# Patient Record
Sex: MALE | Race: BLACK OR AFRICAN AMERICAN | Employment: OTHER | URBAN - METROPOLITAN AREA
[De-identification: names, ages, dates, MRNs, and addresses within clinical notes are randomized per-mention and may not be internally consistent; named-entity substitution may affect disease eponyms.]

---

## 2017-01-05 ENCOUNTER — TELEPHONE (OUTPATIENT)
Dept: INTERNAL MEDICINE CLINIC | Facility: CLINIC | Age: 52
End: 2017-01-05

## 2017-01-05 NOTE — TELEPHONE ENCOUNTER
Deborah/[place] billing office has a Physician Diabetic order  for this patient  States the ICD10 Code on the order is not valid and also line 6 is missing     Please refax with  [phone]

## 2017-01-06 NOTE — TELEPHONE ENCOUNTER
Jennie from [employer] states that the ICD10 code is still incorrect. It should be E11.9 instead of the E11.1   Also on line 7 she needs a secondary diagnosis code.  She is looking for something that is not related to diabetes/blood sugars for example hypert

## 2017-01-12 ENCOUNTER — OFFICE VISIT (OUTPATIENT)
Dept: OPTOMETRY | Facility: CLINIC | Age: 52
End: 2017-01-12

## 2017-01-12 DIAGNOSIS — H52.4 PRESBYOPIA: ICD-10-CM

## 2017-01-12 DIAGNOSIS — E11.9 TYPE 2 DIABETES MELLITUS WITHOUT COMPLICATION, WITHOUT LONG-TERM CURRENT USE OF INSULIN (HCC): Primary | ICD-10-CM

## 2017-01-12 DIAGNOSIS — H25.13 AGE-RELATED NUCLEAR CATARACT OF BOTH EYES: ICD-10-CM

## 2017-01-12 PROCEDURE — 92004 COMPRE OPH EXAM NEW PT 1/>: CPT | Performed by: OPTOMETRIST

## 2017-01-12 NOTE — PROGRESS NOTES
Sam Bi is a 45year old male. HPI:     HPI     Diabetic Eye Exam   Diabetes characteristics include Type 2, taking oral medications and controlled with diet. Duration of 3 months.            Comments   Patient is in for an annual eye exam. Kandy every 4 (four) hours as needed for Wheezing. Disp: 1 Inhaler Rfl: 6   Budesonide-Formoterol Fumarate (SYMBICORT) 160-4.5 MCG/ACT Inhalation Aerosol Inhale 2 puffs into the lungs 2 (two) times daily.  Disp: 1 Inhaler Rfl: 0       Allergies:  No Known Allergi Sphere     Left +1.75 -1.50 180          Manifest Refraction #2      Sphere Cylinder Axis Dist   Right +1.00 Sphere  20/20   Left +1.25 Sphere  20/20         Manifest Refraction Comments     Recommend +1.00  for far and continue to use +3.00 for near.     Erickson Noel

## 2017-01-12 NOTE — PATIENT INSTRUCTIONS
Type 2 diabetes mellitus without complication, without long-term current use of insulin ([address]  I advised patient that there is no background diabetic retinopathy in either eye and that they should continue to keep their blood sugar under control and contin

## 2017-02-01 ENCOUNTER — TELEPHONE (OUTPATIENT)
Dept: INTERNAL MEDICINE CLINIC | Facility: CLINIC | Age: 52
End: 2017-02-01

## 2017-02-01 DIAGNOSIS — M25.561 PAIN IN BOTH KNEES, UNSPECIFIED CHRONICITY: Primary | ICD-10-CM

## 2017-02-01 DIAGNOSIS — M25.562 PAIN IN BOTH KNEES, UNSPECIFIED CHRONICITY: Primary | ICD-10-CM

## 2017-02-01 NOTE — TELEPHONE ENCOUNTER
Please look into his email request to me regarding a knee injection. Really should not come to me but should be sent to [facility] or someone else to be pended back.

## 2017-02-03 NOTE — TELEPHONE ENCOUNTER
Managed care please advise regarding pt request below.                      Expand All Collapse All    From: Abel Miles  To: Twin Chapman MD  Sent: 1/27/2017  4:29 PM CST  Subject: Non-Urgent Medical Question    I would like to have another vi

## 2017-02-03 NOTE — TELEPHONE ENCOUNTER
Please generate a referral to ortho and ortho will generate a referral for knee injections.      Thank you, Zen

## 2017-02-22 ENCOUNTER — ANESTHESIA EVENT (OUTPATIENT)
Dept: ENDOSCOPY | Facility: HOSPITAL | Age: 52
End: 2017-02-22
Payer: MEDICARE

## 2017-02-22 ENCOUNTER — ANESTHESIA (OUTPATIENT)
Dept: ENDOSCOPY | Facility: HOSPITAL | Age: 52
End: 2017-02-22
Payer: MEDICARE

## 2017-02-22 ENCOUNTER — SURGERY (OUTPATIENT)
Age: 52
End: 2017-02-22

## 2017-02-22 ENCOUNTER — HOSPITAL ENCOUNTER (OUTPATIENT)
Facility: HOSPITAL | Age: 52
Setting detail: OBSERVATION
Discharge: HOME | End: 2017-02-22
Attending: EMERGENCY MEDICINE | Admitting: HOSPITALIST
Payer: MEDICARE

## 2017-02-22 ENCOUNTER — APPOINTMENT (OUTPATIENT)
Dept: CT IMAGING | Facility: HOSPITAL | Age: 52
End: 2017-02-22
Attending: EMERGENCY MEDICINE
Payer: MEDICARE

## 2017-02-22 DIAGNOSIS — N23 URETERAL COLIC: Primary | ICD-10-CM

## 2017-02-22 DIAGNOSIS — K44.9 HIATAL HERNIA: ICD-10-CM

## 2017-02-22 DIAGNOSIS — K29.70 GASTRITIS: ICD-10-CM

## 2017-02-22 DIAGNOSIS — K20.90 ESOPHAGITIS: ICD-10-CM

## 2017-02-22 DIAGNOSIS — K31.89 MASS OF STOMACH: ICD-10-CM

## 2017-02-22 PROBLEM — R73.9 HYPERGLYCEMIA: Status: ACTIVE | Noted: 2017-02-22

## 2017-02-22 LAB
ANION GAP SERPL CALC-SCNC: 12 MMOL/L (ref 0–18)
BACTERIA UR QL AUTO: NEGATIVE /HPF
BASOPHILS # BLD: 0 K/UL (ref 0–0.2)
BASOPHILS NFR BLD: 1 %
BILIRUB UR QL: NEGATIVE
BUN SERPL-MCNC: 20 MG/DL (ref 8–20)
BUN/CREAT SERPL: 14.3 (ref 10–20)
CALCIUM SERPL-MCNC: 9.3 MG/DL (ref 8.5–10.5)
CHLORIDE SERPL-SCNC: 105 MMOL/L (ref 95–110)
CO2 SERPL-SCNC: 22 MMOL/L (ref 22–32)
COLOR UR: YELLOW
CREAT SERPL-MCNC: 1.4 MG/DL (ref 0.5–1.5)
EOSINOPHIL # BLD: 0 K/UL (ref 0–0.7)
EOSINOPHIL NFR BLD: 0 %
ERYTHROCYTE [DISTWIDTH] IN BLOOD BY AUTOMATED COUNT: 15 % (ref 11–15)
GLUCOSE SERPL-MCNC: 172 MG/DL (ref 70–99)
GLUCOSE UR-MCNC: NEGATIVE MG/DL
HCT VFR BLD AUTO: 39.7 % (ref 41–52)
HGB BLD-MCNC: 13.3 G/DL (ref 13.5–17.5)
KETONES UR-MCNC: NEGATIVE MG/DL
LEUKOCYTE ESTERASE UR QL STRIP.AUTO: NEGATIVE
LYMPHOCYTES # BLD: 0.9 K/UL (ref 1–4)
LYMPHOCYTES NFR BLD: 10 %
MCH RBC QN AUTO: 27.9 PG (ref 27–32)
MCHC RBC AUTO-ENTMCNC: 33.5 G/DL (ref 32–37)
MCV RBC AUTO: 83.2 FL (ref 80–100)
MONOCYTES # BLD: 0.5 K/UL (ref 0–1)
MONOCYTES NFR BLD: 6 %
NEUTROPHILS # BLD AUTO: 7.8 K/UL (ref 1.8–7.7)
NEUTROPHILS NFR BLD: 84 %
NITRITE UR QL STRIP.AUTO: NEGATIVE
OSMOLALITY UR CALC.SUM OF ELEC: 295 MOSM/KG (ref 275–295)
PH UR: 5 [PH] (ref 5–8)
PLATELET # BLD AUTO: 186 K/UL (ref 140–400)
PMV BLD AUTO: 9.5 FL (ref 7.4–10.3)
POTASSIUM SERPL-SCNC: 3.9 MMOL/L (ref 3.3–5.1)
PROT UR-MCNC: 30 MG/DL
RBC # BLD AUTO: 4.77 M/UL (ref 4.5–5.9)
RBC #/AREA URNS AUTO: 141 /HPF
SODIUM SERPL-SCNC: 139 MMOL/L (ref 136–144)
SP GR UR STRIP: 1.02 (ref 1–1.03)
UROBILINOGEN UR STRIP-ACNC: <2
VIT C UR-MCNC: NEGATIVE MG/DL
WBC # BLD AUTO: 9.4 K/UL (ref 4–11)
WBC #/AREA URNS AUTO: 2 /HPF

## 2017-02-22 PROCEDURE — 0DB68ZX EXCISION OF STOMACH, VIA NATURAL OR ARTIFICIAL OPENING ENDOSCOPIC, DIAGNOSTIC: ICD-10-PCS | Performed by: INTERNAL MEDICINE

## 2017-02-22 PROCEDURE — 99236 HOSP IP/OBS SAME DATE HI 85: CPT | Performed by: HOSPITALIST

## 2017-02-22 PROCEDURE — 0DB58ZX EXCISION OF ESOPHAGUS, VIA NATURAL OR ARTIFICIAL OPENING ENDOSCOPIC, DIAGNOSTIC: ICD-10-PCS | Performed by: INTERNAL MEDICINE

## 2017-02-22 PROCEDURE — 0D948ZX DRAINAGE OF ESOPHAGOGASTRIC JUNCTION, VIA NATURAL OR ARTIFICIAL OPENING ENDOSCOPIC, DIAGNOSTIC: ICD-10-PCS | Performed by: INTERNAL MEDICINE

## 2017-02-22 PROCEDURE — 99223 1ST HOSP IP/OBS HIGH 75: CPT | Performed by: INTERNAL MEDICINE

## 2017-02-22 PROCEDURE — BD47ZZZ ULTRASONOGRAPHY OF GASTROINTESTINAL TRACT: ICD-10-PCS | Performed by: INTERNAL MEDICINE

## 2017-02-22 PROCEDURE — 74176 CT ABD & PELVIS W/O CONTRAST: CPT

## 2017-02-22 RX ORDER — MORPHINE SULFATE 4 MG/ML
2 INJECTION, SOLUTION INTRAMUSCULAR; INTRAVENOUS EVERY 2 HOUR PRN
Status: DISCONTINUED | OUTPATIENT
Start: 2017-02-22 | End: 2017-02-22

## 2017-02-22 RX ORDER — HYDROMORPHONE HYDROCHLORIDE 1 MG/ML
0.5 INJECTION, SOLUTION INTRAMUSCULAR; INTRAVENOUS; SUBCUTANEOUS ONCE
Status: COMPLETED | OUTPATIENT
Start: 2017-02-22 | End: 2017-02-22

## 2017-02-22 RX ORDER — TAMSULOSIN HYDROCHLORIDE 0.4 MG/1
0.4 CAPSULE ORAL DAILY
Qty: 30 CAPSULE | Refills: 0 | Status: SHIPPED | OUTPATIENT
Start: 2017-02-22 | End: 2017-03-24

## 2017-02-22 RX ORDER — HEPARIN SODIUM 5000 [USP'U]/ML
5000 INJECTION, SOLUTION INTRAVENOUS; SUBCUTANEOUS EVERY 8 HOURS
Status: DISCONTINUED | OUTPATIENT
Start: 2017-02-22 | End: 2017-02-22

## 2017-02-22 RX ORDER — HYDROCODONE BITARTRATE AND ACETAMINOPHEN 10; 325 MG/1; MG/1
1 TABLET ORAL EVERY 6 HOURS PRN
Qty: 30 TABLET | Refills: 0 | Status: ON HOLD | OUTPATIENT
Start: 2017-02-22 | End: 2017-04-04

## 2017-02-22 RX ORDER — NALOXONE HYDROCHLORIDE 0.4 MG/ML
80 INJECTION, SOLUTION INTRAMUSCULAR; INTRAVENOUS; SUBCUTANEOUS AS NEEDED
Status: CANCELLED | OUTPATIENT
Start: 2017-02-22 | End: 2017-02-22

## 2017-02-22 RX ORDER — KETOROLAC TROMETHAMINE 30 MG/ML
30 INJECTION, SOLUTION INTRAMUSCULAR; INTRAVENOUS ONCE
Status: COMPLETED | OUTPATIENT
Start: 2017-02-22 | End: 2017-02-22

## 2017-02-22 RX ORDER — MIDAZOLAM HYDROCHLORIDE 1 MG/ML
INJECTION INTRAMUSCULAR; INTRAVENOUS AS NEEDED
Status: DISCONTINUED | OUTPATIENT
Start: 2017-02-22 | End: 2017-02-22 | Stop reason: SURG

## 2017-02-22 RX ORDER — ONDANSETRON 2 MG/ML
4 INJECTION INTRAMUSCULAR; INTRAVENOUS ONCE
Status: COMPLETED | OUTPATIENT
Start: 2017-02-22 | End: 2017-02-22

## 2017-02-22 RX ORDER — SODIUM CHLORIDE, SODIUM LACTATE, POTASSIUM CHLORIDE, CALCIUM CHLORIDE 600; 310; 30; 20 MG/100ML; MG/100ML; MG/100ML; MG/100ML
INJECTION, SOLUTION INTRAVENOUS CONTINUOUS PRN
Status: DISCONTINUED | OUTPATIENT
Start: 2017-02-22 | End: 2017-02-22 | Stop reason: SURG

## 2017-02-22 RX ORDER — ACETAMINOPHEN 325 MG/1
650 TABLET ORAL EVERY 4 HOURS PRN
Status: CANCELLED | OUTPATIENT
Start: 2017-02-22

## 2017-02-22 RX ORDER — HYDROCODONE BITARTRATE AND ACETAMINOPHEN 5; 325 MG/1; MG/1
1 TABLET ORAL EVERY 4 HOURS PRN
Status: CANCELLED | OUTPATIENT
Start: 2017-02-22

## 2017-02-22 RX ORDER — HYDROCODONE BITARTRATE AND ACETAMINOPHEN 5; 325 MG/1; MG/1
2 TABLET ORAL EVERY 4 HOURS PRN
Status: CANCELLED | OUTPATIENT
Start: 2017-02-22

## 2017-02-22 RX ORDER — DOCUSATE SODIUM 100 MG/1
100 CAPSULE, LIQUID FILLED ORAL 2 TIMES DAILY PRN
Qty: 40 CAPSULE | Refills: 0 | Status: SHIPPED | OUTPATIENT
Start: 2017-02-22 | End: 2018-12-11

## 2017-02-22 RX ORDER — ZOLPIDEM TARTRATE 5 MG/1
5 TABLET ORAL NIGHTLY PRN
Status: DISCONTINUED | OUTPATIENT
Start: 2017-02-22 | End: 2017-02-22

## 2017-02-22 RX ORDER — SODIUM CHLORIDE 9 MG/ML
INJECTION, SOLUTION INTRAVENOUS CONTINUOUS
Status: DISPENSED | OUTPATIENT
Start: 2017-02-22 | End: 2017-02-22

## 2017-02-22 RX ORDER — SODIUM CHLORIDE, SODIUM LACTATE, POTASSIUM CHLORIDE, CALCIUM CHLORIDE 600; 310; 30; 20 MG/100ML; MG/100ML; MG/100ML; MG/100ML
INJECTION, SOLUTION INTRAVENOUS CONTINUOUS
Status: CANCELLED | OUTPATIENT
Start: 2017-02-22

## 2017-02-22 RX ORDER — SIMETHICONE 125 MG
125 TABLET,CHEWABLE ORAL EVERY 6 HOURS PRN
Qty: 80 TABLET | Refills: 0 | Status: SHIPPED | OUTPATIENT
Start: 2017-02-22 | End: 2021-12-13

## 2017-02-22 RX ORDER — LIDOCAINE HYDROCHLORIDE 10 MG/ML
INJECTION, SOLUTION EPIDURAL; INFILTRATION; INTRACAUDAL; PERINEURAL AS NEEDED
Status: DISCONTINUED | OUTPATIENT
Start: 2017-02-22 | End: 2017-02-22 | Stop reason: SURG

## 2017-02-22 RX ORDER — LISINOPRIL AND HYDROCHLOROTHIAZIDE 25; 20 MG/1; MG/1
1 TABLET ORAL NIGHTLY
Status: DISCONTINUED | OUTPATIENT
Start: 2017-02-22 | End: 2017-02-22

## 2017-02-22 RX ORDER — MORPHINE SULFATE 4 MG/ML
1 INJECTION, SOLUTION INTRAMUSCULAR; INTRAVENOUS EVERY 2 HOUR PRN
Status: DISCONTINUED | OUTPATIENT
Start: 2017-02-22 | End: 2017-02-22

## 2017-02-22 RX ORDER — AMLODIPINE BESYLATE 5 MG/1
5 TABLET ORAL NIGHTLY
Status: DISCONTINUED | OUTPATIENT
Start: 2017-02-22 | End: 2017-02-22

## 2017-02-22 RX ORDER — NAPROXEN SODIUM 220 MG
2 TABLET ORAL
Status: ON HOLD | COMMUNITY
End: 2017-02-22

## 2017-02-22 RX ORDER — ONDANSETRON 2 MG/ML
4 INJECTION INTRAMUSCULAR; INTRAVENOUS EVERY 6 HOURS PRN
Status: DISCONTINUED | OUTPATIENT
Start: 2017-02-22 | End: 2017-02-22

## 2017-02-22 RX ORDER — ACETAMINOPHEN 325 MG/1
650 TABLET ORAL EVERY 6 HOURS PRN
Status: DISCONTINUED | OUTPATIENT
Start: 2017-02-22 | End: 2017-02-22

## 2017-02-22 RX ORDER — PANTOPRAZOLE SODIUM 40 MG/1
40 TABLET, DELAYED RELEASE ORAL
Qty: 30 TABLET | Refills: 0 | Status: SHIPPED | OUTPATIENT
Start: 2017-02-22 | End: 2017-03-28

## 2017-02-22 RX ORDER — MORPHINE SULFATE 4 MG/ML
4 INJECTION, SOLUTION INTRAMUSCULAR; INTRAVENOUS EVERY 2 HOUR PRN
Status: DISCONTINUED | OUTPATIENT
Start: 2017-02-22 | End: 2017-02-22

## 2017-02-22 RX ADMIN — SODIUM CHLORIDE, SODIUM LACTATE, POTASSIUM CHLORIDE, CALCIUM CHLORIDE: 600; 310; 30; 20 INJECTION, SOLUTION INTRAVENOUS at 12:25:00

## 2017-02-22 RX ADMIN — MIDAZOLAM HYDROCHLORIDE 2 MG: 1 INJECTION INTRAMUSCULAR; INTRAVENOUS at 11:57:00

## 2017-02-22 RX ADMIN — LIDOCAINE HYDROCHLORIDE 50 MG: 10 INJECTION, SOLUTION EPIDURAL; INFILTRATION; INTRACAUDAL; PERINEURAL at 11:58:00

## 2017-02-22 RX ADMIN — SODIUM CHLORIDE, SODIUM LACTATE, POTASSIUM CHLORIDE, CALCIUM CHLORIDE: 600; 310; 30; 20 INJECTION, SOLUTION INTRAVENOUS at 11:55:00

## 2017-02-22 NOTE — H&P
[facility]    PATIENT'S NAME: Twin Carter   ATTENDING PHYSICIAN: Edith Saleh MD   PATIENT ACCOUNT#:   [id number]    LOCATION:  [facility] ROOM 3 [place] [address]  MEDICAL RECORD #:   [id number]       YOB: 1965  ADMISSI kidney stone, diabetes, and hypertension. MEDICATIONS:    1. Metformin 500 mg daily with breakfast and 1000 mg at night  2. Amlodipine 5 mg daily  3. Lisinopril/hydrochlorothiazide 20/25 one daily  4. Mucinex 0.05% 1 daily.   5. Glucosamine liquid 30 mL coordination, able to lift both legs off the bed. Reflexes 1+ at the elbows and knees. Toes are downgoing.   SKIN:  No unusual rashes or tattoos seen on my exam.    LABORATORY DATA:  Sodium 139, potassium 3.9, chloride 105, CO2 of 22, BUN 22, creatinine 1 Regi Freeman MD  d: 02/22/2017 13:57:30  t: 02/22/2017 14:25:20  [facility] [id number]  [facility]/

## 2017-02-22 NOTE — ANESTHESIA PREPROCEDURE EVALUATION
Anesthesia PreOp Note    HPI:     Chriss Valdez is a 45year old male who presents for preoperative consultation requested by: Kin Harper MD    Date of Surgery: 2/22/2017    Procedure(s):  ESOPHAGOGASTRODUODENOSCOPY (EGD)  Indication: none given Disp:  Rfl:  Past Week at Unknown time   Naproxen Sodium (ALEVE) 220 MG Oral Tab Take 2 tablets by mouth daily as needed.  Disp:  Rfl:     Oxymetazoline HCl (MUCINEX NASAL SPRAY FULL FORCE) 0.05 % Nasal Solution 1 spray by Nasal route nightly as needed for 02/22/2017   RBC 4.77 02/22/2017   HGB 13.3* 02/22/2017   HCT 39.7* 02/22/2017   MCV 83.2 02/22/2017   MCH 27.9 02/22/2017   MCHC 33.5 02/22/2017   RDW 15.0 02/22/2017    02/22/2017   MPV 9.5 02/22/2017       Lab Results  Component Value Date   NA 1 CLAUDIA  2/22/2017 11:10 AM

## 2017-02-22 NOTE — CONSULTS
[facility]   Gastroenterology Consultation Note    Nona Ibarra  Patient Status:    Observation  Date of Admission:         2/22/2017, Hospital day #0  Attending:   Leslie Barakat MD  PCP:     Isabella Santos MD    Reason for facility-administered medications:   •  0.9%  NaCl infusion, , Intravenous, Continuous    Review of Systems:  CONSTITUTIONAL:  negative for fevers, rigors  EYES:  negative for diplopia   RESPIRATORY:  negative for severe shortness of breath  CARDIOVASCULAR hydroureteronephrosis, perinephric and proximal periureteric stranding. 2. A 7 cm left upper quadrant exophytic soft tissue mass arising from greater curvature of gastric fundus.  Differential considerations include exophytic leiomyoma, gastrointestinal str circumstances. All questions were answered to the patient’s satisfaction.  The patient has agreed to sign an informed consent and elected to proceed with upper endoscopy/enteroscopy with possible intervention [i.e. polypectomy, ablation, stent placement, et

## 2017-02-22 NOTE — OPERATIVE REPORT
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 Biopsies were taken from that area. The squamocolumnar junction appeared to be slightly irregular with an area of approximately 0.5 mm of flat columnar epithelium suggestive of Preciado esophagus. Biopsies were taken from that area.   He had an especially if it turns out to be a GIST. These results were discussed with Dr. Chao Da Silva.     Dictated By Pratik Elias MD  d: 02/22/2017 12:40:01  t: 02/22/2017 12:54:10  [facility] [id number]  RA/

## 2017-02-22 NOTE — BRIEF OP NOTE
[facility] ENDOSCOPY LAB SUITES  Brief Op Note     Azael Cisneros Location: OR   CSN [id number] MRN [id number]   Admission Date 2/22/2017 Operation Date 2/22/2017   Attending Physician Genesis Sen,* Operating Physician Jade Roa MD

## 2017-02-22 NOTE — ED PROVIDER NOTES
Patient Seen in: [facility] AND [facility] Emergency Department    History   Patient presents with:  Abdomen/Flank Pain (GI/)    Stated Complaint: flank pain    HPI    Patient presents to the emergency department complaining of sharp stabbing severe right flan MG Oral Tab,  Take 1 tablet by mouth daily. Patient taking differently: Take 1 tablet by mouth nightly. AmLODIPine Besylate 5 MG Oral Tab,  Take 1 tablet (5 mg total) by mouth daily. Patient taking differently: Take 5 mg by mouth nightly.      MetFORM distress. Abdominal: Soft. He exhibits no distension. There is no tenderness. Musculoskeletal: Normal range of motion. He exhibits no tenderness. Neurological: He is alert and oriented to person, place, and time. Skin: Skin is warm and dry.  No rash encounter diagnosis)  Mass of stomach  Hiatal hernia  Gastritis  Esophagitis    Disposition:  Admit    Follow-up:  Michel Washington, [facility]  Wes Houston MD  [address] [place] [address]

## 2017-02-22 NOTE — ANESTHESIA POSTPROCEDURE EVALUATION
Patient: Cole Beckford    Procedure Summary     Date Anesthesia Start Anesthesia Stop Room / Location    02/22/17 1155 1231 [facility] ENDOSCOPY 05 / 300 [facility] ENDOSCOPY       Procedure Diagnosis Surgeon Responsible Provider    ESOPHAGOGASTRODUODENOSCOPY (EGD) (N/A )

## 2017-02-23 ENCOUNTER — TELEPHONE (OUTPATIENT)
Dept: GASTROENTEROLOGY | Facility: CLINIC | Age: 52
End: 2017-02-23

## 2017-02-23 ENCOUNTER — TELEPHONE (OUTPATIENT)
Dept: INTERNAL MEDICINE CLINIC | Facility: CLINIC | Age: 52
End: 2017-02-23

## 2017-02-23 VITALS
TEMPERATURE: 98 F | DIASTOLIC BLOOD PRESSURE: 78 MMHG | BODY MASS INDEX: 44.1 KG/M2 | RESPIRATION RATE: 18 BRPM | HEIGHT: 71 IN | SYSTOLIC BLOOD PRESSURE: 108 MMHG | OXYGEN SATURATION: 95 % | HEART RATE: 96 BPM | WEIGHT: 315 LBS

## 2017-02-23 DIAGNOSIS — C16.9 MALIGNANT NEOPLASM OF STOMACH, UNSPECIFIED LOCATION (HCC): Primary | ICD-10-CM

## 2017-02-23 NOTE — TELEPHONE ENCOUNTER
Discussed the diagnosis of GIST from path results with [facility] today. He understands it is a type of cancer, and although it has favorable features, he will need to likely have surgery and therapy. I discussed the case with Dr. Rigo Sawant and Dr. Seth Lemos Meth

## 2017-02-23 NOTE — DISCHARGE SUMMARY
[facility]    PATIENT'S NAME: Smiley Henson   ATTENDING PHYSICIAN: Edith Saleh MD   PATIENT ACCOUNT#:   [id number]    LOCATION:  [facility] ROOM 3 [place] [address]  MEDICAL RECORD #:   [id number]       YOB: 1965  ADMISSI

## 2017-02-23 NOTE — TELEPHONE ENCOUNTER
Will have Ciaran Fitzgerald call patient to assist in setting up appts for oncologic f/u.     CC: Alden Silveira

## 2017-02-23 NOTE — PROGRESS NOTES
Quick Note:    Per RA discussed case with Dr Janine Sanz who will contact patient to discuss results and referral to surgeon.   ______

## 2017-02-24 ENCOUNTER — TELEPHONE (OUTPATIENT)
Dept: INTERNAL MEDICINE CLINIC | Facility: CLINIC | Age: 52
End: 2017-02-24

## 2017-02-24 ENCOUNTER — TELEPHONE (OUTPATIENT)
Dept: GASTROENTEROLOGY | Facility: CLINIC | Age: 52
End: 2017-02-24

## 2017-02-24 ENCOUNTER — NURSE NAVIGATOR ENCOUNTER (OUTPATIENT)
Dept: HEMATOLOGY/ONCOLOGY | Facility: HOSPITAL | Age: 52
End: 2017-02-24

## 2017-02-24 DIAGNOSIS — C49.A2 GASTROINTESTINAL STROMAL TUMOR (GIST) OF STOMACH (HCC): Primary | ICD-10-CM

## 2017-02-24 RX ORDER — AMOXICILLIN 500 MG/1
1000 TABLET, FILM COATED ORAL 2 TIMES DAILY
Qty: 56 TABLET | Refills: 0 | Status: SHIPPED | OUTPATIENT
Start: 2017-02-24 | End: 2017-03-10

## 2017-02-24 RX ORDER — CLARITHROMYCIN 500 MG/1
500 TABLET, COATED ORAL 2 TIMES DAILY
Qty: 28 TABLET | Refills: 0 | Status: SHIPPED | OUTPATIENT
Start: 2017-02-24 | End: 2017-03-10

## 2017-02-24 RX ORDER — FLUCONAZOLE 200 MG/1
200 TABLET ORAL DAILY
Qty: 14 TABLET | Refills: 0 | Status: SHIPPED | OUTPATIENT
Start: 2017-02-24 | End: 2017-03-10

## 2017-02-24 NOTE — TELEPHONE ENCOUNTER
Spoke with Scarlett at the [facility] patient is being seen on 2/28/17. Please approve order.

## 2017-02-24 NOTE — PROGRESS NOTES
Quick Note:    Discussed with Dr Jennie Benitez, patient to follow up with him for treatment  ______

## 2017-02-24 NOTE — PROGRESS NOTES
Call placed to [facility] to introduce myself as Nurse Navigator and explained my role in his care. Role of NN explained as providing:  Guidance to healthcare services to ensure continuity and coordination of care.   Education about your diagnosis, treatm

## 2017-02-24 NOTE — TELEPHONE ENCOUNTER
I discussed the other pathology results from EGD/EUS showing H.pylori gastritis and candida esophagitis (with sloughing of esophagus). He is asymptomatic from both infections.      The natural history of H.pylori was reviewed with the patient, as well as po

## 2017-02-27 ENCOUNTER — HOSPITAL ENCOUNTER (OUTPATIENT)
Dept: GENERAL RADIOLOGY | Facility: HOSPITAL | Age: 52
Discharge: HOME | End: 2017-02-27
Attending: ORTHOPAEDIC SURGERY | Admitting: ORTHOPAEDIC SURGERY
Payer: MEDICARE

## 2017-02-27 ENCOUNTER — OFFICE VISIT (OUTPATIENT)
Dept: ORTHOPEDICS CLINIC | Facility: CLINIC | Age: 52
End: 2017-02-27

## 2017-02-27 DIAGNOSIS — M17.0 PRIMARY OSTEOARTHRITIS OF BOTH KNEES: Primary | ICD-10-CM

## 2017-02-27 DIAGNOSIS — M25.562 PAIN IN BOTH KNEES, UNSPECIFIED CHRONICITY: ICD-10-CM

## 2017-02-27 DIAGNOSIS — M25.561 PAIN IN BOTH KNEES, UNSPECIFIED CHRONICITY: ICD-10-CM

## 2017-02-27 PROCEDURE — 99203 OFFICE O/P NEW LOW 30 MIN: CPT | Performed by: ORTHOPAEDIC SURGERY

## 2017-02-27 PROCEDURE — G0463 HOSPITAL OUTPT CLINIC VISIT: HCPCS | Performed by: ORTHOPAEDIC SURGERY

## 2017-02-27 PROCEDURE — 73560 X-RAY EXAM OF KNEE 1 OR 2: CPT

## 2017-02-27 PROCEDURE — 73565 X-RAY EXAM OF KNEES: CPT

## 2017-02-27 NOTE — PROGRESS NOTES
Dr. Prudencio Sol was seen in my office as you requested today for orthopedic consultation regarding knee pain.   This is a 44-year-old gentleman who is had long-standing history of knee arthritis treated with cortisone injections as well as Juan Pablo Chapa to ambulate    Bilateral knee exam show obesity about the legs but he can flex at least 110-115 extensions full with some crepitance but ligaments on both knees are stable and no knee has any effusion warmth or erythema.   He is no calf tenderness sensorimo

## 2017-02-28 ENCOUNTER — OFFICE VISIT (OUTPATIENT)
Dept: SURGERY | Facility: CLINIC | Age: 52
End: 2017-02-28

## 2017-02-28 ENCOUNTER — OFFICE VISIT (OUTPATIENT)
Dept: HEMATOLOGY/ONCOLOGY | Facility: HOSPITAL | Age: 52
End: 2017-02-28
Attending: INTERNAL MEDICINE
Payer: MEDICARE

## 2017-02-28 VITALS
TEMPERATURE: 98 F | HEIGHT: 71 IN | SYSTOLIC BLOOD PRESSURE: 117 MMHG | WEIGHT: 315 LBS | DIASTOLIC BLOOD PRESSURE: 66 MMHG | BODY MASS INDEX: 44.1 KG/M2 | RESPIRATION RATE: 16 BRPM | HEART RATE: 90 BPM

## 2017-02-28 DIAGNOSIS — C49.A2 MALIGNANT GASTROINTESTINAL STROMAL TUMOR (GIST) OF STOMACH (HCC): Primary | ICD-10-CM

## 2017-02-28 DIAGNOSIS — C49.A2 GASTROINTESTINAL STROMAL TUMOR (GIST) OF STOMACH (HCC): Primary | ICD-10-CM

## 2017-02-28 DIAGNOSIS — E66.9 OBESITY, UNSPECIFIED OBESITY SEVERITY, UNSPECIFIED OBESITY TYPE: ICD-10-CM

## 2017-02-28 DIAGNOSIS — C49.A0 MALIGNANT GASTROINTESTINAL STROMAL TUMOR, UNSPECIFIED SITE (HCC): Primary | ICD-10-CM

## 2017-02-28 PROCEDURE — G0463 HOSPITAL OUTPT CLINIC VISIT: HCPCS | Performed by: INTERNAL MEDICINE

## 2017-02-28 PROCEDURE — 99205 OFFICE O/P NEW HI 60 MIN: CPT | Performed by: SURGERY

## 2017-02-28 PROCEDURE — 99205 OFFICE O/P NEW HI 60 MIN: CPT | Performed by: INTERNAL MEDICINE

## 2017-02-28 NOTE — CONSULTS
[address] [facility] Surgical Oncology    Patient Name:  Sisi Mancia   YOB: 1965   Gender: male   Appt Date:  02/28/2016   Provider:  Ana Harvey MD   [facility]     Chief Complaint:  Evaluation for Gastric mass    H • Diabetes [facility])          Past Surgical History    EGD N/A 2/22/2017    Comment Procedure: ESOPHAGOGASTRODUODENOSCOPY (EGD);   Surgeon: Nicola Mendiola MD;  Location: [address] [place] [address] ENDOSCOPY     Family History   Problem Relation Age of Onset   • Alcohol and Other [place] •  Pantoprazole Sodium (PROTONIX) 40 MG Oral Tab EC, Take 1 tablet (40 mg total) by mouth every morning before breakfast., Disp: 30 tablet, Rfl: 0  •  Lisinopril-Hydrochlorothiazide 20-25 MG Oral Tab, Take 1 tablet by mouth daily.  (Patient taking different Lab Results  Component Value Date   WBC 9.4 02/22/2017   RBC 4.77 02/22/2017   HGB 13.3* 02/22/2017   HCT 39.7* 02/22/2017   MCV 83.2 02/22/2017   MCH 27.9 02/22/2017   MCHC 33.5 02/22/2017   RDW 15.0 02/22/2017    02/22/2017   MPV 9.5 02/22/2017 T: [phone]  F: [phone]

## 2017-02-28 NOTE — PROGRESS NOTES
New consult: Jessa-gastric GIST tumor. Pt reports knee pain, 6/10. Pt given pre-op instuctions for surgery. Pt to see PCP for medical clearance.      Education Record    Learner:  Patient    Disease / Diagnosis: GIST tumor     Barriers / Limitations:  None

## 2017-03-01 ENCOUNTER — NURSE NAVIGATOR ENCOUNTER (OUTPATIENT)
Dept: HEMATOLOGY/ONCOLOGY | Facility: HOSPITAL | Age: 52
End: 2017-03-01

## 2017-03-01 NOTE — PROGRESS NOTES
Introduced myself as Nurse Navigator to Jacqueline Swartz and explained that I have scheduled him for a CT scan of his chest, abdomen and pelvis, as directed by his physician.   Jacqueline Swartz informed me that he can only make appointments on Saturday or Sunday or late in the lisa

## 2017-03-01 NOTE — CONSULTS
[facility]    PATIENT'S NAME: Avinash Reyna PHYSICIAN: Jenny Lawson.  Sharif Nur MD   PATIENT ACCOUNT #: [de-identified] LOCATION: [address] [place] [address] RECORD #: [id number] YOB: 1965   CONSULTATION DATE: 02/28/2017       CANCER ADRIANNA osteoarthritis, nephrolithiasis, H. pylori gastritis and candida esophagitis. MEDICATION:  Amlodipine, metformin, lisinopril/hydrochlorothiazide, Mucinex, glucosamine, Protonix, fluconazole. ALLERGIES:  No known drug allergies.     SOCIAL HISTORY: determination. Of note, we would recommend a dedicated contrasted CT of the chest, abdomen, and pelvis prior to surgery. This order was placed.     Thank you very much for the consultation request.  We will continue to follow with the patient in our cli

## 2017-03-04 ENCOUNTER — HOSPITAL ENCOUNTER (OUTPATIENT)
Dept: CT IMAGING | Age: 52
Discharge: HOME | End: 2017-03-04
Attending: INTERNAL MEDICINE
Payer: MEDICARE

## 2017-03-04 DIAGNOSIS — C49.A2 GASTROINTESTINAL STROMAL TUMOR (GIST) OF STOMACH (HCC): ICD-10-CM

## 2017-03-04 PROCEDURE — 74177 CT ABD & PELVIS W/CONTRAST: CPT

## 2017-03-04 PROCEDURE — 71260 CT THORAX DX C+: CPT

## 2017-03-07 ENCOUNTER — TELEPHONE (OUTPATIENT)
Dept: INTERNAL MEDICINE CLINIC | Facility: CLINIC | Age: 52
End: 2017-03-07

## 2017-03-07 NOTE — TELEPHONE ENCOUNTER
Pt is eligible for a Medicare Annual Health Assessment. Discussed in detail w/patient. Appt made for 6/13/2017.

## 2017-03-20 ENCOUNTER — PATIENT MESSAGE (OUTPATIENT)
Dept: ORTHOPEDICS CLINIC | Facility: CLINIC | Age: 52
End: 2017-03-20

## 2017-03-20 ENCOUNTER — OFFICE VISIT (OUTPATIENT)
Dept: ORTHOPEDICS CLINIC | Facility: CLINIC | Age: 52
End: 2017-03-20

## 2017-03-20 VITALS — RESPIRATION RATE: 16 BRPM | SYSTOLIC BLOOD PRESSURE: 120 MMHG | DIASTOLIC BLOOD PRESSURE: 72 MMHG | HEART RATE: 64 BPM

## 2017-03-20 DIAGNOSIS — M17.10 PRIMARY OSTEOARTHRITIS OF KNEE, UNSPECIFIED LATERALITY: ICD-10-CM

## 2017-03-20 DIAGNOSIS — M17.0 PRIMARY OSTEOARTHRITIS OF BOTH KNEES: Primary | ICD-10-CM

## 2017-03-20 PROCEDURE — 20610 DRAIN/INJ JOINT/BURSA W/O US: CPT | Performed by: ORTHOPAEDIC SURGERY

## 2017-03-20 RX ORDER — AMOXICILLIN 500 MG/1
CAPSULE ORAL
Refills: 0 | COMMUNITY
Start: 2017-02-24 | End: 2017-03-22

## 2017-03-20 NOTE — TELEPHONE ENCOUNTER
From: Rasta Ortiz  To: Giancarlo De Oliveira MD  Sent: 3/20/2017 2:29 PM CDT  Subject: Non-Urgent Medical Question    How many cc of synvisc was I given?

## 2017-03-20 NOTE — PROCEDURES
With consent given the left knee was prepped and draped and injected with Synvisc 1 per protocol. He tolerated quite well. Precautions were discussed if there is any problems with the injection.   Also the fact this could take anywhere from 4-6 weeks to h

## 2017-03-20 NOTE — TELEPHONE ENCOUNTER
From: Marquis Sosa  To: Noah Buck MD  Sent: 3/20/2017 2:08 PM CDT  Subject: Non-Urgent Medical Question    The pretty lady who prepared me for the synvisc asked about the medication that was not in her information.  It is Clarithromycin 500 MG 1x/d

## 2017-03-20 NOTE — PROGRESS NOTES
Patient is here today requesting Synvisc 1 to his left knee. He had this done some time ago and it did help. It is his left knee that is most symptomatic with known primary osteoarthritis of both knees.     He has not lost any weight and he understands th

## 2017-03-22 ENCOUNTER — OFFICE VISIT (OUTPATIENT)
Dept: INTERNAL MEDICINE CLINIC | Facility: CLINIC | Age: 52
End: 2017-03-22

## 2017-03-22 ENCOUNTER — LAB ENCOUNTER (OUTPATIENT)
Dept: LAB | Facility: HOSPITAL | Age: 52
End: 2017-03-22
Attending: SURGERY
Payer: MEDICARE

## 2017-03-22 ENCOUNTER — TELEPHONE (OUTPATIENT)
Dept: ORTHOPEDICS CLINIC | Facility: CLINIC | Age: 52
End: 2017-03-22

## 2017-03-22 VITALS
SYSTOLIC BLOOD PRESSURE: 146 MMHG | BODY MASS INDEX: 44.1 KG/M2 | DIASTOLIC BLOOD PRESSURE: 84 MMHG | HEIGHT: 71 IN | HEART RATE: 88 BPM | WEIGHT: 315 LBS | RESPIRATION RATE: 16 BRPM

## 2017-03-22 DIAGNOSIS — I10 ESSENTIAL HYPERTENSION WITH GOAL BLOOD PRESSURE LESS THAN 140/90: ICD-10-CM

## 2017-03-22 DIAGNOSIS — E11.9 TYPE 2 DIABETES MELLITUS WITHOUT COMPLICATION, WITHOUT LONG-TERM CURRENT USE OF INSULIN (HCC): ICD-10-CM

## 2017-03-22 DIAGNOSIS — E78.2 MIXED HYPERLIPIDEMIA: ICD-10-CM

## 2017-03-22 DIAGNOSIS — K31.89 MASS OF STOMACH: ICD-10-CM

## 2017-03-22 DIAGNOSIS — C49.A2 MALIGNANT GASTROINTESTINAL STROMAL TUMOR (GIST) OF STOMACH (HCC): Primary | ICD-10-CM

## 2017-03-22 DIAGNOSIS — E11.9 TYPE 2 DIABETES MELLITUS WITHOUT COMPLICATION, WITHOUT LONG-TERM CURRENT USE OF INSULIN (HCC): Primary | ICD-10-CM

## 2017-03-22 DIAGNOSIS — C49.A2 MALIGNANT GASTROINTESTINAL STROMAL TUMOR (GIST) OF STOMACH (HCC): ICD-10-CM

## 2017-03-22 DIAGNOSIS — E66.01 MORBID OBESITY DUE TO EXCESS CALORIES (HCC): ICD-10-CM

## 2017-03-22 LAB
ALBUMIN SERPL BCP-MCNC: 4 G/DL (ref 3.5–4.8)
ALBUMIN/GLOB SERPL: 1.3 {RATIO} (ref 1–2)
ALP SERPL-CCNC: 72 U/L (ref 32–100)
ALT SERPL-CCNC: 48 U/L (ref 17–63)
ANION GAP SERPL CALC-SCNC: 10 MMOL/L (ref 0–18)
AST SERPL-CCNC: 34 U/L (ref 15–41)
BASOPHILS # BLD: 0.1 K/UL (ref 0–0.2)
BASOPHILS NFR BLD: 1 %
BILIRUB SERPL-MCNC: 0.4 MG/DL (ref 0.3–1.2)
BILIRUB UR QL: NEGATIVE
BUN SERPL-MCNC: 14 MG/DL (ref 8–20)
BUN/CREAT SERPL: 13.7 (ref 10–20)
CALCIUM SERPL-MCNC: 9.5 MG/DL (ref 8.5–10.5)
CHLORIDE SERPL-SCNC: 106 MMOL/L (ref 95–110)
CLARITY UR: CLEAR
CO2 SERPL-SCNC: 25 MMOL/L (ref 22–32)
COLOR UR: YELLOW
CREAT SERPL-MCNC: 1.02 MG/DL (ref 0.5–1.5)
CREAT UR-MCNC: 152.3 MG/DL
EOSINOPHIL # BLD: 0.1 K/UL (ref 0–0.7)
EOSINOPHIL NFR BLD: 2 %
ERYTHROCYTE [DISTWIDTH] IN BLOOD BY AUTOMATED COUNT: 15.3 % (ref 11–15)
GLOBULIN PLAS-MCNC: 3.1 G/DL (ref 2.5–3.7)
GLUCOSE SERPL-MCNC: 128 MG/DL (ref 70–99)
GLUCOSE UR-MCNC: NEGATIVE MG/DL
HBA1C MFR BLD: 6.9 % (ref 4–6)
HCT VFR BLD AUTO: 41 % (ref 41–52)
HGB BLD-MCNC: 13.5 G/DL (ref 13.5–17.5)
HGB UR QL STRIP.AUTO: NEGATIVE
INR BLD: 1 (ref 0.9–1.2)
KETONES UR-MCNC: NEGATIVE MG/DL
LEUKOCYTE ESTERASE UR QL STRIP.AUTO: NEGATIVE
LYMPHOCYTES # BLD: 1.2 K/UL (ref 1–4)
LYMPHOCYTES NFR BLD: 21 %
MCH RBC QN AUTO: 27.1 PG (ref 27–32)
MCHC RBC AUTO-ENTMCNC: 32.9 G/DL (ref 32–37)
MCV RBC AUTO: 82.5 FL (ref 80–100)
MICROALBUMIN UR-MCNC: 2.2 MG/DL (ref 0–1.8)
MICROALBUMIN/CREAT UR: 14.4 MG/G{CREAT} (ref 0–20)
MONOCYTES # BLD: 0.5 K/UL (ref 0–1)
MONOCYTES NFR BLD: 9 %
NEUTROPHILS # BLD AUTO: 3.8 K/UL (ref 1.8–7.7)
NEUTROPHILS NFR BLD: 66 %
NITRITE UR QL STRIP.AUTO: NEGATIVE
OSMOLALITY UR CALC.SUM OF ELEC: 294 MOSM/KG (ref 275–295)
PH UR: 5 [PH] (ref 5–8)
PLATELET # BLD AUTO: 197 K/UL (ref 140–400)
PMV BLD AUTO: 9.1 FL (ref 7.4–10.3)
POTASSIUM SERPL-SCNC: 3.8 MMOL/L (ref 3.3–5.1)
PROT SERPL-MCNC: 7.1 G/DL (ref 5.9–8.4)
PROT UR-MCNC: NEGATIVE MG/DL
PROTHROMBIN TIME: 12.4 SECONDS (ref 11.8–14.5)
RBC # BLD AUTO: 4.97 M/UL (ref 4.5–5.9)
SODIUM SERPL-SCNC: 141 MMOL/L (ref 136–144)
SP GR UR STRIP: 1.02 (ref 1–1.03)
UROBILINOGEN UR STRIP-ACNC: <2
VIT C UR-MCNC: NEGATIVE MG/DL
WBC # BLD AUTO: 5.8 K/UL (ref 4–11)

## 2017-03-22 PROCEDURE — 99214 OFFICE O/P EST MOD 30 MIN: CPT | Performed by: INTERNAL MEDICINE

## 2017-03-22 PROCEDURE — 80053 COMPREHEN METABOLIC PANEL: CPT

## 2017-03-22 PROCEDURE — G0463 HOSPITAL OUTPT CLINIC VISIT: HCPCS | Performed by: INTERNAL MEDICINE

## 2017-03-22 PROCEDURE — 85025 COMPLETE CBC W/AUTO DIFF WBC: CPT

## 2017-03-22 PROCEDURE — 93005 ELECTROCARDIOGRAM TRACING: CPT

## 2017-03-22 PROCEDURE — 82043 UR ALBUMIN QUANTITATIVE: CPT

## 2017-03-22 PROCEDURE — 81003 URINALYSIS AUTO W/O SCOPE: CPT

## 2017-03-22 PROCEDURE — 85610 PROTHROMBIN TIME: CPT

## 2017-03-22 PROCEDURE — 93010 ELECTROCARDIOGRAM REPORT: CPT | Performed by: SURGERY

## 2017-03-22 PROCEDURE — 83036 HEMOGLOBIN GLYCOSYLATED A1C: CPT

## 2017-03-22 PROCEDURE — 36415 COLL VENOUS BLD VENIPUNCTURE: CPT

## 2017-03-22 PROCEDURE — 82570 ASSAY OF URINE CREATININE: CPT

## 2017-03-22 RX ORDER — ATORVASTATIN CALCIUM 10 MG/1
10 TABLET, FILM COATED ORAL NIGHTLY
Qty: 90 TABLET | Refills: 3 | Status: SHIPPED | OUTPATIENT
Start: 2017-03-22 | End: 2017-07-17

## 2017-03-22 RX ORDER — AMOXICILLIN 500 MG/1
500 TABLET, FILM COATED ORAL 2 TIMES DAILY
Status: ON HOLD | COMMUNITY
End: 2017-04-04

## 2017-03-22 RX ORDER — CLARITHROMYCIN 250 MG/1
250 TABLET, FILM COATED ORAL DAILY
Status: ON HOLD | COMMUNITY
End: 2017-04-04

## 2017-03-22 RX ORDER — CLARITHROMYCIN 500 MG/1
500 TABLET, COATED ORAL DAILY
Status: ON HOLD | COMMUNITY
End: 2017-04-04

## 2017-03-22 NOTE — TELEPHONE ENCOUNTER
See MyChart encounter from 03/20/17 under Damian's name  Medication list for pt updated as requested by pt.

## 2017-03-22 NOTE — PROGRESS NOTES
Abel Miles is a 47year old male. HPI:   1.  Type 2 diabetes mellitus without complication, without long-term current use of insulin (HCC)    The patient has been taking all prescribed diabetic medications at home and has been following a diabetic die health.       Wt Readings from Last 6 Encounters:  03/22/17 : 384 lb (174.181 kg)  02/28/17 : 387 lb (175.542 kg)  02/22/17 : 397 lb 4 oz (180.191 kg)  12/21/16 : 381 lb (172.82 kg)  12/12/16 : 391 lb (177.356 kg)  11/17/16 : 382 lb (173.274 kg)        Curr nightly.  ) Disp: 60 tablet Rfl: 5      Past Medical History   Diagnosis Date   • Unspecified essential hypertension    • Depression    • Anxiety state, unspecified    • Obesity, unspecified    • Unspecified sleep apnea 2/1/2012   • Sarcoidosis ([facility] 2/1/2 HgbA1C,lose wgt with carbohydrate controlled diet and exercise, refer to Ophthalmology, check feet daily.    - Hemoglobin A1C [E]; Future  - Microalb/Creat Ratio, Random Urine [E]; Future    2.  Essential hypertension with goal blood pressure less than 140/

## 2017-03-27 ENCOUNTER — OFFICE VISIT (OUTPATIENT)
Dept: ORTHOPEDICS CLINIC | Facility: CLINIC | Age: 52
End: 2017-03-27

## 2017-03-27 DIAGNOSIS — M17.0 PRIMARY OSTEOARTHRITIS OF BOTH KNEES: Primary | ICD-10-CM

## 2017-03-27 PROCEDURE — 99212 OFFICE O/P EST SF 10 MIN: CPT | Performed by: ORTHOPAEDIC SURGERY

## 2017-03-27 PROCEDURE — 20610 DRAIN/INJ JOINT/BURSA W/O US: CPT | Performed by: ORTHOPAEDIC SURGERY

## 2017-03-27 NOTE — PROCEDURES
Procedure note. With consent given and a timeout performed the right knee was sterilely prepped and draped and injected with Synvisc 1 per protocol. He tolerated well.   Realistic expectations were discussed and if there is any redness or swelling we shou

## 2017-03-27 NOTE — PROGRESS NOTES
Patient presents for follow-up of the left knee Synvisc 1 injection is helped he denies significant swelling although there was a little swelling last weekend but it has resolved and overall he feels it has helped.   He is here for follow-up regarding his l

## 2017-04-04 ENCOUNTER — ANESTHESIA EVENT (OUTPATIENT)
Dept: SURGERY | Facility: HOSPITAL | Age: 52
DRG: 988 | End: 2017-04-04
Payer: MEDICARE

## 2017-04-04 ENCOUNTER — SURGERY (OUTPATIENT)
Age: 52
End: 2017-04-04

## 2017-04-04 ENCOUNTER — HOSPITAL ENCOUNTER (INPATIENT)
Facility: HOSPITAL | Age: 52
LOS: 3 days | Discharge: HOME | DRG: 988 | End: 2017-04-07
Attending: SURGERY | Admitting: SURGERY
Payer: MEDICARE

## 2017-04-04 ENCOUNTER — ANESTHESIA (OUTPATIENT)
Dept: SURGERY | Facility: HOSPITAL | Age: 52
DRG: 988 | End: 2017-04-04
Payer: MEDICARE

## 2017-04-04 DIAGNOSIS — C49.A0 MALIGNANT GASTROINTESTINAL STROMAL TUMOR, UNSPECIFIED SITE (HCC): ICD-10-CM

## 2017-04-04 DIAGNOSIS — K42.9 UMBILICAL HERNIA WITHOUT OBSTRUCTION AND WITHOUT GANGRENE: Primary | ICD-10-CM

## 2017-04-04 PROCEDURE — 99222 1ST HOSP IP/OBS MODERATE 55: CPT | Performed by: INTERNAL MEDICINE

## 2017-04-04 PROCEDURE — 0WQF0ZZ REPAIR ABDOMINAL WALL, OPEN APPROACH: ICD-10-PCS | Performed by: SURGERY

## 2017-04-04 PROCEDURE — 0DJ08ZZ INSPECTION OF UPPER INTESTINAL TRACT, VIA NATURAL OR ARTIFICIAL OPENING ENDOSCOPIC: ICD-10-PCS | Performed by: INTERNAL MEDICINE

## 2017-04-04 PROCEDURE — 43235 EGD DIAGNOSTIC BRUSH WASH: CPT | Performed by: INTERNAL MEDICINE

## 2017-04-04 PROCEDURE — 0DB64ZZ EXCISION OF STOMACH, PERCUTANEOUS ENDOSCOPIC APPROACH: ICD-10-PCS | Performed by: SURGERY

## 2017-04-04 PROCEDURE — 99233 SBSQ HOSP IP/OBS HIGH 50: CPT | Performed by: HOSPITALIST

## 2017-04-04 PROCEDURE — 8E0W4CZ ROBOTIC ASSISTED PROCEDURE OF TRUNK REGION, PERCUTANEOUS ENDOSCOPIC APPROACH: ICD-10-PCS | Performed by: SURGERY

## 2017-04-04 RX ORDER — SODIUM CHLORIDE 9 MG/ML
INJECTION, SOLUTION INTRAVENOUS CONTINUOUS PRN
Status: DISCONTINUED | OUTPATIENT
Start: 2017-04-04 | End: 2017-04-04 | Stop reason: SURG

## 2017-04-04 RX ORDER — METRONIDAZOLE 500 MG/100ML
500 INJECTION, SOLUTION INTRAVENOUS EVERY 8 HOURS
Status: COMPLETED | OUTPATIENT
Start: 2017-04-04 | End: 2017-04-06

## 2017-04-04 RX ORDER — HYDRALAZINE HYDROCHLORIDE 20 MG/ML
10 INJECTION INTRAMUSCULAR; INTRAVENOUS EVERY 4 HOURS PRN
Status: DISCONTINUED | OUTPATIENT
Start: 2017-04-04 | End: 2017-04-07

## 2017-04-04 RX ORDER — MORPHINE SULFATE 10 MG/ML
6 INJECTION, SOLUTION INTRAMUSCULAR; INTRAVENOUS EVERY 10 MIN PRN
Status: DISCONTINUED | OUTPATIENT
Start: 2017-04-04 | End: 2017-04-04 | Stop reason: HOSPADM

## 2017-04-04 RX ORDER — HYDROMORPHONE HYDROCHLORIDE 1 MG/ML
0.6 INJECTION, SOLUTION INTRAMUSCULAR; INTRAVENOUS; SUBCUTANEOUS EVERY 5 MIN PRN
Status: DISCONTINUED | OUTPATIENT
Start: 2017-04-04 | End: 2017-04-04 | Stop reason: HOSPADM

## 2017-04-04 RX ORDER — HYDROMORPHONE HYDROCHLORIDE 1 MG/ML
0.5 INJECTION, SOLUTION INTRAMUSCULAR; INTRAVENOUS; SUBCUTANEOUS EVERY 2 HOUR PRN
Status: DISCONTINUED | OUTPATIENT
Start: 2017-04-04 | End: 2017-04-06

## 2017-04-04 RX ORDER — MORPHINE SULFATE 2 MG/ML
2 INJECTION, SOLUTION INTRAMUSCULAR; INTRAVENOUS EVERY 10 MIN PRN
Status: DISCONTINUED | OUTPATIENT
Start: 2017-04-04 | End: 2017-04-04 | Stop reason: HOSPADM

## 2017-04-04 RX ORDER — HYDROMORPHONE HYDROCHLORIDE 1 MG/ML
0.2 INJECTION, SOLUTION INTRAMUSCULAR; INTRAVENOUS; SUBCUTANEOUS EVERY 5 MIN PRN
Status: DISCONTINUED | OUTPATIENT
Start: 2017-04-04 | End: 2017-04-04 | Stop reason: HOSPADM

## 2017-04-04 RX ORDER — ALVIMOPAN 12 MG/1
12 CAPSULE ORAL ONCE
Status: COMPLETED | OUTPATIENT
Start: 2017-04-04 | End: 2017-04-04

## 2017-04-04 RX ORDER — LIDOCAINE HYDROCHLORIDE 10 MG/ML
INJECTION, SOLUTION EPIDURAL; INFILTRATION; INTRACAUDAL; PERINEURAL AS NEEDED
Status: DISCONTINUED | OUTPATIENT
Start: 2017-04-04 | End: 2017-04-04 | Stop reason: SURG

## 2017-04-04 RX ORDER — EPHEDRINE SULFATE 50 MG/ML
INJECTION, SOLUTION INTRAVENOUS AS NEEDED
Status: DISCONTINUED | OUTPATIENT
Start: 2017-04-04 | End: 2017-04-04 | Stop reason: SURG

## 2017-04-04 RX ORDER — MORPHINE SULFATE 4 MG/ML
4 INJECTION, SOLUTION INTRAMUSCULAR; INTRAVENOUS EVERY 10 MIN PRN
Status: DISCONTINUED | OUTPATIENT
Start: 2017-04-04 | End: 2017-04-04 | Stop reason: HOSPADM

## 2017-04-04 RX ORDER — HEPARIN SODIUM 5000 [USP'U]/ML
5000 INJECTION, SOLUTION INTRAVENOUS; SUBCUTANEOUS ONCE
Status: COMPLETED | OUTPATIENT
Start: 2017-04-04 | End: 2017-04-04

## 2017-04-04 RX ORDER — DEXAMETHASONE SODIUM PHOSPHATE 4 MG/ML
VIAL (ML) INJECTION AS NEEDED
Status: DISCONTINUED | OUTPATIENT
Start: 2017-04-04 | End: 2017-04-04 | Stop reason: SURG

## 2017-04-04 RX ORDER — ENALAPRILAT 2.5 MG/2ML
1.25 INJECTION INTRAVENOUS EVERY 6 HOURS PRN
Status: DISCONTINUED | OUTPATIENT
Start: 2017-04-04 | End: 2017-04-07

## 2017-04-04 RX ORDER — ACETAMINOPHEN 325 MG/1
650 TABLET ORAL ONCE
Status: COMPLETED | OUTPATIENT
Start: 2017-04-04 | End: 2017-04-04

## 2017-04-04 RX ORDER — NALOXONE HYDROCHLORIDE 0.4 MG/ML
80 INJECTION, SOLUTION INTRAMUSCULAR; INTRAVENOUS; SUBCUTANEOUS AS NEEDED
Status: ACTIVE | OUTPATIENT
Start: 2017-04-04 | End: 2017-04-04

## 2017-04-04 RX ORDER — ACETAMINOPHEN 10 MG/ML
1000 INJECTION, SOLUTION INTRAVENOUS EVERY 6 HOURS
Status: DISCONTINUED | OUTPATIENT
Start: 2017-04-04 | End: 2017-04-06

## 2017-04-04 RX ORDER — HYDROCODONE BITARTRATE AND ACETAMINOPHEN 5; 325 MG/1; MG/1
1 TABLET ORAL AS NEEDED
Status: DISCONTINUED | OUTPATIENT
Start: 2017-04-04 | End: 2017-04-04 | Stop reason: HOSPADM

## 2017-04-04 RX ORDER — ONDANSETRON 2 MG/ML
INJECTION INTRAMUSCULAR; INTRAVENOUS AS NEEDED
Status: DISCONTINUED | OUTPATIENT
Start: 2017-04-04 | End: 2017-04-04 | Stop reason: SURG

## 2017-04-04 RX ORDER — DEXTROSE MONOHYDRATE 25 G/50ML
50 INJECTION, SOLUTION INTRAVENOUS AS NEEDED
Status: DISCONTINUED | OUTPATIENT
Start: 2017-04-04 | End: 2017-04-04

## 2017-04-04 RX ORDER — BUPIVACAINE HYDROCHLORIDE 2.5 MG/ML
INJECTION, SOLUTION EPIDURAL; INFILTRATION; INTRACAUDAL AS NEEDED
Status: DISCONTINUED | OUTPATIENT
Start: 2017-04-04 | End: 2017-04-04 | Stop reason: HOSPADM

## 2017-04-04 RX ORDER — HYDROCODONE BITARTRATE AND ACETAMINOPHEN 5; 325 MG/1; MG/1
2 TABLET ORAL AS NEEDED
Status: DISCONTINUED | OUTPATIENT
Start: 2017-04-04 | End: 2017-04-04 | Stop reason: HOSPADM

## 2017-04-04 RX ORDER — SUCCINYLCHOLINE CHLORIDE 20 MG/ML
INJECTION INTRAMUSCULAR; INTRAVENOUS AS NEEDED
Status: DISCONTINUED | OUTPATIENT
Start: 2017-04-04 | End: 2017-04-04 | Stop reason: SURG

## 2017-04-04 RX ORDER — FAMOTIDINE 20 MG/1
20 TABLET ORAL ONCE
Status: COMPLETED | OUTPATIENT
Start: 2017-04-04 | End: 2017-04-04

## 2017-04-04 RX ORDER — DEXTROSE MONOHYDRATE 25 G/50ML
50 INJECTION, SOLUTION INTRAVENOUS AS NEEDED
Status: DISCONTINUED | OUTPATIENT
Start: 2017-04-04 | End: 2017-04-07

## 2017-04-04 RX ORDER — ONDANSETRON 2 MG/ML
4 INJECTION INTRAMUSCULAR; INTRAVENOUS ONCE AS NEEDED
Status: DISCONTINUED | OUTPATIENT
Start: 2017-04-04 | End: 2017-04-04 | Stop reason: HOSPADM

## 2017-04-04 RX ORDER — MIDAZOLAM HYDROCHLORIDE 1 MG/ML
INJECTION INTRAMUSCULAR; INTRAVENOUS AS NEEDED
Status: DISCONTINUED | OUTPATIENT
Start: 2017-04-04 | End: 2017-04-04 | Stop reason: SURG

## 2017-04-04 RX ORDER — NEOSTIGMINE METHYLSULFATE 0.5 MG/ML
INJECTION INTRAVENOUS AS NEEDED
Status: DISCONTINUED | OUTPATIENT
Start: 2017-04-04 | End: 2017-04-04 | Stop reason: SURG

## 2017-04-04 RX ORDER — HYDROMORPHONE HYDROCHLORIDE 1 MG/ML
0.4 INJECTION, SOLUTION INTRAMUSCULAR; INTRAVENOUS; SUBCUTANEOUS EVERY 5 MIN PRN
Status: DISCONTINUED | OUTPATIENT
Start: 2017-04-04 | End: 2017-04-04 | Stop reason: HOSPADM

## 2017-04-04 RX ORDER — ROCURONIUM BROMIDE 10 MG/ML
INJECTION, SOLUTION INTRAVENOUS AS NEEDED
Status: DISCONTINUED | OUTPATIENT
Start: 2017-04-04 | End: 2017-04-04 | Stop reason: SURG

## 2017-04-04 RX ORDER — METRONIDAZOLE 500 MG/100ML
500 INJECTION, SOLUTION INTRAVENOUS ONCE
Status: COMPLETED | OUTPATIENT
Start: 2017-04-04 | End: 2017-04-04

## 2017-04-04 RX ORDER — FIBRINOGEN HUMAN AND THROMBIN HUMAN 1 ML
KIT TOPICAL AS NEEDED
Status: DISCONTINUED | OUTPATIENT
Start: 2017-04-04 | End: 2017-04-04 | Stop reason: HOSPADM

## 2017-04-04 RX ORDER — SODIUM CHLORIDE, SODIUM LACTATE, POTASSIUM CHLORIDE, CALCIUM CHLORIDE 600; 310; 30; 20 MG/100ML; MG/100ML; MG/100ML; MG/100ML
INJECTION, SOLUTION INTRAVENOUS CONTINUOUS
Status: DISCONTINUED | OUTPATIENT
Start: 2017-04-04 | End: 2017-04-05

## 2017-04-04 RX ORDER — ONDANSETRON 2 MG/ML
4 INJECTION INTRAMUSCULAR; INTRAVENOUS EVERY 6 HOURS PRN
Status: DISCONTINUED | OUTPATIENT
Start: 2017-04-04 | End: 2017-04-07

## 2017-04-04 RX ORDER — GLYCOPYRROLATE 0.2 MG/ML
INJECTION INTRAMUSCULAR; INTRAVENOUS AS NEEDED
Status: DISCONTINUED | OUTPATIENT
Start: 2017-04-04 | End: 2017-04-04 | Stop reason: SURG

## 2017-04-04 RX ORDER — SODIUM CHLORIDE, SODIUM LACTATE, POTASSIUM CHLORIDE, CALCIUM CHLORIDE 600; 310; 30; 20 MG/100ML; MG/100ML; MG/100ML; MG/100ML
INJECTION, SOLUTION INTRAVENOUS CONTINUOUS
Status: DISCONTINUED | OUTPATIENT
Start: 2017-04-04 | End: 2017-04-07

## 2017-04-04 RX ORDER — GABAPENTIN 300 MG/1
900 CAPSULE ORAL ONCE
Status: COMPLETED | OUTPATIENT
Start: 2017-04-04 | End: 2017-04-04

## 2017-04-04 RX ORDER — METOCLOPRAMIDE 10 MG/1
10 TABLET ORAL ONCE
Status: COMPLETED | OUTPATIENT
Start: 2017-04-04 | End: 2017-04-04

## 2017-04-04 RX ADMIN — GLYCOPYRROLATE 0.4 MG: 0.2 INJECTION INTRAMUSCULAR; INTRAVENOUS at 14:20:00

## 2017-04-04 RX ADMIN — LIDOCAINE HYDROCHLORIDE 50 MG: 10 INJECTION, SOLUTION EPIDURAL; INFILTRATION; INTRACAUDAL; PERINEURAL at 10:15:00

## 2017-04-04 RX ADMIN — SUCCINYLCHOLINE CHLORIDE 160 MG: 20 INJECTION INTRAMUSCULAR; INTRAVENOUS at 10:15:00

## 2017-04-04 RX ADMIN — MIDAZOLAM HYDROCHLORIDE 2 MG: 1 INJECTION INTRAMUSCULAR; INTRAVENOUS at 10:13:00

## 2017-04-04 RX ADMIN — DEXAMETHASONE SODIUM PHOSPHATE 4 MG: 4 MG/ML VIAL (ML) INJECTION at 10:59:00

## 2017-04-04 RX ADMIN — ROCURONIUM BROMIDE 20 MG: 10 INJECTION, SOLUTION INTRAVENOUS at 11:25:00

## 2017-04-04 RX ADMIN — ROCURONIUM BROMIDE 5 MG: 10 INJECTION, SOLUTION INTRAVENOUS at 10:15:00

## 2017-04-04 RX ADMIN — GLYCOPYRROLATE 0.2 MG: 0.2 INJECTION INTRAMUSCULAR; INTRAVENOUS at 10:14:00

## 2017-04-04 RX ADMIN — SODIUM CHLORIDE, SODIUM LACTATE, POTASSIUM CHLORIDE, CALCIUM CHLORIDE: 600; 310; 30; 20 INJECTION, SOLUTION INTRAVENOUS at 14:43:00

## 2017-04-04 RX ADMIN — ONDANSETRON 4 MG: 2 INJECTION INTRAMUSCULAR; INTRAVENOUS at 14:10:00

## 2017-04-04 RX ADMIN — SODIUM CHLORIDE: 9 INJECTION, SOLUTION INTRAVENOUS at 10:18:00

## 2017-04-04 RX ADMIN — EPHEDRINE SULFATE 10 MG: 50 INJECTION, SOLUTION INTRAVENOUS at 11:10:00

## 2017-04-04 RX ADMIN — EPHEDRINE SULFATE 10 MG: 50 INJECTION, SOLUTION INTRAVENOUS at 14:05:00

## 2017-04-04 RX ADMIN — METRONIDAZOLE 500 MG: 500 INJECTION, SOLUTION INTRAVENOUS at 10:53:00

## 2017-04-04 RX ADMIN — ROCURONIUM BROMIDE 45 MG: 10 INJECTION, SOLUTION INTRAVENOUS at 10:22:00

## 2017-04-04 RX ADMIN — SODIUM CHLORIDE, SODIUM LACTATE, POTASSIUM CHLORIDE, CALCIUM CHLORIDE: 600; 310; 30; 20 INJECTION, SOLUTION INTRAVENOUS at 10:13:00

## 2017-04-04 RX ADMIN — NEOSTIGMINE METHYLSULFATE 5 MG: 0.5 INJECTION INTRAVENOUS at 14:20:00

## 2017-04-04 NOTE — CONSULTS
[facility] HOSP - [facility]    Report of Consultation    Forrest Ricks Patient Status:  Surgery Admit    1965 MRN [id number]   Location [facility] UNIT Attending Mansi Ojeda MD   Hosp Day # 0 PCP Chely Matthews Obesity Sister    • Glaucoma Neg    • Cancer Maternal Grandfather    • Other [Other] [OTHER] Sister      FIBROMIALGIA       Social History  Patient Guardian Status:  Not on file.     Other Topics            Concern  Caffeine Concern        Yes    Comment:2- NaCl (FLAGYL) 5 mg/ml IVPB premix 500 mg 500 mg Intravenous Q8H       Prescriptions prior to admission:  amoxicillin 500 MG Oral Tab Take 500 mg by mouth 2 (two) times daily. clarithromycin 250 MG Oral Tab Take 250 mg by mouth daily.    clarithromycin 500 weakness, dizziness,   Psychiatric: denies depression, anxiety  Hematologic: denies bruising        Physical Exam:   Blood pressure 111/67, pulse 99, temperature 99.9 °F (37.7 °C), temperature source Oral, resp.  rate 24, height 5' 11\" (1.803 m), weight 38 [address]  4/4/2017  3:12 PM

## 2017-04-04 NOTE — OPERATIVE REPORT
ESOPHAGOGASTRODUODENOSCOPY (EGD) REPORT    Sandy Saeed    PABLO 1965 Age 47year old   PCP Libertad Welch MD Endoscopist Laura Jay MD     Date of procedure: 2017    Procedure: EGD     Pre-operative diagnosis: Gastric GIST pathology. 2. Post-surgical care with Dr. Oren Napoles. 3. Patient should f/u with me as an outpatient after hospitalization, at that time will check stool H.pylori antigen to ensure eradication.     Specimens:  none

## 2017-04-04 NOTE — PROGRESS NOTES
[facility] HOSP - [facility]    Progress Note    Jerardo Llamas Patient Status:  Surgery Admit    1965 MRN [id number]   Location [address] [facility] Attending Bacilio Ya MD   Hosp Day # 0 PCP Courtney Bateman MD Essential hypertension  CONT TO MONITOR. CAMACHO (obstructive sleep apnea)  HOLD ON CPAP WHILE NG IN PLACE.        Type 2 diabetes mellitus without complication, without long-term current use of insulin (HCC)  MONITOR ACCU CHECKS, INSULIN REGULAR CO

## 2017-04-04 NOTE — ANESTHESIA PREPROCEDURE EVALUATION
Anesthesia PreOp Note    HPI:     Forrest Ricks is a 47year old male who presents for preoperative consultation requested by: Mansi Ojeda MD    Date of Surgery: 4/4/2017    Procedure(s):  ROBOT-ASSISTED LAPAROSCOPIC PARTIAL GASTRECTOMY  Indication: 2/2017     Will start abx in March 2017   • Candida infection 2/2017     Esophagitis; on diflucan   • Arthritis    • Diabetes ([facility] [address]    • High blood pressure    • Unspecified sleep apnea 2/1/2012     NOT USING CPAP   • Degenerative joint disease Take 1 tablet (500 mg total) by mouth 2 (two) times daily. (Patient taking differently: Take 1,000 mg by mouth nightly.  ) Disp: 60 tablet Rfl: 5 3/31/2017   Atorvastatin Calcium 10 MG Oral Tab Take 1 tablet (10 mg total) by mouth nightly.  Disp: 90 tablet 03/22/2017    03/22/2017   MPV 9.1 03/22/2017       Lab Results  Component Value Date    03/22/2017   K 3.8 03/22/2017    03/22/2017   CO2 25 03/22/2017   BUN 14 03/22/2017   CREATSERUM 1.02 03/22/2017   * 03/22/2017   CA 9.5 03/2

## 2017-04-04 NOTE — H&P
[facility]      Patient Name:  Brittney De Anda   Date of Birth:   2/26/1965    Gender:  male    Appt Date:   04/04/2016    Provider:  Donnie Sanabria MD    Insurance:    [employer]      Chief Complaint:  Evaluation for Gas     Esophagitis; on diflucan    •  Arthritis      •  Diabetes (HCC)              Past Surgical History      EGD  N/A  2/22/2017      Comment  Procedure: ESOPHAGOGASTRODUODENOSCOPY (EGD);  Surgeon: Rufina Amin MD;  Location: [address] [place] [address] ENDOSCOPY      [facility] •  simethicone 125 MG Oral Chew Tab, Chew 1 tablet (125 mg total) by mouth every 6 (six) hours as needed for FLATULENCE., Disp: 80 tablet, Rfl: 0  •  Pantoprazole Sodium (PROTONIX) 40 MG Oral Tab EC, Take 1 tablet (40 mg total) by mouth every morning befor CA  9.3  02/22/2017    NA  139  02/22/2017    K  3.9  02/22/2017    CL  105  02/22/2017    CO2  22  02/22/2017    OSMOCALC  295  02/22/2017      HGBA1C:  No results found for: A1C    Lab Results  Component  Value  Date    WBC  9.4  02/22/2017    RBC  4.77  [address] [facility], [address]  [facility] AND CLINICS  [place] [address]   [address], [address]  T: [phone]  F: [phone]

## 2017-04-04 NOTE — ANESTHESIA POSTPROCEDURE EVALUATION
Patient: Wesly Herrmann    Procedure Summary     Date Anesthesia Start Anesthesia Stop Room / Location    04/04/17 1013  [facility] OR 07 / [facility] OR       Procedure Diagnosis Surgeon Responsible Provider    ROBOT-ASSISTED LAPAROSCOPIC PARTIAL GASTRECTOMY

## 2017-04-05 PROCEDURE — 99233 SBSQ HOSP IP/OBS HIGH 50: CPT | Performed by: HOSPITALIST

## 2017-04-05 PROCEDURE — 99232 SBSQ HOSP IP/OBS MODERATE 35: CPT | Performed by: INTERNAL MEDICINE

## 2017-04-05 RX ORDER — HEPARIN SODIUM 5000 [USP'U]/ML
5000 INJECTION, SOLUTION INTRAVENOUS; SUBCUTANEOUS EVERY 8 HOURS
Status: DISCONTINUED | OUTPATIENT
Start: 2017-04-05 | End: 2017-04-07

## 2017-04-05 NOTE — PROGRESS NOTES
[facility] H[facility]P - [facility]     Progress Note        Lex Jasso Patient Status:  Inpatient    1965 MRN [id number]   Location [facility] 2W/SW Attending Tara Wallace MD   [facility] Day # 1 PCP Yvonne Guido MD       Subjective:   Pa EOMI, nares patents, no nasal polyps   Cardio: RRR, S1 S2  Respiratory: clear to auscultation bilaterally, no wheezing, rales, rhonchi, crackles  GI: abdomen soft, abdominal binder in place  Extremities: no clubbing, cyanosis, edema  Neurologic: no gross m

## 2017-04-05 NOTE — PROGRESS NOTES
[facility]  Progress Note    Ayesha Crews Patient Status:  Inpatient    1965 MRN [id number]   Location [facility] 2W/SW Attending Chuck Hart MD   [facility] Day # 1 PCP Felix Knowles MD     Subjective:  Doing [address] [place] [address] [place].   [place], [address] [place] [address]  T: [phone]  F: [phone]

## 2017-04-05 NOTE — PROGRESS NOTES
PT RECEIVED FROM PACU TO ROOM 204 IN STABLE CONDITION S/P ROBOTIC PARTIAL GASTRECTOMY W/ REMOVAL OF GIST TUMOR, OPEN UMBILICAL HERNIA REPAIR, & EGD. VSS ON 4L O2 VIA NC. AFEBRILE. C/O MILD PAIN AT THIS TIME.  INCISIONS UNREMARKABLE; SURGICAL DRSG CDI; ABDOM

## 2017-04-05 NOTE — RESPIRATORY THERAPY NOTE
CAMACHO ASSESSMENT:    Pt  have a previous diagnosis of CAMACHO.  Pt does not  use a CPAP device at home for the last  2years    CPAP INITIATION:    Pt to be placed on CPAP: no

## 2017-04-05 NOTE — OPERATIVE REPORT
[facility]  Operative Report    Casie Lawson Location: OR   [facility] [id number] MRN [id number]   Admission Date 4/4/2017 Operation Date 4/4/2017   Attending Physician Juan Hamilton MD Operating Physician Arnel Parrish MD       Pre-O  We started with midline supraumbilical port insertion under vision. Pneumoperitoneum was established. Two  robotic ports were placed in right upper quadrant region. One 8 mm was placed in right subcostal region. Robot was next docked.   We started with exp

## 2017-04-05 NOTE — CM/SW NOTE
CTL/Rounds: Spoke with patient at bedside regarding discharge planning. Patient is independent with ADLs and driving. He uses a cane due to bad knees. He is currently on disability. He lives with his wife, who is in good health.  She is out to work during t

## 2017-04-05 NOTE — PLAN OF CARE
Problem: Diabetes/Glucose Control  Goal: Glucose maintained within prescribed range  INTERVENTIONS:  - Monitor Blood Glucose as ordered  - Assess for signs and symptoms of hyperglycemia and hypoglycemia  - Administer ordered medications to maintain glucose function  - Maintain adequate hydration with IV or PO as ordered and tolerated  - Evaluate effectiveness of GI medications  - Encourage mobilization and activity  - Obtain nutritional consult as needed  - Establish a toileting routine/schedule  - Consider

## 2017-04-05 NOTE — PROGRESS NOTES
[place] [facility]     Progress Note        Sisi Mancia Patient Status:  Inpatient    1965 MRN [id number]   Location [facility] 2W/SW Attending Nahomy Horner MD   [address] Day # 1 PCP Michelle Ruelas MD       Subjective: S2  Respiratory: clear to auscultation bilaterally, no wheezing, rales, rhonchi, crackles  GI: abdomen soft, abdominal binder in place  Extremities: no clubbing, cyanosis, edema  Neurologic: no acute focal deficit  Skin: warm, dry  Psych: normal affect

## 2017-04-05 NOTE — PHYSICAL THERAPY NOTE
PHYSICAL THERAPY EVALUATION - INPATIENT     Room Number: 204/204-A  Evaluation Date: 4/5/2017  Type of Evaluation: Initial  Physician Order: PT Eval and Treat    Presenting Problem: GIST s/p robotic partial gastrectomy with tumor removal and umbilical without complication, without long-term current use of insulin ([facility] [address]      Past Medical History  Past Medical History   Diagnosis Date   • Unspecified essential hypertension    • Depression    • Anxiety state, unspecified    • Obesity, unspecified    • Sarc functional limits    BALANCE  Static Sitting: Good  Dynamic Sitting: Good  Static Standing: Fair  Dynamic Standing: Fair -    ADDITIONAL TESTS  Additional Tests: None                                 NEUROLOGICAL FINDINGS  Neurological Findings: None Sit to/from Stand at assistance level: modified independent with cane - straight     Goal #2  Current Status    Goal #3 Patient is able to ambulate 400 feet with assist device: cane - straight at assistance level: modified independent   Goal #3   Current S

## 2017-04-06 PROCEDURE — 99232 SBSQ HOSP IP/OBS MODERATE 35: CPT | Performed by: INTERNAL MEDICINE

## 2017-04-06 PROCEDURE — 99233 SBSQ HOSP IP/OBS HIGH 50: CPT | Performed by: HOSPITALIST

## 2017-04-06 RX ORDER — HYDROCODONE BITARTRATE AND ACETAMINOPHEN 10; 325 MG/1; MG/1
1 TABLET ORAL EVERY 4 HOURS PRN
Status: DISCONTINUED | OUTPATIENT
Start: 2017-04-06 | End: 2017-04-06

## 2017-04-06 RX ORDER — HYDROMORPHONE HYDROCHLORIDE 2 MG/1
2 TABLET ORAL EVERY 4 HOURS PRN
Status: DISCONTINUED | OUTPATIENT
Start: 2017-04-06 | End: 2017-04-07

## 2017-04-06 RX ORDER — BISACODYL 10 MG
10 SUPPOSITORY, RECTAL RECTAL
Status: DISCONTINUED | OUTPATIENT
Start: 2017-04-06 | End: 2017-04-07

## 2017-04-06 RX ORDER — ACETAMINOPHEN 325 MG/1
650 TABLET ORAL EVERY 6 HOURS PRN
Status: DISCONTINUED | OUTPATIENT
Start: 2017-04-06 | End: 2017-04-07

## 2017-04-06 RX ORDER — DOCUSATE SODIUM 100 MG/1
100 CAPSULE, LIQUID FILLED ORAL 2 TIMES DAILY
Status: DISCONTINUED | OUTPATIENT
Start: 2017-04-06 | End: 2017-04-07

## 2017-04-06 RX ORDER — IBUPROFEN 600 MG/1
600 TABLET ORAL EVERY 6 HOURS PRN
Status: DISCONTINUED | OUTPATIENT
Start: 2017-04-06 | End: 2017-04-06

## 2017-04-06 RX ORDER — POTASSIUM CHLORIDE 20 MEQ/1
40 TABLET, EXTENDED RELEASE ORAL EVERY 4 HOURS
Status: COMPLETED | OUTPATIENT
Start: 2017-04-06 | End: 2017-04-06

## 2017-04-06 RX ORDER — 0.9 % SODIUM CHLORIDE 0.9 %
VIAL (ML) INJECTION
Status: COMPLETED
Start: 2017-04-06 | End: 2017-04-06

## 2017-04-06 NOTE — PHYSICAL THERAPY NOTE
Attempted physical therapy treatment. Patient presented in bed eating. Patient declined as he was eating. States he had walked with PCT earlier. Will re-attempt as patient is available. MASOUD Marcus notified of plan.

## 2017-04-06 NOTE — PROGRESS NOTES
[place] [facility]     Progress Note        Lacey Felty Patient Status:  Inpatient    1965 MRN [id number]   Location [facility] 2W/SW Attending Arzella Saint, MD   [facility] Day # 2 PCP Sean Edmond MD       Subjective:   Pa 04/06/17 0918       Physical Exam  Constitutional: no acute distress, alert, oriented  HEENT: PERRL, EOMI, nares patents, no nasal polyps   Cardio: RRR, S1 S2  Respiratory: clear to auscultation bilaterally, no wheezing, rales, rhonchi, crackles  GI: abdom

## 2017-04-06 NOTE — PROGRESS NOTES
[place] [facility] - [facility]     Progress Note        Chastity Slim Patient Status:  Inpatient    1965 MRN [id number]   Location [facility] 2W/SW Attending Laurent Anguiano MD   [facility] Day # 2 PCP Octavio Holt MD       Subjective: acute distress, alert, oriented x3  HEENT: PERRL, EOMI mmm  Cardio: RRR, S1 S2  Respiratory: clear to auscultation bilaterally, no wheezing, rales, rhonchi, crackles  GI: abdomen soft, abdominal binder in place  Extremities: no clubbing, cyanosis, edema  N

## 2017-04-07 VITALS
TEMPERATURE: 98 F | OXYGEN SATURATION: 94 % | HEART RATE: 97 BPM | DIASTOLIC BLOOD PRESSURE: 93 MMHG | RESPIRATION RATE: 18 BRPM | SYSTOLIC BLOOD PRESSURE: 138 MMHG | WEIGHT: 315 LBS | HEIGHT: 71 IN | BODY MASS INDEX: 44.1 KG/M2

## 2017-04-07 PROCEDURE — 99239 HOSP IP/OBS DSCHRG MGMT >30: CPT | Performed by: HOSPITALIST

## 2017-04-07 RX ORDER — ACETAMINOPHEN 325 MG/1
650 TABLET ORAL EVERY 6 HOURS PRN
Qty: 80 TABLET | Refills: 0 | Status: SHIPPED | OUTPATIENT
Start: 2017-04-07

## 2017-04-07 RX ORDER — PANTOPRAZOLE SODIUM 20 MG/1
20 TABLET, DELAYED RELEASE ORAL DAILY
Qty: 30 TABLET | Refills: 3 | Status: ON HOLD | OUTPATIENT
Start: 2017-04-07 | End: 2019-06-19

## 2017-04-07 NOTE — PROGRESS NOTES
[facility]  Progress Note    Julian Clyde Patient Status:  Inpatient    1965 MRN [id number]   Location [facility] 2W/SW Attending Adams Padilla MD   [facility] Day # 3 PCP Sandi Goldstein MD     Subjective:  POD # Dispo: Discharge appropriate for today. All discharge instructions reviewed with patient. He will follow up in clinic in one week.   Contact information provided    Electronically signed by:    RHONDA Pyle  [address]

## 2017-04-07 NOTE — DISCHARGE SUMMARY
[facility]    Discharge Summary    Angeles Santana Patient Status:  Inpatient    1965 MRN [id number]   Location [facility] 4W/SW/SE Attending Poncho Rodgers MD   Hosp Day # 3 PCP Mignon Mendez MD     Date of Admiss present  Neuro: No acute focal deficits  MSK: Full range of motion in extremities  Skin: Warm and dry  Psych: Normal affect  Ext: no c/c/e      History of Present Illness: Patient is a 48 yo male with a PMH of GIST tumor who presents for removal of tumor. 60 tablet   Refills:  5         CONTINUE taking these medications       Instructions Prescription details    AmLODIPine Besylate 5 MG Tabs   Commonly known as:  NORVASC        Take 1 tablet (5 mg total) by mouth daily.     Quantity:  30 tablet   Refills:  6

## 2017-04-07 NOTE — PLAN OF CARE
Diabetes/Glucose Control    • Glucose maintained within prescribed range Adequate for Discharge        GASTROINTESTINAL - ADULT    • Minimal or absence of nausea and vomiting Adequate for Discharge    • Maintains or returns to baseline bowel function Adequ

## 2017-04-08 ENCOUNTER — TELEPHONE (OUTPATIENT)
Dept: MEDSURG UNIT | Facility: HOSPITAL | Age: 52
End: 2017-04-08

## 2017-04-10 ENCOUNTER — TELEPHONE (OUTPATIENT)
Dept: INTERNAL MEDICINE UNIT | Facility: HOSPITAL | Age: 52
End: 2017-04-10

## 2017-04-10 NOTE — TELEPHONE ENCOUNTER
Patient discharged from [facility] on April 7, 2017. Please call patient to schedule hospital follow-up appointment with PCP, Dr. Sam Soriano.

## 2017-04-11 ENCOUNTER — TELEPHONE (OUTPATIENT)
Dept: SURGERY | Facility: CLINIC | Age: 52
End: 2017-04-11

## 2017-04-11 NOTE — TELEPHONE ENCOUNTER
[id number] - spoke with patient. He is doing well. Tolerating diet. No nausea. Pain is fairly well controlled with 2 ES tylenol twice daily. Confirmed f/u appointment on Thursday.

## 2017-04-11 NOTE — TELEPHONE ENCOUNTER
Post discharge call to patient for status update. Both contact numbers attempted.   Message left on cell number for patient to call back    Electronically signed by:    Susy Sheffield, ANP-[address]

## 2017-04-13 ENCOUNTER — OFFICE VISIT (OUTPATIENT)
Dept: SURGERY | Facility: CLINIC | Age: 52
End: 2017-04-13

## 2017-04-13 VITALS
BODY MASS INDEX: 44.1 KG/M2 | OXYGEN SATURATION: 93 % | DIASTOLIC BLOOD PRESSURE: 85 MMHG | HEIGHT: 71 IN | SYSTOLIC BLOOD PRESSURE: 135 MMHG | WEIGHT: 315 LBS | HEART RATE: 99 BPM | TEMPERATURE: 97 F

## 2017-04-13 DIAGNOSIS — C49.A2 MALIGNANT GASTROINTESTINAL STROMAL TUMOR (GIST) OF STOMACH (HCC): Primary | ICD-10-CM

## 2017-04-13 PROCEDURE — 99024 POSTOP FOLLOW-UP VISIT: CPT | Performed by: NURSE PRACTITIONER

## 2017-04-13 NOTE — PROGRESS NOTES
[facility] Surgical Oncology    Patient Name:  Sandy Saeed   YOB: 1965   Gender: male   Appt Date: 4/13/2017    Provider:  TABBY Craig    Insurance:   [id number] MA HMO-POS     Chief Complaint:  Post op GIST tumor    Histor • Anxiety state, unspecified    • Obesity, unspecified    • Sarcoidosis ([facility] [address] 2/1/2002   • Gastrointestinal stromal tumor (GIST) ([facility] [address] 2/2017     Gastric 7 cm GIST. Dx incidentally after CT scan, EUS/FNA.     • Helicobacter positive gastritis 2/2017     [facility] •  Glucose Blood (FREESTYLE TEST) In Vitro Strip, Test blood sugar twice daily as directed, Disp: 100 strip, Rfl: 3  •  MetFORMIN HCl 500 MG Oral Tab, Take 1 tablet (500 mg total) by mouth 2 (two) times daily.  (Patient taking differently: Take 1,000 mg by WBC 7.1 04/07/2017   RBC 4.38* 04/07/2017   HGB 11.8* 04/07/2017   HCT 35.6* 04/07/2017   MCV 81.1 04/07/2017   MCH 27.0 04/07/2017   MCHC 33.3 04/07/2017   RDW 15.3* 04/07/2017    04/07/2017   MPV 8.1 04/07/2017     Pathology:    A.  Stomach; Ruth Moder

## 2017-04-17 ENCOUNTER — NURSE NAVIGATOR ENCOUNTER (OUTPATIENT)
Dept: HEMATOLOGY/ONCOLOGY | Facility: HOSPITAL | Age: 52
End: 2017-04-17

## 2017-04-17 NOTE — PROGRESS NOTES
NN contacted Diana Griffiths to discuss appt with Dr Dilip Martino as recommended by Dr Lilia Benoit. Diana Griffiths did not schedule this due to not understanding why it was necessary.   Explained findings of hydronephrosis on recent imaging exam.  Dr Dilip Martino had openings tomorrow b

## 2017-04-18 NOTE — PROGRESS NOTES
Message left for Susan Lujan that an appt with Dr Gabriel Francois was rescheduled for Tuesday April 25, following his appt with Dr Jeanie Fabry. Requested Francisco J call NN back to confirm this appt time is acceptable.

## 2017-04-21 ENCOUNTER — NURSE NAVIGATOR ENCOUNTER (OUTPATIENT)
Dept: HEMATOLOGY/ONCOLOGY | Facility: HOSPITAL | Age: 52
End: 2017-04-21

## 2017-04-25 ENCOUNTER — OFFICE VISIT (OUTPATIENT)
Dept: HEMATOLOGY/ONCOLOGY | Facility: HOSPITAL | Age: 52
End: 2017-04-25
Attending: INTERNAL MEDICINE
Payer: MEDICARE

## 2017-04-25 ENCOUNTER — APPOINTMENT (OUTPATIENT)
Dept: SURGERY | Facility: CLINIC | Age: 52
End: 2017-04-25

## 2017-04-25 VITALS
BODY MASS INDEX: 44.1 KG/M2 | SYSTOLIC BLOOD PRESSURE: 138 MMHG | WEIGHT: 315 LBS | OXYGEN SATURATION: 95 % | TEMPERATURE: 98 F | HEART RATE: 100 BPM | DIASTOLIC BLOOD PRESSURE: 89 MMHG | HEIGHT: 71 IN

## 2017-04-25 VITALS
HEART RATE: 91 BPM | HEIGHT: 71 IN | DIASTOLIC BLOOD PRESSURE: 82 MMHG | SYSTOLIC BLOOD PRESSURE: 151 MMHG | RESPIRATION RATE: 16 BRPM | TEMPERATURE: 98 F | BODY MASS INDEX: 44.1 KG/M2 | WEIGHT: 315 LBS

## 2017-04-25 DIAGNOSIS — Z51.11 ENCOUNTER FOR CHEMOTHERAPY MANAGEMENT: ICD-10-CM

## 2017-04-25 DIAGNOSIS — N20.0 NEPHROLITHIASIS: ICD-10-CM

## 2017-04-25 DIAGNOSIS — C49.A2 GASTROINTESTINAL STROMAL TUMOR (GIST) OF STOMACH (HCC): Primary | ICD-10-CM

## 2017-04-25 PROCEDURE — 99215 OFFICE O/P EST HI 40 MIN: CPT | Performed by: INTERNAL MEDICINE

## 2017-04-25 PROCEDURE — G0463 HOSPITAL OUTPT CLINIC VISIT: HCPCS | Performed by: INTERNAL MEDICINE

## 2017-04-25 PROCEDURE — 99024 POSTOP FOLLOW-UP VISIT: CPT | Performed by: SURGERY

## 2017-04-25 RX ORDER — IMATINIB MESYLATE 400 MG/1
400 TABLET, FILM COATED ORAL DAILY
Qty: 30 TABLET | Refills: 5 | Status: SHIPPED | OUTPATIENT
Start: 2017-04-25 | End: 2019-07-18

## 2017-04-25 NOTE — PROGRESS NOTES
[facility]      Patient Name:  Chandni Ocampo   Date of Birth:   2/26/1965    Gender:  male    Appt Date:  4/25/2017     Provider:  TABBY Pool     Insurance:    [id number] MA HMO-POS      Chief Complaint:  Post op GIST t •  Gastrointestinal stromal tumor (GIST) (HCC)  2/2017        Gastric 7 cm GIST.  Dx incidentally after CT scan, EUS/FNA.     •  Helicobacter positive gastritis  2/2017        Will start abx in March 2017    •  Candida infection  2/2017        Esophagitis; •  Glucose Blood (FREESTYLE TEST) In Vitro Strip, Test blood sugar twice daily as directed, Disp: 100 strip, Rfl: 3  •  MetFORMIN HCl 500 MG Oral Tab, Take 1 tablet (500 mg total) by mouth 2 (two) times daily.  (Patient taking differently: Take 1,000 mg by Component  Value  Date    WBC  7.1  04/07/2017    RBC  4.38*  04/07/2017    HGB  11.8*  04/07/2017    HCT  35.6*  04/07/2017    MCV  81.1  04/07/2017    MCH  27.0  04/07/2017    MCHC  33.3  04/07/2017    RDW  15.3*  04/07/2017    PLT  205  04/07/2017    MP

## 2017-04-25 NOTE — PROGRESS NOTES
Cancer Center Progress Note    Patient Name: Silvia Font   YOB: 1965   Medical Record Number: [id number]   Attending Physician: Deidre Davis M.D.        Chief Complaint:  GIST of the stomach    History of Present Illness:  Cancer history: Stroke Mother    • Psychiatric Mother      dementia   • Diabetes Sister    • Obesity Sister    • Glaucoma Neg    • Cancer Maternal Grandfather    • Other [Other] Robinson Craotto Sister      FIBROMIALGIA       Social History:    Social History   Marital Status: Mar Blood (FREESTYLE TEST) In Vitro Strip, Test blood sugar twice daily as directed, Disp: 100 strip, Rfl: 3  •  MetFORMIN HCl 500 MG Oral Tab, Take 1 tablet (500 mg total) by mouth 2 (two) times daily.  (Patient taking differently: Take 1,000 mg by mouth daily Dr. Jeanie Fabry 4/4/17 with a tumor measuring 7 cm in greatest dimension with 6 mitoses per high-power field. –His tumor is high risk based on size and mitotic rate.   His estimated 2 year recurrence free survival is 40% ([facility])  –In the setting we rec

## 2017-05-04 ENCOUNTER — TELEPHONE (OUTPATIENT)
Dept: HEMATOLOGY/ONCOLOGY | Facility: HOSPITAL | Age: 52
End: 2017-05-04

## 2017-05-04 NOTE — TELEPHONE ENCOUNTER
PLEASE CALL PT TODAY -[phone]. PT WOULD LIKE TO KNOW IF THERE IS AN ALTERNATE MED HE CAN GET INSTEAD OF GLEEVEC.  HIS INS WILL NOT COVER GLEEVEC-MKV

## 2017-05-05 ENCOUNTER — NURSE NAVIGATOR ENCOUNTER (OUTPATIENT)
Dept: HEMATOLOGY/ONCOLOGY | Facility: HOSPITAL | Age: 52
End: 2017-05-05

## 2017-05-05 NOTE — PROGRESS NOTES
Follow up call placed to [facility] regarding Distress Screen completed at 4/25 visit. What language is spoken in your home most of the time? English    How do you usually get to your appointments?   Drive    How difficult is it for you to get to your appoi

## 2017-05-05 NOTE — TELEPHONE ENCOUNTER
Sunil Anderson was contacted by Karolyn Mejia [facility].  Sunil Anderson will be completing paperwork tomorrow and this will be submitted.

## 2017-05-10 ENCOUNTER — NURSE NAVIGATOR ENCOUNTER (OUTPATIENT)
Dept: HEMATOLOGY/ONCOLOGY | Facility: HOSPITAL | Age: 52
End: 2017-05-10

## 2017-05-10 NOTE — PROGRESS NOTES
Message left for Clifford Aguilar to verify he picked up Financial Assistance paperwork, encouraged to call back with questions and to arrange time to deliver to [address] [place] [address].

## 2017-05-11 ENCOUNTER — TELEPHONE (OUTPATIENT)
Dept: ADMINISTRATIVE | Age: 52
End: 2017-05-11

## 2017-05-11 NOTE — TELEPHONE ENCOUNTER
Good morning,  Pts spouse requesting intermittent FMLA for her to help with spouse. She missed retro time while he was in the hospital and needs future time off to help him with recovery and to help with transportation.   May I provide I will complete the f

## 2017-05-12 NOTE — TELEPHONE ENCOUNTER
This should be ok if pt was admitted , but i think pt needs may need an apt -- pls review chart and do as accepted   Thanks

## 2017-05-23 ENCOUNTER — APPOINTMENT (OUTPATIENT)
Dept: HEMATOLOGY/ONCOLOGY | Facility: HOSPITAL | Age: 52
End: 2017-05-23
Attending: INTERNAL MEDICINE
Payer: MEDICARE

## 2017-05-26 ENCOUNTER — TELEPHONE (OUTPATIENT)
Dept: HEMATOLOGY/ONCOLOGY | Facility: HOSPITAL | Age: 52
End: 2017-05-26

## 2017-05-26 NOTE — TELEPHONE ENCOUNTER
Patients gleevec was approved but with a high co=pay I contacted [employer] Patient assistance foundation to get him free medication. He was enrolled in the quick start plan and will receive the 1st 30days asap.   I have left the patient 2 messages letting hi

## 2017-06-02 ENCOUNTER — TELEPHONE (OUTPATIENT)
Dept: HEMATOLOGY/ONCOLOGY | Facility: HOSPITAL | Age: 52
End: 2017-06-02

## 2017-06-02 NOTE — TELEPHONE ENCOUNTER
Called patient on both home and cell numbers, left message asking that he please call me back with an update regarding when he is going to receive his medication, and to schedule the education appt.

## 2017-06-10 ENCOUNTER — HOSPITAL ENCOUNTER (OUTPATIENT)
Dept: GENERAL RADIOLOGY | Facility: HOSPITAL | Age: 52
Discharge: HOME | End: 2017-06-10
Attending: INTERNAL MEDICINE
Payer: MEDICARE

## 2017-06-10 ENCOUNTER — OFFICE VISIT (OUTPATIENT)
Dept: INTERNAL MEDICINE CLINIC | Facility: CLINIC | Age: 52
End: 2017-06-10

## 2017-06-10 VITALS
TEMPERATURE: 99 F | BODY MASS INDEX: 44.1 KG/M2 | WEIGHT: 315 LBS | RESPIRATION RATE: 18 BRPM | HEART RATE: 99 BPM | HEIGHT: 71 IN | DIASTOLIC BLOOD PRESSURE: 73 MMHG | SYSTOLIC BLOOD PRESSURE: 125 MMHG

## 2017-06-10 DIAGNOSIS — R05.9 COUGH IN ADULT: Primary | ICD-10-CM

## 2017-06-10 DIAGNOSIS — K21.9 GASTROESOPHAGEAL REFLUX DISEASE, ESOPHAGITIS PRESENCE NOT SPECIFIED: ICD-10-CM

## 2017-06-10 DIAGNOSIS — R05.9 COUGH IN ADULT: ICD-10-CM

## 2017-06-10 DIAGNOSIS — K91.1 DUMPING SYNDROME: ICD-10-CM

## 2017-06-10 PROCEDURE — G0463 HOSPITAL OUTPT CLINIC VISIT: HCPCS | Performed by: INTERNAL MEDICINE

## 2017-06-10 PROCEDURE — 71020 XR CHEST PA + LAT CHEST (CPT=71020): CPT | Performed by: INTERNAL MEDICINE

## 2017-06-10 PROCEDURE — 99214 OFFICE O/P EST MOD 30 MIN: CPT | Performed by: INTERNAL MEDICINE

## 2017-06-10 NOTE — PROGRESS NOTES
HPI:    Patient ID: Rob Guerrero is a 47year old male. Pt sts he take pantoprazole after he eats. Reports nocturnal cough. Denies hx of acid reflux. Cough  This is a new problem. The current episode started more than 1 month ago.  The problem ha Benjy Topton Sister      FIBROMIALGIA        Smoking Status: Never Smoker                      Smokeless Status: Never Used                        Alcohol Use: Yes           0.0 oz/week       0 Standard drinks or equivalent per week       Comment: occasionally Pulmonary/Chest: Effort normal. No respiratory distress. Abdominal: Soft. Bowel sounds are normal. He exhibits no distension and no mass. There is no tenderness. There is no rebound and no guarding. Musculoskeletal: Normal range of motion.    Neurolog the services described in this documentation. All medical record entries made by the scribe were at my direction and in my presence.   I have reviewed the chart and discharge instructions (if applicable) and agree that the record reflects my personal perfor

## 2017-06-13 ENCOUNTER — TELEPHONE (OUTPATIENT)
Dept: HEMATOLOGY/ONCOLOGY | Facility: HOSPITAL | Age: 52
End: 2017-06-13

## 2017-06-13 NOTE — TELEPHONE ENCOUNTER
Called patient on both home and cell numbers, asked him to please call me to provide update regarding medications and to schedule a teaching appt.

## 2017-06-26 ENCOUNTER — TELEPHONE (OUTPATIENT)
Dept: HEMATOLOGY/ONCOLOGY | Facility: HOSPITAL | Age: 52
End: 2017-06-26

## 2017-06-26 NOTE — TELEPHONE ENCOUNTER
LM on both home and cell numbers asking patient to call back to schedule his follow up and provide an update.

## 2017-06-28 ENCOUNTER — NURSE NAVIGATOR ENCOUNTER (OUTPATIENT)
Dept: HEMATOLOGY/ONCOLOGY | Facility: HOSPITAL | Age: 52
End: 2017-06-28

## 2017-06-28 NOTE — PROGRESS NOTES
Message left on home and cell requesting call back regarding Gleevac delivery he received in June and if he was still considering treatment.   Requested Francisco J call back even if he had chosen to not pursue treatment so we could discuss proper disposal of the

## 2017-06-29 ENCOUNTER — NURSE NAVIGATOR ENCOUNTER (OUTPATIENT)
Dept: HEMATOLOGY/ONCOLOGY | Facility: HOSPITAL | Age: 52
End: 2017-06-29

## 2017-06-29 ENCOUNTER — TELEPHONE (OUTPATIENT)
Dept: HEMATOLOGY/ONCOLOGY | Facility: HOSPITAL | Age: 52
End: 2017-06-29

## 2017-06-29 NOTE — PROGRESS NOTES
NN was able to reach [facility] by phone today and scheduled chemo ed with Krystal Jackson on Wed July 5 at 11:00am.  [facility] confirmed he has the gleevac and will bring with to the appointment.     In order to continue to receive donated drug, [facility] needs to supply his mos

## 2017-06-29 NOTE — TELEPHONE ENCOUNTER
I called Mary Rosales and asked him to get his CBC and CMP done prior to [id number] appointment, he verbalized understanding, states he will get them done in next few days.

## 2017-07-02 ENCOUNTER — LAB ENCOUNTER (OUTPATIENT)
Dept: LAB | Facility: HOSPITAL | Age: 52
End: 2017-07-02
Attending: INTERNAL MEDICINE
Payer: MEDICARE

## 2017-07-02 DIAGNOSIS — C49.A2 GASTROINTESTINAL STROMAL TUMOR (GIST) OF STOMACH (HCC): ICD-10-CM

## 2017-07-02 DIAGNOSIS — I10 ESSENTIAL HYPERTENSION WITH GOAL BLOOD PRESSURE LESS THAN 140/90: ICD-10-CM

## 2017-07-02 DIAGNOSIS — E78.2 MIXED HYPERLIPIDEMIA: ICD-10-CM

## 2017-07-02 DIAGNOSIS — E11.9 TYPE 2 DIABETES MELLITUS WITHOUT COMPLICATION, WITHOUT LONG-TERM CURRENT USE OF INSULIN (HCC): ICD-10-CM

## 2017-07-02 DIAGNOSIS — Z12.5 SCREENING FOR PROSTATE CANCER: ICD-10-CM

## 2017-07-02 LAB
ALBUMIN SERPL BCP-MCNC: 3.9 G/DL (ref 3.5–4.8)
ALBUMIN/GLOB SERPL: 1.1 {RATIO} (ref 1–2)
ALP SERPL-CCNC: 85 U/L (ref 32–100)
ALT SERPL-CCNC: 25 U/L (ref 17–63)
ANION GAP SERPL CALC-SCNC: 10 MMOL/L (ref 0–18)
AST SERPL-CCNC: 21 U/L (ref 15–41)
BACTERIA UR QL AUTO: NEGATIVE /HPF
BASOPHILS # BLD: 0.1 K/UL (ref 0–0.2)
BASOPHILS NFR BLD: 1 %
BILIRUB SERPL-MCNC: 0.7 MG/DL (ref 0.3–1.2)
BILIRUB UR QL: NEGATIVE
BUN SERPL-MCNC: 12 MG/DL (ref 8–20)
BUN/CREAT SERPL: 10.3 (ref 10–20)
CALCIUM SERPL-MCNC: 9.5 MG/DL (ref 8.5–10.5)
CHLORIDE SERPL-SCNC: 102 MMOL/L (ref 95–110)
CHOLEST SERPL-MCNC: 194 MG/DL (ref 110–200)
CLARITY UR: CLEAR
CO2 SERPL-SCNC: 27 MMOL/L (ref 22–32)
COLOR UR: YELLOW
CREAT SERPL-MCNC: 1.17 MG/DL (ref 0.5–1.5)
CREAT UR-MCNC: 270.5 MG/DL
EOSINOPHIL # BLD: 0.1 K/UL (ref 0–0.7)
EOSINOPHIL NFR BLD: 2 %
ERYTHROCYTE [DISTWIDTH] IN BLOOD BY AUTOMATED COUNT: 15.8 % (ref 11–15)
GLOBULIN PLAS-MCNC: 3.7 G/DL (ref 2.5–3.7)
GLUCOSE SERPL-MCNC: 125 MG/DL (ref 70–99)
GLUCOSE UR-MCNC: NEGATIVE MG/DL
HCT VFR BLD AUTO: 39.2 % (ref 41–52)
HDLC SERPL-MCNC: 35 MG/DL
HGB BLD-MCNC: 12.8 G/DL (ref 13.5–17.5)
HGB UR QL STRIP.AUTO: NEGATIVE
KETONES UR-MCNC: NEGATIVE MG/DL
LDLC SERPL CALC-MCNC: 140 MG/DL (ref 0–99)
LEUKOCYTE ESTERASE UR QL STRIP.AUTO: NEGATIVE
LYMPHOCYTES # BLD: 1.4 K/UL (ref 1–4)
LYMPHOCYTES NFR BLD: 24 %
MCH RBC QN AUTO: 25.8 PG (ref 27–32)
MCHC RBC AUTO-ENTMCNC: 32.6 G/DL (ref 32–37)
MCV RBC AUTO: 79 FL (ref 80–100)
MICROALBUMIN UR-MCNC: 7.7 MG/DL (ref 0–1.8)
MICROALBUMIN/CREAT UR: 28.5 MG/G{CREAT} (ref 0–20)
MONOCYTES # BLD: 0.5 K/UL (ref 0–1)
MONOCYTES NFR BLD: 9 %
NEUTROPHILS # BLD AUTO: 3.8 K/UL (ref 1.8–7.7)
NEUTROPHILS NFR BLD: 65 %
NITRITE UR QL STRIP.AUTO: NEGATIVE
NONHDLC SERPL-MCNC: 159 MG/DL
OSMOLALITY UR CALC.SUM OF ELEC: 289 MOSM/KG (ref 275–295)
PH UR: 5 [PH] (ref 5–8)
PLATELET # BLD AUTO: 204 K/UL (ref 140–400)
PMV BLD AUTO: 8.4 FL (ref 7.4–10.3)
POTASSIUM SERPL-SCNC: 4.1 MMOL/L (ref 3.3–5.1)
PROT SERPL-MCNC: 7.6 G/DL (ref 5.9–8.4)
PROT UR-MCNC: 30 MG/DL
PSA SERPL-MCNC: 0.4 NG/ML (ref 0–4)
RBC # BLD AUTO: 4.97 M/UL (ref 4.5–5.9)
RBC #/AREA URNS AUTO: 1 /HPF
SODIUM SERPL-SCNC: 139 MMOL/L (ref 136–144)
SP GR UR STRIP: 1.02 (ref 1–1.03)
TRIGL SERPL-MCNC: 94 MG/DL (ref 1–149)
TSH SERPL-ACNC: 2.94 UIU/ML (ref 0.45–5.33)
UROBILINOGEN UR STRIP-ACNC: <2
VIT C UR-MCNC: NEGATIVE MG/DL
WBC # BLD AUTO: 5.8 K/UL (ref 4–11)
WBC #/AREA URNS AUTO: 2 /HPF

## 2017-07-02 PROCEDURE — 36415 COLL VENOUS BLD VENIPUNCTURE: CPT

## 2017-07-02 PROCEDURE — 82570 ASSAY OF URINE CREATININE: CPT

## 2017-07-02 PROCEDURE — 80053 COMPREHEN METABOLIC PANEL: CPT

## 2017-07-02 PROCEDURE — 82043 UR ALBUMIN QUANTITATIVE: CPT

## 2017-07-02 PROCEDURE — 84443 ASSAY THYROID STIM HORMONE: CPT

## 2017-07-02 PROCEDURE — 81001 URINALYSIS AUTO W/SCOPE: CPT

## 2017-07-02 PROCEDURE — 83036 HEMOGLOBIN GLYCOSYLATED A1C: CPT

## 2017-07-02 PROCEDURE — 80061 LIPID PANEL: CPT

## 2017-07-02 PROCEDURE — 85025 COMPLETE CBC W/AUTO DIFF WBC: CPT

## 2017-07-03 LAB — HBA1C MFR BLD: 7.2 % (ref 4–6)

## 2017-07-05 ENCOUNTER — NURSE ONLY (OUTPATIENT)
Dept: HEMATOLOGY/ONCOLOGY | Facility: HOSPITAL | Age: 52
End: 2017-07-05
Attending: INTERNAL MEDICINE
Payer: MEDICARE

## 2017-07-05 ENCOUNTER — SOCIAL WORK SERVICES (OUTPATIENT)
Dept: HEMATOLOGY/ONCOLOGY | Facility: HOSPITAL | Age: 52
End: 2017-07-05

## 2017-07-05 DIAGNOSIS — C49.A0 GASTROINTESTINAL STROMAL TUMOR (HCC): Primary | ICD-10-CM

## 2017-07-05 PROCEDURE — 99211 OFF/OP EST MAY X REQ PHY/QHP: CPT | Performed by: INTERNAL MEDICINE

## 2017-07-05 PROCEDURE — G0463 HOSPITAL OUTPT CLINIC VISIT: HCPCS | Performed by: INTERNAL MEDICINE

## 2017-07-05 NOTE — PATIENT INSTRUCTIONS
Medication Education Record: Oral Therapy    Learner:  Patient    Barriers / Limitations:  None    Diagnosis:   Gastrointestinal stromal tumor       Medication Name Dose/Strength Frequency   Imatinib  400mg  Daily                Schedule of oral medication meals  o Choose high calorie/high protein foods (chicken, hard cooked eggs, peanut butter, cheese)  o If nauseated, try dry foods, such as toast, crackers or pretzels; light or bland foods, such as applesauce or oatmeal.    Fluid intake:  o Drink 8-10 cups anti-nausea medications: Unable to drink for 24 hours or have signs of dehydration: tiredness, thirst, dry mouth, dark and decreased amount of urine  • Diarrhea – not controlled with Imodium AD or more than 6 episodes in 24 hours  • Constipation –no bowel administering the medication to protect against exposure. Discard used gloves immediately – do not use for anything else. 4. Wash hands thoroughly before and after contact with this medication.   5. Women of child bearing age, pregnant women or women who hands.  If the soiled garments cannot be washed right away, place them in a sealed plastic bag until they can be washed.    3. Absorbable undergarments, or any other items contaminated with chemotherapy, should be placed in a sealed plastic bag for disposal

## 2017-07-05 NOTE — PROGRESS NOTES
Cancer treatment education, including treatment plan, supportive medications, and post-treatment care, was provided to the patient.   The patient/support person was attentive during education, verbalized understanding, all questions were answered and patien

## 2017-07-05 NOTE — PROGRESS NOTES
SW met with patient per referral from [facility]. At present patient is applying for financial assistance at [address] [place] [address]. Patient expressing financial concerns. SW explore with patient transportation, food, utility resource options.  Patient express interest and a

## 2017-07-06 ENCOUNTER — SOCIAL WORK SERVICES (OUTPATIENT)
Dept: HEMATOLOGY/ONCOLOGY | Facility: HOSPITAL | Age: 52
End: 2017-07-06

## 2017-07-06 NOTE — PROGRESS NOTES
Email sent to patient. Please see below. Good Morning Boubacar Schmitz,   It was nice to meet you yesterday. Below you will find resource information we discussed in our meeting.  Please feel welcome to call or email me at any time should you have any questions

## 2017-07-13 ENCOUNTER — NURSE NAVIGATOR ENCOUNTER (OUTPATIENT)
Dept: HEMATOLOGY/ONCOLOGY | Facility: HOSPITAL | Age: 52
End: 2017-07-13

## 2017-07-13 NOTE — PROGRESS NOTES
Call placed to [facility] to update on [facility] Patient Assistance Application. I faxed Francisco J's application and provided financial documents to [facility] last week. Per phone call today, they are in need of additional financial documentation.   Informed Francisco J that

## 2017-07-17 RX ORDER — ATORVASTATIN CALCIUM 20 MG/1
20 TABLET, FILM COATED ORAL NIGHTLY
Qty: 90 TABLET | Refills: 3 | Status: SHIPPED | OUTPATIENT
Start: 2017-07-17 | End: 2018-05-08

## 2017-07-19 ENCOUNTER — TELEPHONE (OUTPATIENT)
Dept: HEMATOLOGY/ONCOLOGY | Facility: HOSPITAL | Age: 52
End: 2017-07-19

## 2017-07-19 NOTE — TELEPHONE ENCOUNTER
Called Francisco J to see how he is doing on the imatinib. He reports he is tolerating the medication well.  He said he is in the process of getting paperwork together regarding financial assistance, and will be e-mailing Shannon De Jesus some information about th

## 2017-07-20 ENCOUNTER — TELEPHONE (OUTPATIENT)
Dept: HEMATOLOGY/ONCOLOGY | Facility: HOSPITAL | Age: 52
End: 2017-07-20

## 2017-07-20 NOTE — TELEPHONE ENCOUNTER
Additional financial documentation faxed yesterday to [employer] with cover sheet and my contact information. Refaxed today with instruction to call me if they do not receive.

## 2017-07-20 NOTE — TELEPHONE ENCOUNTER
[employer] [facility] called and said they received 9 pages of documents from us. They were black and the bottom and not legible. If someone can please re fax that paper work to  [phone]. Patient ID # [id number].  So they can assist with

## 2017-07-25 ENCOUNTER — TELEPHONE (OUTPATIENT)
Dept: INTERNAL MEDICINE CLINIC | Facility: CLINIC | Age: 52
End: 2017-07-25

## 2017-07-31 ENCOUNTER — LAB ENCOUNTER (OUTPATIENT)
Dept: LAB | Facility: HOSPITAL | Age: 52
End: 2017-07-31
Attending: INTERNAL MEDICINE
Payer: MEDICARE

## 2017-07-31 DIAGNOSIS — C49.A0 GASTROINTESTINAL STROMAL TUMOR (HCC): ICD-10-CM

## 2017-07-31 LAB
ALBUMIN SERPL BCP-MCNC: 3.7 G/DL (ref 3.5–4.8)
ALBUMIN/GLOB SERPL: 1.1 {RATIO} (ref 1–2)
ALP SERPL-CCNC: 83 U/L (ref 32–100)
ALT SERPL-CCNC: 27 U/L (ref 17–63)
ANION GAP SERPL CALC-SCNC: 6 MMOL/L (ref 0–18)
AST SERPL-CCNC: 23 U/L (ref 15–41)
BASOPHILS # BLD: 0.1 K/UL (ref 0–0.2)
BASOPHILS NFR BLD: 1 %
BILIRUB SERPL-MCNC: 0.5 MG/DL (ref 0.3–1.2)
BUN SERPL-MCNC: 12 MG/DL (ref 8–20)
BUN/CREAT SERPL: 9.1 (ref 10–20)
CALCIUM SERPL-MCNC: 9.2 MG/DL (ref 8.5–10.5)
CHLORIDE SERPL-SCNC: 103 MMOL/L (ref 95–110)
CO2 SERPL-SCNC: 28 MMOL/L (ref 22–32)
CREAT SERPL-MCNC: 1.32 MG/DL (ref 0.5–1.5)
EOSINOPHIL # BLD: 0.1 K/UL (ref 0–0.7)
EOSINOPHIL NFR BLD: 2 %
ERYTHROCYTE [DISTWIDTH] IN BLOOD BY AUTOMATED COUNT: 17.3 % (ref 11–15)
GLOBULIN PLAS-MCNC: 3.5 G/DL (ref 2.5–3.7)
GLUCOSE SERPL-MCNC: 131 MG/DL (ref 70–99)
HCT VFR BLD AUTO: 40.3 % (ref 41–52)
HGB BLD-MCNC: 13.2 G/DL (ref 13.5–17.5)
LYMPHOCYTES # BLD: 1.3 K/UL (ref 1–4)
LYMPHOCYTES NFR BLD: 23 %
MCH RBC QN AUTO: 26 PG (ref 27–32)
MCHC RBC AUTO-ENTMCNC: 32.8 G/DL (ref 32–37)
MCV RBC AUTO: 79.2 FL (ref 80–100)
MONOCYTES # BLD: 0.5 K/UL (ref 0–1)
MONOCYTES NFR BLD: 8 %
NEUTROPHILS # BLD AUTO: 3.8 K/UL (ref 1.8–7.7)
NEUTROPHILS NFR BLD: 66 %
OSMOLALITY UR CALC.SUM OF ELEC: 286 MOSM/KG (ref 275–295)
PLATELET # BLD AUTO: 223 K/UL (ref 140–400)
PMV BLD AUTO: 8 FL (ref 7.4–10.3)
POTASSIUM SERPL-SCNC: 3.9 MMOL/L (ref 3.3–5.1)
PROT SERPL-MCNC: 7.2 G/DL (ref 5.9–8.4)
RBC # BLD AUTO: 5.09 M/UL (ref 4.5–5.9)
SODIUM SERPL-SCNC: 137 MMOL/L (ref 136–144)
WBC # BLD AUTO: 5.8 K/UL (ref 4–11)

## 2017-07-31 PROCEDURE — 36415 COLL VENOUS BLD VENIPUNCTURE: CPT

## 2017-07-31 PROCEDURE — 85025 COMPLETE CBC W/AUTO DIFF WBC: CPT

## 2017-07-31 PROCEDURE — 80053 COMPREHEN METABOLIC PANEL: CPT

## 2017-08-02 ENCOUNTER — OFFICE VISIT (OUTPATIENT)
Dept: HEMATOLOGY/ONCOLOGY | Facility: HOSPITAL | Age: 52
End: 2017-08-02
Attending: INTERNAL MEDICINE
Payer: MEDICARE

## 2017-08-02 VITALS
BODY MASS INDEX: 44.1 KG/M2 | RESPIRATION RATE: 20 BRPM | HEIGHT: 71 IN | TEMPERATURE: 99 F | WEIGHT: 315 LBS | HEART RATE: 93 BPM | SYSTOLIC BLOOD PRESSURE: 130 MMHG | DIASTOLIC BLOOD PRESSURE: 81 MMHG

## 2017-08-02 DIAGNOSIS — Z51.11 ENCOUNTER FOR CHEMOTHERAPY MANAGEMENT: ICD-10-CM

## 2017-08-02 DIAGNOSIS — C49.A2 GASTROINTESTINAL STROMAL TUMOR (GIST) OF STOMACH (HCC): Primary | ICD-10-CM

## 2017-08-02 DIAGNOSIS — R60.0 EDEMA, LOWER EXTREMITY: ICD-10-CM

## 2017-08-02 DIAGNOSIS — R19.7 DIARRHEA, UNSPECIFIED TYPE: ICD-10-CM

## 2017-08-02 PROCEDURE — G0463 HOSPITAL OUTPT CLINIC VISIT: HCPCS | Performed by: INTERNAL MEDICINE

## 2017-08-02 PROCEDURE — 99215 OFFICE O/P EST HI 40 MIN: CPT | Performed by: INTERNAL MEDICINE

## 2017-08-02 NOTE — PROGRESS NOTES
Cancer Center Progress Note    Patient Name: Angeles Santana   YOB: 1965   Medical Record Number: [id number]   Attending Physician: Jordan Mcgee M.D.        Chief Complaint:  GIST of the stomach    History of Present Illness:  Cancer history: Comment: LYMPH NODE BIOPSY NOSE  No date: UPPER GI ENDOSCOPY,EXAM    Family History:  Family History   Problem Relation Age of Onset   • Alcohol and Other Disorders Associated Father    • Stroke Mother    • Psychiatric Mother      dementia   • Diabetes S Besylate 5 MG Oral Tab, Take 1 tablet (5 mg total) by mouth daily. , Disp: 30 tablet, Rfl: 6  •  Glucose Blood (FREESTYLE TEST) In Vitro Strip, Test blood sugar twice daily as directed, Disp: 100 strip, Rfl: 3  •  docusate sodium 100 MG Oral Cap, Take 1 cap patient. Impression and Plan:  50-year-old male with gastrointestinal stromal tumor of the stomach status robotic partial gastrectomy with Dr. Vargas Muniz 4/4/17 with a tumor measuring 7 cm in greatest dimension with 6 mitoses per high-power field.   –His jourdan

## 2017-08-09 ENCOUNTER — TELEPHONE (OUTPATIENT)
Dept: HEMATOLOGY/ONCOLOGY | Facility: HOSPITAL | Age: 52
End: 2017-08-09

## 2017-08-09 NOTE — TELEPHONE ENCOUNTER
Called patient to see how he is feeling since he has been holding his imatinib, no answer, no machine. Will try again later.

## 2017-08-10 NOTE — TELEPHONE ENCOUNTER
Called Fam Valdez- his imatinib has been on hold since 8/2. He reports that within 2 days his \"grogginess\" completely resolved and he feels much better. He also says that his lower extremity edema has \"improved\" but has not completely resolved.   His diarrh

## 2017-08-10 NOTE — TELEPHONE ENCOUNTER
Jamarcus Berumen called back. I let him know that Dr Juan Pablo Morris would like him to continue to hold the imatinib and return for a f/u in a couple weeks. Appt scheduled for 8/25. Pt verbalized understanding.

## 2017-08-10 NOTE — TELEPHONE ENCOUNTER
I updated Dr David Gutierrez on pt's improved symptoms. He would like to see patient back in clinic for a follow up visit in a couple of weeks. LM on pt's VM asking for call back to schedule f/u visit.

## 2017-08-20 ENCOUNTER — HOSPITAL ENCOUNTER (OUTPATIENT)
Dept: CT IMAGING | Facility: HOSPITAL | Age: 52
Discharge: HOME | End: 2017-08-20
Attending: INTERNAL MEDICINE
Payer: MEDICARE

## 2017-08-20 DIAGNOSIS — C49.A2 GASTROINTESTINAL STROMAL TUMOR (GIST) OF STOMACH (HCC): ICD-10-CM

## 2017-08-20 PROCEDURE — 74177 CT ABD & PELVIS W/CONTRAST: CPT | Performed by: INTERNAL MEDICINE

## 2017-08-20 PROCEDURE — 71260 CT THORAX DX C+: CPT | Performed by: INTERNAL MEDICINE

## 2017-08-22 ENCOUNTER — MED REC SCAN ONLY (OUTPATIENT)
Dept: INTERNAL MEDICINE CLINIC | Facility: CLINIC | Age: 52
End: 2017-08-22

## 2017-08-25 ENCOUNTER — OFFICE VISIT (OUTPATIENT)
Dept: HEMATOLOGY/ONCOLOGY | Facility: HOSPITAL | Age: 52
End: 2017-08-25
Attending: INTERNAL MEDICINE
Payer: MEDICARE

## 2017-08-25 VITALS
WEIGHT: 315 LBS | RESPIRATION RATE: 20 BRPM | DIASTOLIC BLOOD PRESSURE: 81 MMHG | HEART RATE: 92 BPM | TEMPERATURE: 99 F | BODY MASS INDEX: 44.1 KG/M2 | SYSTOLIC BLOOD PRESSURE: 152 MMHG | HEIGHT: 71 IN

## 2017-08-25 DIAGNOSIS — R19.7 DIARRHEA, UNSPECIFIED TYPE: ICD-10-CM

## 2017-08-25 DIAGNOSIS — R41.0 CONFUSION: ICD-10-CM

## 2017-08-25 DIAGNOSIS — Z51.11 ENCOUNTER FOR CHEMOTHERAPY MANAGEMENT: ICD-10-CM

## 2017-08-25 DIAGNOSIS — R60.0 EDEMA, LOWER EXTREMITY: ICD-10-CM

## 2017-08-25 DIAGNOSIS — C49.A2 GASTROINTESTINAL STROMAL TUMOR (GIST) OF STOMACH (HCC): Primary | ICD-10-CM

## 2017-08-25 PROCEDURE — G0463 HOSPITAL OUTPT CLINIC VISIT: HCPCS | Performed by: INTERNAL MEDICINE

## 2017-08-25 PROCEDURE — 99215 OFFICE O/P EST HI 40 MIN: CPT | Performed by: INTERNAL MEDICINE

## 2017-08-25 NOTE — PROGRESS NOTES
Cancer Center Progress Note    Patient Name: Sandy Saeed   YOB: 1965   Medical Record Number: [id number]   Attending Physician: Jagruti Monson M.D.        Chief Complaint:  GIST of the stomach    History of Present Illness:  Cancer history: History:  Family History   Problem Relation Age of Onset   • Alcohol and Other Disorders Associated Father    • Stroke Mother    • Psychiatric Mother      dementia   • Diabetes Sister    • Obesity Sister    • Cancer Maternal Grandfather    • Other [Other] Tab, Take 1 tablet by mouth daily. , Disp: 30 tablet, Rfl: 6  •  AmLODIPine Besylate 5 MG Oral Tab, Take 1 tablet (5 mg total) by mouth daily. , Disp: 30 tablet, Rfl: 6  •  Glucose Blood (FREESTYLE TEST) In Vitro Strip, Test blood sugar twice daily as direct Plan:  13-year-old male with gastrointestinal stromal tumor of the stomach status robotic partial gastrectomy with Dr. Sahra Junior 4/4/17 with a tumor measuring 7 cm in greatest dimension with 6 mitoses per high-power field.   –His tumor is high risk based on si

## 2017-09-12 ENCOUNTER — TELEPHONE (OUTPATIENT)
Dept: HEMATOLOGY/ONCOLOGY | Facility: HOSPITAL | Age: 52
End: 2017-09-12

## 2017-09-12 NOTE — TELEPHONE ENCOUNTER
Called patient to see how he is doing on his medication, LM asking him to call back and provide an update.

## 2017-09-14 ENCOUNTER — TELEPHONE (OUTPATIENT)
Dept: HEMATOLOGY/ONCOLOGY | Facility: HOSPITAL | Age: 52
End: 2017-09-14

## 2017-09-28 RX ORDER — AMLODIPINE BESYLATE 5 MG/1
TABLET ORAL
Qty: 90 TABLET | Refills: 0 | Status: SHIPPED | OUTPATIENT
Start: 2017-09-28 | End: 2017-12-13

## 2017-09-28 NOTE — TELEPHONE ENCOUNTER
Has upcoming appt. Chart reviewed. Refills sent per Triage Dept protocol.      Hypertensive Medications  Protocol Criteria:  · Appointment scheduled in the past 6 months or in the next 3 months  · BMP or CMP in the past 12 months  · Creatinine result < 2  R

## 2017-10-03 RX ORDER — LISINOPRIL AND HYDROCHLOROTHIAZIDE 25; 20 MG/1; MG/1
1 TABLET ORAL DAILY
Qty: 30 TABLET | Refills: 6
Start: 2017-10-03

## 2017-10-06 ENCOUNTER — APPOINTMENT (OUTPATIENT)
Dept: HEMATOLOGY/ONCOLOGY | Facility: HOSPITAL | Age: 52
End: 2017-10-06
Attending: INTERNAL MEDICINE
Payer: MEDICARE

## 2017-10-06 RX ORDER — LISINOPRIL AND HYDROCHLOROTHIAZIDE 25; 20 MG/1; MG/1
1 TABLET ORAL DAILY
Qty: 30 TABLET | Refills: 0 | Status: SHIPPED
Start: 2017-10-06 | End: 2017-11-05

## 2017-10-06 NOTE — TELEPHONE ENCOUNTER
30 day supply issued per protocol.  Due for office visit      Diabetes Medications  Protocol Criteria:  · Appointment scheduled in the past 6 months or the next 3 months  · A1C < 7.5 in the past 6 months  · Creatinine in the past 12 months  · Creatinine res Chichi Luna MD    Office Visit    5 months ago Gastrointestinal stromal tumor (GIST) of stomach [facility] Hematology Oncology Magy Ewing MD    Office Visit        Future Appointments       Provider Department Appt Notes    In 1 month Maria Elena Omalley

## 2017-10-06 NOTE — TELEPHONE ENCOUNTER
From: Marquis Sosa  Sent: 10/3/2017 9:18 AM CDT  Subject: Medication Renewal Request    Marquis Sosa would like a refill of the following medications:     Lisinopril-Hydrochlorothiazide 20-25 MG Oral Tab [Aidan Schultz MD]     METFORMIN HCL 50

## 2017-11-02 ENCOUNTER — TELEPHONE (OUTPATIENT)
Dept: INTERNAL MEDICINE CLINIC | Facility: CLINIC | Age: 52
End: 2017-11-02

## 2017-11-06 RX ORDER — LISINOPRIL AND HYDROCHLOROTHIAZIDE 25; 20 MG/1; MG/1
1 TABLET ORAL DAILY
Qty: 30 TABLET | Refills: 0 | Status: SHIPPED | OUTPATIENT
Start: 2017-11-06 | End: 2017-12-13

## 2017-11-06 NOTE — TELEPHONE ENCOUNTER
Diabetes Medications  Protocol Criteria:  · Appointment scheduled in the past 6 months or the next 3 months  · A1C < 7.5 in the past 6 months  · Creatinine in the past 12 months  · Creatinine result < 1.5   Recent Outpatient Visits            2 months ago tumor (GIST) of stomach [facility])    [facility] Hematology Oncology Virgil Rico MD    Office Visit        Future Appointments       Provider Department Appt Notes    In 2 months Eugenia Penny MD [facility], [address] [place] [address]

## 2017-11-06 NOTE — TELEPHONE ENCOUNTER
Signed Prescriptions Disp Refills    METFORMIN  MG Oral Tab 60 tablet 0      Sig: TAKE 1 TABLET(500 MG) BY MOUTH TWICE DAILY WITH MEALS        Authorizing Provider: FRANK Nugent        Ordering User: Marina Benitez      LISINOPRIL-HY

## 2017-11-29 ENCOUNTER — NURSE TRIAGE (OUTPATIENT)
Dept: OTHER | Age: 52
End: 2017-11-29

## 2017-11-29 ENCOUNTER — HOSPITAL ENCOUNTER (INPATIENT)
Facility: HOSPITAL | Age: 52
LOS: 1 days | Discharge: HOME | DRG: 638 | End: 2017-12-01
Attending: EMERGENCY MEDICINE | Admitting: HOSPITALIST
Payer: MEDICARE

## 2017-11-29 DIAGNOSIS — E11.00 UNCONTROLLED TYPE 2 DIABETES MELLITUS WITH HYPEROSMOLAR NONKETOTIC HYPERGLYCEMIA (HCC): Primary | ICD-10-CM

## 2017-11-29 PROCEDURE — 99223 1ST HOSP IP/OBS HIGH 75: CPT | Performed by: HOSPITALIST

## 2017-11-29 RX ORDER — SODIUM CHLORIDE 9 MG/ML
125 INJECTION, SOLUTION INTRAVENOUS CONTINUOUS
Status: DISCONTINUED | OUTPATIENT
Start: 2017-11-29 | End: 2017-11-30

## 2017-11-29 NOTE — TELEPHONE ENCOUNTER
Action Requested: Summary for Provider     []  Critical Lab, Recommendations Needed  [] Need Additional Advice  [x]   FYI    []   Need Orders  [] Need Medications Sent to Pharmacy  []  Other     SUMMARY:pt called is a NIDDM, on metformin 500mg 2 qam and 1

## 2017-11-30 PROCEDURE — 99223 1ST HOSP IP/OBS HIGH 75: CPT | Performed by: INTERNAL MEDICINE

## 2017-11-30 PROCEDURE — 99233 SBSQ HOSP IP/OBS HIGH 50: CPT | Performed by: HOSPITALIST

## 2017-11-30 PROCEDURE — 99222 1ST HOSP IP/OBS MODERATE 55: CPT | Performed by: INTERNAL MEDICINE

## 2017-11-30 RX ORDER — HEPARIN SODIUM 5000 [USP'U]/ML
5000 INJECTION, SOLUTION INTRAVENOUS; SUBCUTANEOUS EVERY 8 HOURS SCHEDULED
Status: DISCONTINUED | OUTPATIENT
Start: 2017-11-30 | End: 2017-12-01

## 2017-11-30 RX ORDER — ACETAMINOPHEN 325 MG/1
650 TABLET ORAL EVERY 6 HOURS PRN
Status: DISCONTINUED | OUTPATIENT
Start: 2017-11-30 | End: 2017-12-01

## 2017-11-30 RX ORDER — ONDANSETRON 2 MG/ML
4 INJECTION INTRAMUSCULAR; INTRAVENOUS EVERY 6 HOURS PRN
Status: DISCONTINUED | OUTPATIENT
Start: 2017-11-30 | End: 2017-12-01

## 2017-11-30 RX ORDER — DEXTROSE AND SODIUM CHLORIDE 5; .45 G/100ML; G/100ML
INJECTION, SOLUTION INTRAVENOUS AS NEEDED
Status: DISCONTINUED | OUTPATIENT
Start: 2017-11-30 | End: 2017-11-30

## 2017-11-30 RX ORDER — POTASSIUM CHLORIDE 20 MEQ/1
40 TABLET, EXTENDED RELEASE ORAL EVERY 4 HOURS
Status: COMPLETED | OUTPATIENT
Start: 2017-11-30 | End: 2017-11-30

## 2017-11-30 RX ORDER — SODIUM CHLORIDE 0.9 % (FLUSH) 0.9 %
3 SYRINGE (ML) INJECTION AS NEEDED
Status: DISCONTINUED | OUTPATIENT
Start: 2017-11-30 | End: 2017-12-01

## 2017-11-30 RX ORDER — SODIUM CHLORIDE 9 MG/ML
INJECTION, SOLUTION INTRAVENOUS CONTINUOUS
Status: DISCONTINUED | OUTPATIENT
Start: 2017-11-30 | End: 2017-12-01

## 2017-11-30 NOTE — PROGRESS NOTES
[place] [facility]  Hospitalist Progress  Note     Chastity Slim Patient Status:  Inpatient    1965  47year old CSN [id number]   Location -A Attending Mel Claudio MD   Hosp Day # 0 PCP Octavio Holt MD     ASSESSMENT/PLAN 37.7*   MCV  82.7  81.1   MCH  27.9  27.8   MCHC  33.7  34.3   RDW  15.6*  15.3*   WBC  6.9  6.3   PLT  165  137*     Recent Labs   Lab  11/29/17   1834  11/30/17   0446   GLU  668*  255*   BUN  21*  18   CREATSERUM  1.48  1.12   GFRAA  60  >60   GFRNAA  5

## 2017-11-30 NOTE — DIABETES ED
[facility]    Diabetes Education  Note    [facility] Patient Status:  Inpatient   1965 MRN [id number]  Location [facility] 2W/ Attending Yury Garcia MD  Hosp Day # 0 PCP Lang Bustillos MD    Reason for Visit: at discharge.  Insurance formulary prefers:  · Levemir FlexTouch insulin pen  · Humalog KwikPen for rapid acting insulin  · Insulin pen needles 4mm x 32G  · Contour Next test strips  · Microlet lancets    Education Provided:    · How to inject insulin using

## 2017-11-30 NOTE — PLAN OF CARE
Problem: Diabetes/Glucose Control  Goal: Glucose maintained within prescribed range  INTERVENTIONS:  - Monitor Blood Glucose as ordered  - Assess for signs and symptoms of hyperglycemia and hypoglycemia  - Administer ordered medications to maintain glucose restrictions as appropriate  Outcome: Progressing  Potassium level replaced today per protocol

## 2017-11-30 NOTE — CONSULTS
[facility]    Report of Consultation    Anali Ceja Patient Status:  Inpatient    1965 MRN [id number]   Location [facility] 2W/SW Attending Annabella Thakkar MD   Hosp Day # 0 PCP Janine Munoz MD     Date of Admi Stroke Mother    • Psychiatric Mother      dementia   • Diabetes Sister    • Obesity Sister    • Cancer Maternal Grandfather    • Other [Other] [OTHER] Sister      FIBROMIALGIA   • Glaucoma Neg       reports that he has never smoked.  He has never used smok 11/30/17   0446   GLU  668*  255*   BUN  21*  18   CREATSERUM  1.48  1.12   GFRAA  60  >60   GFRNAA  50*  >60   CA  9.4  8.7   NA  129*  132*   K  4.4  3.1*   CL  89*  97   CO2  26  25         Impression and Plan:  Patient Active Problem List:     Morbid o

## 2017-11-30 NOTE — ED PROVIDER NOTES
Patient Seen in: [facility] AND [facility] Emergency Department    History   Patient presents with:  Hyperglycemia (metabolic)    Stated Complaint: Pt states \"I think I'm having Ketoacidosis\".      HPI    23-year-old male with history of diabetes, hypertension, stated complaint: Pt states \"I think I'm having Ketoacidosis\". Other systems are as noted in HPI. Constitutional and vital signs reviewed. All other systems reviewed and negative except as noted above.     Physical Exam   ED Triage Vitals  BP: 134 - Abnormal; Notable for the following:     Acetone Moderate (*)     All other components within normal limits   POCT GLUCOSE - Abnormal; Notable for the following:     POC Glucose  >500 (*)     All other components within normal limits   CBC W/ DIFFERENTIA encounter diagnosis)    Disposition:  Admit  11/29/2017  8:30 pm    Follow-up:  No follow-up provider specified.       Medications Prescribed:  Current Discharge Medication List        Present on Admission  Date Reviewed: 8/25/2017          ICD-10-CM Noted

## 2017-11-30 NOTE — H&P
[facility]    PATIENT'S NAME: Jarrell Osorio PHYSICIAN: Christa Bonilla MD   PATIENT ACCOUNT#:   [id number]    LOCATION:  [place] [address]  MEDICAL RECORD #:   [id number]       YOB: 1965  ADMISSION DATE:       11/29/ EXAMINATION:    GENERAL:  Alert and oriented to time, place, and person. Moderate distress. VITAL SIGNS:  Temperature 96.4, pulse 91, respiratory rate 18, blood pressure 112/56, pulse 94% on room air. HEENT:  Atraumatic.   Oropharynx clear with dry mucou

## 2017-11-30 NOTE — RESPIRATORY THERAPY NOTE
CPAP/BIPAP eval done with patient at 0220. Patient states he's due for an updated sleep study due to insurance changes and also no longer has home unit. He was using the cpap at home prior to this issue,but isn't certain about the settings.  Will place becca

## 2017-12-01 ENCOUNTER — TELEPHONE (OUTPATIENT)
Dept: CASE MANAGEMENT | Age: 52
End: 2017-12-01

## 2017-12-01 ENCOUNTER — APPOINTMENT (OUTPATIENT)
Dept: CT IMAGING | Facility: HOSPITAL | Age: 52
DRG: 638 | End: 2017-12-01
Attending: INTERNAL MEDICINE
Payer: MEDICARE

## 2017-12-01 VITALS
RESPIRATION RATE: 19 BRPM | OXYGEN SATURATION: 97 % | TEMPERATURE: 98 F | HEART RATE: 89 BPM | WEIGHT: 315 LBS | BODY MASS INDEX: 45.1 KG/M2 | DIASTOLIC BLOOD PRESSURE: 69 MMHG | HEIGHT: 70 IN | SYSTOLIC BLOOD PRESSURE: 102 MMHG

## 2017-12-01 DIAGNOSIS — E11.00 UNCONTROLLED TYPE 2 DIABETES MELLITUS WITH HYPEROSMOLAR NONKETOTIC HYPERGLYCEMIA (HCC): Primary | ICD-10-CM

## 2017-12-01 PROCEDURE — 99239 HOSP IP/OBS DSCHRG MGMT >30: CPT | Performed by: HOSPITALIST

## 2017-12-01 PROCEDURE — 74177 CT ABD & PELVIS W/CONTRAST: CPT | Performed by: INTERNAL MEDICINE

## 2017-12-01 PROCEDURE — 99232 SBSQ HOSP IP/OBS MODERATE 35: CPT | Performed by: INTERNAL MEDICINE

## 2017-12-01 RX ORDER — POTASSIUM CHLORIDE 20 MEQ/1
40 TABLET, EXTENDED RELEASE ORAL ONCE
Status: COMPLETED | OUTPATIENT
Start: 2017-12-01 | End: 2017-12-01

## 2017-12-01 RX ORDER — BLOOD-GLUCOSE METER
1 KIT MISCELLANEOUS AS NEEDED
Qty: 1 KIT | Refills: 0 | Status: SHIPPED | OUTPATIENT
Start: 2017-12-01 | End: 2018-12-11

## 2017-12-01 RX ORDER — DEXTROSE MONOHYDRATE 25 G/50ML
50 INJECTION, SOLUTION INTRAVENOUS AS NEEDED
Status: DISCONTINUED | OUTPATIENT
Start: 2017-12-01 | End: 2017-12-01

## 2017-12-01 RX ORDER — SYRING-NEEDL,DISP,INSUL,0.3 ML 30 GX5/16"
SYRINGE, EMPTY DISPOSABLE MISCELLANEOUS
Qty: 120 EACH | Refills: 0 | Status: SHIPPED | OUTPATIENT
Start: 2017-12-01

## 2017-12-01 NOTE — DIABETES ED
[facility]    Diabetes Education  Note    Sam Hightower Patient Status:  Inpatient   1965 MRN [id number]  Location [facility] 2W/SW Attending Jannie Weiss Day # 1 PCP Kaykay Smith MD    Reason for Visi

## 2017-12-01 NOTE — PLAN OF CARE
Problem: Diabetes/Glucose Control  Goal: Glucose maintained within prescribed range  INTERVENTIONS:  - Monitor Blood Glucose as ordered  - Assess for signs and symptoms of hyperglycemia and hypoglycemia  - Administer ordered medications to maintain glucose Morning accucheck discussed with Dr. Francisco Lopez, placed on High TDD sliding scale. K+ oral replacement given per protocol. CT abdomen completed. Awaiting Dr. Golden Olguin to see patient for discharge orders. Ok to discharge per Dr. Jacquelin Michelle.

## 2017-12-01 NOTE — TELEPHONE ENCOUNTER
[facility] is asking for referral for pt for DM education. He is already scheduled for appt in month of Dec with them.

## 2017-12-01 NOTE — DISCHARGE SUMMARY
[facility] [facility]    Discharge Summary    Rey Bryant Patient Status:  Inpatient    1965 MRN [id number]   Location [facility] 2W/SW Attending Lor Plummer, [address] [facility] Day # 1 PCP Nova Sandoval MD     Date of Admissio Admission:     Physical Exam:   General appearance: alert, appears stated age and cooperative  Pulmonary:  clear to auscultation bilaterally  Cardiovascular: S1, S2 normal, no murmur, click, rub or gallop, regular rate and rhythm  Abdominal: soft, non-tend these medications      Instructions Prescription details   FREESTYLE SYSTEM Kit      1 each by Does not apply route as needed for Other.    Quantity:  1 kit  Refills:  0     Insulin Aspart Pen 100 UNIT/ML Sopn  Commonly known as:  NOVOLOG  Notes to patient: DAILY   Quantity:  90 tablet  Refills:  0     atorvastatin 20 MG Tabs  Commonly known as:  LIPITOR      Take 1 tablet (20 mg total) by mouth nightly.    Quantity:  90 tablet  Refills:  3     docusate sodium 100 MG Caps  Commonly known as:  COLACE      Take min

## 2017-12-01 NOTE — CONSULTS
[facility]    PATIENT'S NAME: Corrina Beckford   ATTENDING PHYSICIAN: Mendez Agustin MD   CONSULTING PHYSICIAN: Prince Fairchild.  David Gutierrez MD   PATIENT ACCOUNT#:   [id number]    LOCATION:  [address] [facility] [address] #:   [id number]       DATE OF BIRTH:  [birth year] performance status is 0. Temperature 36.4, pulse 91, respiratory rate 26, blood pressure 119/66, pulse ox 93% on room air. Weight is 163 kg. HEENT:  Moist mucous membranes. Oropharynx clear. NECK:  Supple.   LUNGS:  Symmetric expansion, nonlabored chon

## 2017-12-01 NOTE — CM/SW NOTE
MAXIMO met with the patient at bedside. He lives at home with his wife in a house with a few stairs. He has a cane at home and is independent with his care.   Patient reports that he has no been testing his sugars d/t his testing strips not being covered by h

## 2017-12-01 NOTE — PROGRESS NOTES
[place] [facility]    Progress Note    Lex Jasso Patient Status:  Inpatient    1965 MRN [id number]   Location [facility] 2W/SW Attending Alex Mendez, [address] [facility] Day # 1 PCP William Light MD     Subjective:  Feels b Syndrome  Endocrinology is following for inpatient DM management.      PLAN:  - Levemir 50 am and 80 --> 85 pm  - Novolog 24 --> 28 units SQ TID with meals  - High Dose CF with meals  - Accuchecks AC and HS  - Hypoglycemia protocol  - Diabetic diet    Epifanio

## 2017-12-02 ENCOUNTER — TELEPHONE (OUTPATIENT)
Dept: MEDSURG UNIT | Facility: HOSPITAL | Age: 52
End: 2017-12-02

## 2017-12-04 ENCOUNTER — TELEPHONE (OUTPATIENT)
Dept: HEMATOLOGY/ONCOLOGY | Facility: HOSPITAL | Age: 52
End: 2017-12-04

## 2017-12-04 ENCOUNTER — TELEPHONE (OUTPATIENT)
Dept: INTERNAL MEDICINE UNIT | Facility: HOSPITAL | Age: 52
End: 2017-12-04

## 2017-12-04 NOTE — TELEPHONE ENCOUNTER
Called patient to schedule his follow up visit with Dr Elise Pineda, he said that transportation is very difficult for him and he would need to check his schedule to see when he is able to come in, and that \"he will call us back\"

## 2017-12-11 ENCOUNTER — OFFICE VISIT (OUTPATIENT)
Dept: INTERNAL MEDICINE CLINIC | Facility: CLINIC | Age: 52
End: 2017-12-11

## 2017-12-11 VITALS
SYSTOLIC BLOOD PRESSURE: 138 MMHG | HEIGHT: 70 IN | WEIGHT: 315 LBS | HEART RATE: 88 BPM | RESPIRATION RATE: 16 BRPM | BODY MASS INDEX: 45.1 KG/M2 | DIASTOLIC BLOOD PRESSURE: 87 MMHG

## 2017-12-11 DIAGNOSIS — F32.9 REACTIVE DEPRESSION: ICD-10-CM

## 2017-12-11 DIAGNOSIS — Z79.4 CONTROLLED TYPE 2 DIABETES MELLITUS WITHOUT COMPLICATION, WITH LONG-TERM CURRENT USE OF INSULIN (HCC): Primary | ICD-10-CM

## 2017-12-11 DIAGNOSIS — G47.33 OSA (OBSTRUCTIVE SLEEP APNEA): ICD-10-CM

## 2017-12-11 DIAGNOSIS — I10 ESSENTIAL HYPERTENSION WITH GOAL BLOOD PRESSURE LESS THAN 130/85: ICD-10-CM

## 2017-12-11 DIAGNOSIS — E11.9 CONTROLLED TYPE 2 DIABETES MELLITUS WITHOUT COMPLICATION, WITH LONG-TERM CURRENT USE OF INSULIN (HCC): Primary | ICD-10-CM

## 2017-12-11 PROBLEM — D69.6 THROMBOCYTOPENIA (HCC): Chronic | Status: ACTIVE | Noted: 2017-12-11

## 2017-12-11 PROBLEM — D69.6 THROMBOCYTOPENIA: Chronic | Status: ACTIVE | Noted: 2017-12-11

## 2017-12-11 PROCEDURE — 99495 TRANSJ CARE MGMT MOD F2F 14D: CPT | Performed by: INTERNAL MEDICINE

## 2017-12-11 RX ORDER — INSULIN LISPRO 100 [IU]/ML
INJECTION, SOLUTION INTRAVENOUS; SUBCUTANEOUS
Refills: 0 | COMMUNITY
Start: 2017-12-02 | End: 2017-12-22

## 2017-12-12 NOTE — PROGRESS NOTES
.    HPI:    Seble Edward is a 47year old male here today for hospital follow up.    He was discharged from Inpatient hospital, [facility]  to [address] [place] [address] Date: 11/29/2017  Discharge Date: 12/1/2017  Hospital Discharge Diagnosis:  Hyperosmolar C STRIPS) In Vitro Strip Check blood sugars ac and hs   Lancet Device Does not apply Misc Test blood sugars ac and hs   FREESTYLE SYSTEM Does not apply Kit 1 each by Does not apply route as needed for Other.    Glucose Blood In Vitro Strip Test blood sugar tw sister; Other in his sister; Psychiatric in his mother; Stroke in his mother. He  reports that he has never smoked. He has never used smokeless tobacco. He reports that he drinks alcohol. He reports that he does not use drugs.      ROS:   GENERAL: weight this visit:    1. Controlled type 2 diabetes mellitus without complication, with long-term current use of insulin (HCC)    Luciano Larios is a  50year old  who presents for a recheck of  diabetes.  Diabetic control is not good  Recommendations are: continu moderate    Patient seen within 14 days from date of discharge.      Isabella Santos MD, 12/11/2017

## 2017-12-13 ENCOUNTER — HOSPITAL ENCOUNTER (OUTPATIENT)
Dept: ENDOCRINOLOGY | Facility: HOSPITAL | Age: 52
Discharge: HOME | End: 2017-12-13
Attending: INTERNAL MEDICINE
Payer: MEDICARE

## 2017-12-13 VITALS — BODY MASS INDEX: 45.1 KG/M2 | WEIGHT: 315 LBS | HEIGHT: 70 IN

## 2017-12-13 DIAGNOSIS — E11.00 UNCONTROLLED TYPE 2 DIABETES MELLITUS WITH HYPEROSMOLAR NONKETOTIC HYPERGLYCEMIA (HCC): Primary | ICD-10-CM

## 2017-12-13 RX ORDER — AMLODIPINE BESYLATE 5 MG/1
TABLET ORAL
Qty: 90 TABLET | Refills: 0 | Status: SHIPPED | OUTPATIENT
Start: 2017-12-13 | End: 2018-03-11

## 2017-12-13 RX ORDER — LISINOPRIL AND HYDROCHLOROTHIAZIDE 25; 20 MG/1; MG/1
1 TABLET ORAL DAILY
Qty: 30 TABLET | Refills: 0 | Status: SHIPPED | OUTPATIENT
Start: 2017-12-13 | End: 2018-01-10

## 2017-12-13 NOTE — TELEPHONE ENCOUNTER
Hypertensive Medications  Protocol Criteria:  · Appointment scheduled in the past 6 months or in the next 3 months  · BMP or CMP in the past 12 months  · Creatinine result < 2  Recent Outpatient Visits            2 days ago Controlled type 2 diabetes melli

## 2017-12-13 NOTE — PROGRESS NOTES
Candido Hurtado 2/26/1965 attended for Intensive Management:    Date: 12/13/2017 Start Time: 3:40 pm End Time: 4:45 pm    Ht: 5'10\" Wt: Wt Readings from Last 6 Encounters:  12/13/17 : (!) 389 lb 12.8 oz  12/11/17 : (!) 387 lb  11/29/17 : (!) 360 lb  08/25

## 2017-12-18 PROBLEM — J44.9 ASTHMA WITH COPD (CHRONIC OBSTRUCTIVE PULMONARY DISEASE) (HCC): Chronic | Status: ACTIVE | Noted: 2017-12-18

## 2017-12-18 PROBLEM — J44.89 ASTHMA WITH COPD (CHRONIC OBSTRUCTIVE PULMONARY DISEASE): Chronic | Status: ACTIVE | Noted: 2017-12-18

## 2017-12-18 PROBLEM — J44.89 ASTHMA WITH COPD (CHRONIC OBSTRUCTIVE PULMONARY DISEASE) (HCC): Chronic | Status: ACTIVE | Noted: 2017-12-18

## 2017-12-19 RX ORDER — PEN NEEDLE, DIABETIC 31 GX5/16"
NEEDLE, DISPOSABLE MISCELLANEOUS
Refills: 0 | OUTPATIENT
Start: 2017-12-19

## 2017-12-19 RX ORDER — INSULIN DETEMIR 100 [IU]/ML
INJECTION, SOLUTION SUBCUTANEOUS
Qty: 30 ML | Refills: 0 | OUTPATIENT
Start: 2017-12-19

## 2017-12-19 RX ORDER — INSULIN LISPRO 100 [IU]/ML
INJECTION, SOLUTION INTRAVENOUS; SUBCUTANEOUS
Qty: 30 ML | Refills: 0 | OUTPATIENT
Start: 2017-12-19

## 2017-12-22 ENCOUNTER — PATIENT MESSAGE (OUTPATIENT)
Dept: ENDOCRINOLOGY CLINIC | Facility: CLINIC | Age: 52
End: 2017-12-22

## 2017-12-22 ENCOUNTER — TELEPHONE (OUTPATIENT)
Dept: ENDOCRINOLOGY CLINIC | Facility: CLINIC | Age: 52
End: 2017-12-22

## 2017-12-22 RX ORDER — INSULIN LISPRO 100 [IU]/ML
INJECTION, SOLUTION INTRAVENOUS; SUBCUTANEOUS
Qty: 30 ML | Refills: 1 | Status: SHIPPED | OUTPATIENT
Start: 2017-12-22 | End: 2018-01-19

## 2017-12-22 NOTE — TELEPHONE ENCOUNTER
From: Sophie Taylor  To: Yeison Vickers MD  Sent: 2017 12:37 PM CST  Subject: Prescription Question    I am out of Levemir. I have 3 unused Humalog pens and 1 half used as if this morning. Pen needles I now have. Do I discontinue the Levemir?    2

## 2017-12-22 NOTE — TELEPHONE ENCOUNTER
Per chart it seems patient has never seen 31 Laurie Horne in the office. Levemir last filled by Celia Díaz DO. Called the patient. It seems patient was recently in the hospital. Dr. Ramirez Rising what dose of insulins should the patient be taking?  Can sent refill to the

## 2017-12-22 NOTE — TELEPHONE ENCOUNTER
Returned the patients call. He states levemir is not covered. Called [facility]. Levemir is covered. It is $6. Humalog is covered but they are out of stock. Called patient. He states he has humalog he does not need a refill of that one right now.  He will go

## 2018-01-08 ENCOUNTER — PATIENT MESSAGE (OUTPATIENT)
Dept: ENDOCRINOLOGY CLINIC | Facility: CLINIC | Age: 53
End: 2018-01-08

## 2018-01-08 RX ORDER — BLOOD SUGAR DIAGNOSTIC
STRIP MISCELLANEOUS
Qty: 150 STRIP | Refills: 0 | Status: SHIPPED | OUTPATIENT
Start: 2018-01-08 | End: 2021-12-13

## 2018-01-08 NOTE — TELEPHONE ENCOUNTER
From: Chriss Valdez  To: Tatum Wu MD  Sent: 1/8/2018 2:42 AM CST  Subject: Prescription Question    Running low on test strips. Could you please send a prescription. I have different insurance plan. Info should be in your system.  Call if u need to s

## 2018-01-09 ENCOUNTER — TELEPHONE (OUTPATIENT)
Dept: ENDOCRINOLOGY CLINIC | Facility: CLINIC | Age: 53
End: 2018-01-09

## 2018-01-09 ENCOUNTER — HOSPITAL ENCOUNTER (OUTPATIENT)
Dept: ENDOCRINOLOGY | Facility: HOSPITAL | Age: 53
Discharge: HOME | End: 2018-01-09
Attending: INTERNAL MEDICINE
Payer: MEDICARE

## 2018-01-09 VITALS — WEIGHT: 315 LBS | HEIGHT: 70 IN | BODY MASS INDEX: 45.1 KG/M2

## 2018-01-09 DIAGNOSIS — E11.9 CONTROLLED TYPE 2 DIABETES MELLITUS WITHOUT COMPLICATION, WITH LONG-TERM CURRENT USE OF INSULIN (HCC): Primary | ICD-10-CM

## 2018-01-09 DIAGNOSIS — Z79.4 CONTROLLED TYPE 2 DIABETES MELLITUS WITHOUT COMPLICATION, WITH LONG-TERM CURRENT USE OF INSULIN (HCC): Primary | ICD-10-CM

## 2018-01-09 DIAGNOSIS — IMO0001 UNCONTROLLED TYPE 2 DIABETES MELLITUS WITHOUT COMPLICATION, WITH LONG-TERM CURRENT USE OF INSULIN: ICD-10-CM

## 2018-01-09 RX ORDER — BLOOD SUGAR DIAGNOSTIC
STRIP MISCELLANEOUS
Qty: 200 STRIP | Refills: 2 | Status: SHIPPED | OUTPATIENT
Start: 2018-01-09 | End: 2018-02-01

## 2018-01-09 RX ORDER — BLOOD-GLUCOSE METER
EACH MISCELLANEOUS
Qty: 1 KIT | Refills: 0 | Status: SHIPPED | OUTPATIENT
Start: 2018-01-09

## 2018-01-09 NOTE — TELEPHONE ENCOUNTER
Current Outpatient Prescriptions:  Glucose Blood (FREESTYLE TEST) In Vitro Strip Check 3-4 times daily Disp: 150 strip Rfl: 0     Per pharmacy med not covered Alt include Accu-Chek or True Metrix pls fax RX for machine,strips,lancets,strips

## 2018-01-09 NOTE — PROGRESS NOTES
Abel Miles 2/26/1965 attended for Intensive Management:    Date: 1/9/2018 Start Time: 1030 End Time: 1200    Ht: 5'10\" Wt: Wt Readings from Last 6 Encounters:  01/09/18 : (!) 389 lb 3.2 oz  12/13/17 : (!) 389 lb 12.8 oz  12/11/17 : (!) 387 lb  11/29/ information whenever available. Choose lean/medium fat protein vs high fat protein using information taught. Keep records and email to [facility] for evaluation. Pt verbalized understanding and has no further questions.     Follow up appointmen

## 2018-01-10 RX ORDER — LISINOPRIL AND HYDROCHLOROTHIAZIDE 25; 20 MG/1; MG/1
1 TABLET ORAL DAILY
Qty: 30 TABLET | Refills: 0 | Status: SHIPPED | OUTPATIENT
Start: 2018-01-10 | End: 2018-02-01

## 2018-01-19 ENCOUNTER — OFFICE VISIT (OUTPATIENT)
Dept: ENDOCRINOLOGY CLINIC | Facility: CLINIC | Age: 53
End: 2018-01-19

## 2018-01-19 VITALS
BODY MASS INDEX: 45.1 KG/M2 | HEART RATE: 96 BPM | SYSTOLIC BLOOD PRESSURE: 119 MMHG | HEIGHT: 70 IN | WEIGHT: 315 LBS | DIASTOLIC BLOOD PRESSURE: 75 MMHG

## 2018-01-19 DIAGNOSIS — E11.8 UNCONTROLLED TYPE 2 DIABETES MELLITUS WITH COMPLICATION, WITH LONG-TERM CURRENT USE OF INSULIN (HCC): Primary | ICD-10-CM

## 2018-01-19 DIAGNOSIS — Z79.4 UNCONTROLLED TYPE 2 DIABETES MELLITUS WITH COMPLICATION, WITH LONG-TERM CURRENT USE OF INSULIN (HCC): Primary | ICD-10-CM

## 2018-01-19 DIAGNOSIS — E11.65 UNCONTROLLED TYPE 2 DIABETES MELLITUS WITH COMPLICATION, WITH LONG-TERM CURRENT USE OF INSULIN (HCC): Primary | ICD-10-CM

## 2018-01-19 LAB
CARTRIDGE LOT#: ABNORMAL NUMERIC
GLUCOSE BLOOD: 79
HEMOGLOBIN A1C: 9 % (ref 4.3–5.6)
TEST STRIP LOT #: NORMAL NUMERIC

## 2018-01-19 PROCEDURE — 36416 COLLJ CAPILLARY BLOOD SPEC: CPT | Performed by: INTERNAL MEDICINE

## 2018-01-19 PROCEDURE — 82962 GLUCOSE BLOOD TEST: CPT | Performed by: INTERNAL MEDICINE

## 2018-01-19 PROCEDURE — 99214 OFFICE O/P EST MOD 30 MIN: CPT | Performed by: INTERNAL MEDICINE

## 2018-01-19 PROCEDURE — G0463 HOSPITAL OUTPT CLINIC VISIT: HCPCS | Performed by: INTERNAL MEDICINE

## 2018-01-19 PROCEDURE — 83036 HEMOGLOBIN GLYCOSYLATED A1C: CPT | Performed by: INTERNAL MEDICINE

## 2018-01-19 RX ORDER — METFORMIN HYDROCHLORIDE 500 MG/1
500 TABLET, EXTENDED RELEASE ORAL 2 TIMES DAILY WITH MEALS
Qty: 60 TABLET | Refills: 6 | Status: SHIPPED | OUTPATIENT
Start: 2018-01-19 | End: 2018-04-30

## 2018-01-19 NOTE — PROGRESS NOTES
Name: Kameron Miles  Date: 1/19/2018    Referring Physician: Abhinav Kilgore    HISTORY OF PRESENT ILLNESS   Kameron Miles is a 47year old male who presents for diabetes mellitus, he was recently seen during hospitalization for uncontrolled hypergl each, Rfl: 2  •  Glucose Blood (FREESTYLE TEST) In Vitro Strip, Check 3-4 times daily, Disp: 150 strip, Rfl: 0  •  insulin detemir 100 UNIT/ML Subcutaneous Solution Pen-injector, Inject 85 Units into the skin daily.  (Patient taking differently: Inject 85 U Years of education:                 Number of children:               Social History Main Topics    Smoking status: Never Smoker                                                                Smokeless tobacco: Never Used                        Alcohol use sounds and no palpable masses in abdomen, organomegaly or tenderness   Musculoskeletal:  normal muscle strength and tone  Skin:  normal moisture and skin texture  Hair & Nails:  normal scalp hair     Hematologic:  no excessive bruising  Neuro:  sensory ag

## 2018-01-19 NOTE — PATIENT INSTRUCTIONS
Decrease Levemir to 50 units SQ QAM, 70 units SQ QPM     Humalog  INSULIN SLIDING SCALE  Base Values  Breakfast: 20  Lunch: 20  Dinner: 20  Ranges:  80-99: -2  100-119: 0  120-139: 0  140-159: 1  160-179: 2  180-199: 3  200-219: 4  220-239: 5  240-259: 6

## 2018-02-01 RX ORDER — BLOOD SUGAR DIAGNOSTIC
STRIP MISCELLANEOUS
Qty: 200 STRIP | Refills: 5 | Status: SHIPPED
Start: 2018-02-01 | End: 2020-02-18

## 2018-02-01 NOTE — TELEPHONE ENCOUNTER
From: Abel Miles  Sent: 2/1/2018 8:51 AM CST  Subject: Medication Renewal Request    Abel Miles would like a refill of the following medications:     Glucose Blood (ACCU-CHEK THOMAS PLUS) In Vitro Strip Jennifer De Paz MD]    Preferred pharmacy[facility]

## 2018-02-03 RX ORDER — LISINOPRIL AND HYDROCHLOROTHIAZIDE 25; 20 MG/1; MG/1
1 TABLET ORAL DAILY
Qty: 30 TABLET | Refills: 0 | Status: SHIPPED
Start: 2018-02-03 | End: 2018-03-13

## 2018-02-12 NOTE — TELEPHONE ENCOUNTER
From: Luciano Larios  Sent: 2/10/2018 1:10 PM CST  Subject: Medication Renewal Request    Luciano Larios would like a refill of the following medications:     insulin detemir (LEVEMIR FLEXTOUCH) 100 UNIT/ML Subcutaneous Solution Pen-injector [Marleni Walls

## 2018-02-15 ENCOUNTER — PATIENT MESSAGE (OUTPATIENT)
Dept: INTERNAL MEDICINE CLINIC | Facility: CLINIC | Age: 53
End: 2018-02-15

## 2018-02-15 DIAGNOSIS — Z79.4 TYPE 2 DIABETES MELLITUS WITH COMPLICATION, WITH LONG-TERM CURRENT USE OF INSULIN (HCC): Primary | ICD-10-CM

## 2018-02-15 DIAGNOSIS — E11.8 TYPE 2 DIABETES MELLITUS WITH COMPLICATION, WITH LONG-TERM CURRENT USE OF INSULIN (HCC): Primary | ICD-10-CM

## 2018-02-16 NOTE — TELEPHONE ENCOUNTER
From: Abel Miles  To: Twin Chapman MD  Sent: 2/15/2018 6:34 PM CST  Subject: Referral Annemarie Delacruz requesting a referral for a follow up with Dr. Que Hurtado. Currently scheduled for 3-22-18. Thank you, and have great day!

## 2018-02-19 ENCOUNTER — OFFICE VISIT (OUTPATIENT)
Dept: OPTOMETRY | Facility: CLINIC | Age: 53
End: 2018-02-19

## 2018-02-19 DIAGNOSIS — E11.9 TYPE 2 DIABETES MELLITUS WITHOUT COMPLICATION, WITHOUT LONG-TERM CURRENT USE OF INSULIN (HCC): Primary | ICD-10-CM

## 2018-02-19 DIAGNOSIS — H25.13 AGE-RELATED NUCLEAR CATARACT OF BOTH EYES: ICD-10-CM

## 2018-02-19 PROCEDURE — 92014 COMPRE OPH EXAM EST PT 1/>: CPT | Performed by: OPTOMETRIST

## 2018-02-20 NOTE — PROGRESS NOTES
Azael Cisneros is a 47year old male. HPI:     HPI     Diabetic Eye Exam   Diabetes characteristics include Type 2, controlled with diet, on insulin and taking oral medications. Duration of 1 year.            Comments   Patient  Is in for an annual olga Outpatient Prescriptions:  insulin detemir (LEVEMIR FLEXTOUCH) 100 UNIT/ML Subcutaneous Solution Pen-injector Inject 50 units SQ QAM, 70 units SQ QPM Disp: 30 mL Rfl: 3   Insulin Aspart Pen (NOVOLOG FLEXPEN) 100 UNIT/ML Subcutaneous Solution Pen-injector I 400 MG Oral Tab Take 1 tablet (400 mg total) by mouth daily. Disp: 30 tablet Rfl: 5   Pantoprazole Sodium 20 MG Oral Tab EC Take 1 tablet (20 mg total) by mouth daily.  Disp: 30 tablet Rfl: 3   docusate sodium 100 MG Oral Cap Take 1 capsule (100 mg total) b BDR Normal no BDR    Vessels Normal Normal    Periphery Normal Normal            Refraction     Wearing Rx       Sphere    Right +1.00    Left +1.00    Type:  OTC           Wearing Rx #2       Sphere    Right +3.00    Left +3.00    Type:  OTC reading only

## 2018-02-21 ENCOUNTER — TELEPHONE (OUTPATIENT)
Dept: ENDOCRINOLOGY CLINIC | Facility: CLINIC | Age: 53
End: 2018-02-21

## 2018-02-21 NOTE — TELEPHONE ENCOUNTER
Pt is calling to see if the insulin that was originally prescribed levemir  If he can get it changed to lantus.  Please call thank you

## 2018-02-22 ENCOUNTER — PATIENT MESSAGE (OUTPATIENT)
Dept: ENDOCRINOLOGY CLINIC | Facility: CLINIC | Age: 53
End: 2018-02-22

## 2018-02-22 NOTE — TELEPHONE ENCOUNTER
From: Sandy Saeed  To: Kortney Gong MD  Sent: 2/22/2018 4:24 PM CST  Subject: Prescription Question    I having having financial issues with the type of insulin. Can I switch to Lantus?

## 2018-02-23 ENCOUNTER — PATIENT OUTREACH (OUTPATIENT)
Dept: CASE MANAGEMENT | Age: 53
End: 2018-02-23

## 2018-02-23 NOTE — PROGRESS NOTES
Outreached to patient in regards to enrollment to Chronic Care Management program. Patient stated he is facing a lot of issues right now and stated that does not want to enroll in any program that may interfere what is going on with him right not.   I infor

## 2018-02-28 ENCOUNTER — TELEPHONE (OUTPATIENT)
Dept: HEMATOLOGY/ONCOLOGY | Facility: HOSPITAL | Age: 53
End: 2018-02-28

## 2018-02-28 NOTE — TELEPHONE ENCOUNTER
Raghav nicholson said he received a message to come in for lab work today. He is not able to come in today but will do tomorrow.  mejia

## 2018-03-01 ENCOUNTER — LAB ENCOUNTER (OUTPATIENT)
Dept: LAB | Facility: HOSPITAL | Age: 53
End: 2018-03-01
Attending: INTERNAL MEDICINE
Payer: MEDICARE

## 2018-03-01 ENCOUNTER — OFFICE VISIT (OUTPATIENT)
Dept: HEMATOLOGY/ONCOLOGY | Facility: HOSPITAL | Age: 53
End: 2018-03-01
Attending: INTERNAL MEDICINE
Payer: MEDICARE

## 2018-03-01 VITALS
HEART RATE: 103 BPM | WEIGHT: 315 LBS | DIASTOLIC BLOOD PRESSURE: 81 MMHG | RESPIRATION RATE: 18 BRPM | BODY MASS INDEX: 45.1 KG/M2 | SYSTOLIC BLOOD PRESSURE: 157 MMHG | HEIGHT: 70 IN | TEMPERATURE: 98 F

## 2018-03-01 DIAGNOSIS — C49.A2 GASTROINTESTINAL STROMAL TUMOR (GIST) OF STOMACH (HCC): Primary | ICD-10-CM

## 2018-03-01 DIAGNOSIS — C49.A2 GASTROINTESTINAL STROMAL TUMOR (GIST) OF STOMACH (HCC): ICD-10-CM

## 2018-03-01 DIAGNOSIS — R60.0 EDEMA, LOWER EXTREMITY: ICD-10-CM

## 2018-03-01 LAB
ALBUMIN SERPL BCP-MCNC: 4 G/DL (ref 3.5–4.8)
ALBUMIN/GLOB SERPL: 1.3 {RATIO} (ref 1–2)
ALP SERPL-CCNC: 76 U/L (ref 32–100)
ALT SERPL-CCNC: 35 U/L (ref 17–63)
ANION GAP SERPL CALC-SCNC: 9 MMOL/L (ref 0–18)
AST SERPL-CCNC: 25 U/L (ref 15–41)
BASOPHILS # BLD: 0.1 K/UL (ref 0–0.2)
BASOPHILS NFR BLD: 1 %
BILIRUB SERPL-MCNC: 0.6 MG/DL (ref 0.3–1.2)
BUN SERPL-MCNC: 18 MG/DL (ref 8–20)
BUN/CREAT SERPL: 14.3 (ref 10–20)
CALCIUM SERPL-MCNC: 9.2 MG/DL (ref 8.5–10.5)
CHLORIDE SERPL-SCNC: 104 MMOL/L (ref 95–110)
CO2 SERPL-SCNC: 26 MMOL/L (ref 22–32)
CREAT SERPL-MCNC: 1.26 MG/DL (ref 0.5–1.5)
EOSINOPHIL # BLD: 0.1 K/UL (ref 0–0.7)
EOSINOPHIL NFR BLD: 2 %
ERYTHROCYTE [DISTWIDTH] IN BLOOD BY AUTOMATED COUNT: 15.6 % (ref 11–15)
GLOBULIN PLAS-MCNC: 3.2 G/DL (ref 2.5–3.7)
GLUCOSE SERPL-MCNC: 127 MG/DL (ref 70–99)
HCT VFR BLD AUTO: 41.9 % (ref 41–52)
HGB BLD-MCNC: 13.6 G/DL (ref 13.5–17.5)
LYMPHOCYTES # BLD: 1.9 K/UL (ref 1–4)
LYMPHOCYTES NFR BLD: 23 %
MCH RBC QN AUTO: 26.5 PG (ref 27–32)
MCHC RBC AUTO-ENTMCNC: 32.5 G/DL (ref 32–37)
MCV RBC AUTO: 81.5 FL (ref 80–100)
MONOCYTES # BLD: 0.6 K/UL (ref 0–1)
MONOCYTES NFR BLD: 7 %
NEUTROPHILS # BLD AUTO: 5.6 K/UL (ref 1.8–7.7)
NEUTROPHILS NFR BLD: 68 %
OSMOLALITY UR CALC.SUM OF ELEC: 291 MOSM/KG (ref 275–295)
PATIENT FASTING: NO
PLATELET # BLD AUTO: 203 K/UL (ref 140–400)
PMV BLD AUTO: 8.9 FL (ref 7.4–10.3)
POTASSIUM SERPL-SCNC: 3.8 MMOL/L (ref 3.3–5.1)
PROT SERPL-MCNC: 7.2 G/DL (ref 5.9–8.4)
RBC # BLD AUTO: 5.14 M/UL (ref 4.5–5.9)
SODIUM SERPL-SCNC: 139 MMOL/L (ref 136–144)
WBC # BLD AUTO: 8.2 K/UL (ref 4–11)

## 2018-03-01 PROCEDURE — 99213 OFFICE O/P EST LOW 20 MIN: CPT | Performed by: INTERNAL MEDICINE

## 2018-03-01 PROCEDURE — 80053 COMPREHEN METABOLIC PANEL: CPT

## 2018-03-01 PROCEDURE — 36415 COLL VENOUS BLD VENIPUNCTURE: CPT

## 2018-03-01 PROCEDURE — 85025 COMPLETE CBC W/AUTO DIFF WBC: CPT

## 2018-03-01 NOTE — PROGRESS NOTES
Cancer Center Progress Note    Patient Name: Sunni Fine   YOB: 1965   Medical Record Number: [id number]   Attending Physician: Milton Talavera M.D.        Chief Complaint:  GIST of the stomach    History of Present Illness:  Cancer history: UPPER GI ENDOSCOPY,EXAM    Family History:  Family History   Problem Relation Age of Onset   • Alcohol and Other Disorders Associated Father    • Stroke Mother    • Psychiatric Mother      dementia   • Diabetes Sister    • Obesity Sister    • Cancer Germania Nieves sugar 3-4 times daily. OK to substitute to preferred brand. , Disp: 200 each, Rfl: 2  •  Glucose Blood (FREESTYLE TEST) In Vitro Strip, Check 3-4 times daily, Disp: 150 strip, Rfl: 0  •  AMLODIPINE BESYLATE 5 MG Oral Tab, TAKE 1 TABLET(5 MG) BY MOUTH DAILY, PERRL. Oropharynx is clear. Neck: No JVD. No palpable lymphadenopathy. Neck is supple. Lymphatics: There is no palpable peripheral lymphadenopathy   Chest: Clear to auscultation. Cardiovascular: Regular rate and rhythm.  Normal S1S2  Abdomen: Soft, non be due for repeat CT scan      The patient's emotional well being was assessed and resources were discussed. Appropriate resources were reviewed and discussed with the pateint and family.      Leatha San MD

## 2018-03-07 ENCOUNTER — TELEPHONE (OUTPATIENT)
Dept: HEMATOLOGY/ONCOLOGY | Facility: HOSPITAL | Age: 53
End: 2018-03-07

## 2018-03-09 ENCOUNTER — PATIENT OUTREACH (OUTPATIENT)
Dept: CASE MANAGEMENT | Age: 53
End: 2018-03-09

## 2018-03-09 NOTE — PROGRESS NOTES
Outreached to patient in regards to letter mailed out for enrollment to Chronic Care Management program. Left message for patient to return my call at ext. [phone]. Thank you.

## 2018-03-11 RX ORDER — AMLODIPINE BESYLATE 5 MG/1
TABLET ORAL
Qty: 90 TABLET | Refills: 0 | Status: SHIPPED | OUTPATIENT
Start: 2018-03-11 | End: 2018-05-08

## 2018-03-13 RX ORDER — LISINOPRIL AND HYDROCHLOROTHIAZIDE 25; 20 MG/1; MG/1
1 TABLET ORAL DAILY
Qty: 30 TABLET | Refills: 1 | Status: SHIPPED | OUTPATIENT
Start: 2018-03-13 | End: 2018-05-08

## 2018-03-13 NOTE — TELEPHONE ENCOUNTER
Hypertensive Medications  Protocol Criteria:  · Appointment scheduled in the past 6 months or in the next 3 months  · BMP or CMP in the past 12 months  · Creatinine result < 2  Recent Outpatient Visits            1 week ago Gastrointestinal stromal tumor ( AGRATIO 1.2 01/06/2015   ANIONGAP 9 03/01/2018   OSMOCALC 291 03/01/2018       Hypertensive Medications Protocol Passed3/13 4:46 PM   CMP or BMP in past 12 months    Serum Creatinine < 2.0    Appointment in past 6 or next 3 months       Pending Prescriptio

## 2018-03-14 ENCOUNTER — PATIENT MESSAGE (OUTPATIENT)
Dept: ENDOCRINOLOGY CLINIC | Facility: CLINIC | Age: 53
End: 2018-03-14

## 2018-03-14 DIAGNOSIS — E11.65 UNCONTROLLED TYPE 2 DIABETES MELLITUS WITH HYPERGLYCEMIA, WITHOUT LONG-TERM CURRENT USE OF INSULIN (HCC): Primary | ICD-10-CM

## 2018-03-15 NOTE — TELEPHONE ENCOUNTER
Lab orders entered as written. Notified pt that labs orders are in placed and that he will need to fast 12 hours for testing.

## 2018-03-15 NOTE — TELEPHONE ENCOUNTER
From: Silvia Perez  To: Brigido Álvarez MD  Sent: 3/14/2018 10:16 PM CDT  Subject: Non-Urgent Mj Castorena Dr,    I am unsure if you need blood labs done before appointment on the 22nd. Please advise. Have a great day!

## 2018-03-16 ENCOUNTER — PATIENT OUTREACH (OUTPATIENT)
Dept: CASE MANAGEMENT | Age: 53
End: 2018-03-16

## 2018-03-16 NOTE — PROGRESS NOTES
Outreached to patient in regards to letter mailed for enrollment to Chronic Care Management program. Patient stated he did not receive letter, but also did state he has had and issue with he mail delivery.   Patient expressed a great interest but would like

## 2018-04-03 ENCOUNTER — PATIENT OUTREACH (OUTPATIENT)
Dept: CASE MANAGEMENT | Age: 53
End: 2018-04-03

## 2018-04-03 NOTE — PROGRESS NOTES
Outreached to patient in regards to enrollment to Chronic Care Management program. Left message for patient to return my call at ext. [phone]. Thank you.

## 2018-04-04 ENCOUNTER — APPOINTMENT (OUTPATIENT)
Dept: LAB | Facility: HOSPITAL | Age: 53
End: 2018-04-04
Attending: INTERNAL MEDICINE
Payer: MEDICARE

## 2018-04-04 DIAGNOSIS — E11.65 UNCONTROLLED TYPE 2 DIABETES MELLITUS WITH HYPERGLYCEMIA, WITHOUT LONG-TERM CURRENT USE OF INSULIN (HCC): ICD-10-CM

## 2018-04-04 PROCEDURE — 82570 ASSAY OF URINE CREATININE: CPT

## 2018-04-04 PROCEDURE — 80061 LIPID PANEL: CPT

## 2018-04-04 PROCEDURE — 80053 COMPREHEN METABOLIC PANEL: CPT

## 2018-04-04 PROCEDURE — 82043 UR ALBUMIN QUANTITATIVE: CPT

## 2018-04-04 PROCEDURE — 36415 COLL VENOUS BLD VENIPUNCTURE: CPT

## 2018-04-04 PROCEDURE — 84443 ASSAY THYROID STIM HORMONE: CPT

## 2018-04-05 ENCOUNTER — OFFICE VISIT (OUTPATIENT)
Dept: ENDOCRINOLOGY CLINIC | Facility: CLINIC | Age: 53
End: 2018-04-05

## 2018-04-05 VITALS
DIASTOLIC BLOOD PRESSURE: 62 MMHG | SYSTOLIC BLOOD PRESSURE: 122 MMHG | HEIGHT: 70 IN | WEIGHT: 315 LBS | BODY MASS INDEX: 45.1 KG/M2 | HEART RATE: 93 BPM

## 2018-04-05 DIAGNOSIS — IMO0001 UNCONTROLLED TYPE 2 DIABETES MELLITUS WITHOUT COMPLICATION, WITH LONG-TERM CURRENT USE OF INSULIN: Primary | ICD-10-CM

## 2018-04-05 PROCEDURE — 36416 COLLJ CAPILLARY BLOOD SPEC: CPT | Performed by: INTERNAL MEDICINE

## 2018-04-05 PROCEDURE — 99214 OFFICE O/P EST MOD 30 MIN: CPT | Performed by: INTERNAL MEDICINE

## 2018-04-05 PROCEDURE — 83036 HEMOGLOBIN GLYCOSYLATED A1C: CPT | Performed by: INTERNAL MEDICINE

## 2018-04-05 PROCEDURE — G0463 HOSPITAL OUTPT CLINIC VISIT: HCPCS | Performed by: INTERNAL MEDICINE

## 2018-04-05 PROCEDURE — 82962 GLUCOSE BLOOD TEST: CPT | Performed by: INTERNAL MEDICINE

## 2018-04-05 NOTE — PATIENT INSTRUCTIONS
Levemir 40 units SQ BID     Novolog  INSULIN SLIDING SCALE  Base Values  Breakfast: 15  Lunch: 15  Dinner: 15  Ranges:  80-99: -2  100-119: 0  120-139: 0  140-159: 1  160-179: 1  180-199: 2  200-219: 2  220-239: 3  240-259: 3  260-279: 4  280-299: 4  300-3

## 2018-04-05 NOTE — PROGRESS NOTES
Name: Chastity Spear  Date: 4/5/2018    Referring Physician: Octavio Holt    HISTORY OF PRESENT ILLNESS   Chastity Spear is a 46year old male who presents for diabetes mellitus, he was recently seen during hospitalization for uncontrolled hypergly THOMAS PLUS) In Vitro Strip, Use to test blood sugars 3-4 times daily. OK to substitute for preferred brand. , Disp: 200 strip, Rfl: 5  •  MetFORMIN HCl  MG Oral Tablet 24 Hr, Take 1 tablet (500 mg total) by mouth 2 (two) times daily with meals. , Disp: No Known Allergies    Social History:   Social History    Marital status:              Spouse name:                       Years of education:                 Number of children:               Social History Main Topics    Smoking status: Never Smo rhythm, , no murmurs, S3 or S4  Gastrointestinal:  normal bowel sounds and no palpable masses in abdomen, organomegaly or tenderness   Musculoskeletal:  normal muscle strength and tone  Skin:  normal moisture and skin texture  Hair & Nails:  normal scalp h

## 2018-04-30 ENCOUNTER — PATIENT MESSAGE (OUTPATIENT)
Dept: ENDOCRINOLOGY CLINIC | Facility: CLINIC | Age: 53
End: 2018-04-30

## 2018-04-30 RX ORDER — METFORMIN HYDROCHLORIDE 500 MG/1
500 TABLET, EXTENDED RELEASE ORAL 2 TIMES DAILY WITH MEALS
Qty: 180 TABLET | Refills: 0 | Status: SHIPPED | OUTPATIENT
Start: 2018-04-30 | End: 2018-05-01

## 2018-04-30 NOTE — TELEPHONE ENCOUNTER
Current Outpatient Prescriptions:  MetFORMIN HCl  MG Oral Tablet 24 Hr Take 1 tablet (500 mg total) by mouth 2 (two) times daily with meals.  Disp: 60 tablet Rfl: 6     Refill

## 2018-05-02 RX ORDER — METFORMIN HYDROCHLORIDE 500 MG/1
500 TABLET, EXTENDED RELEASE ORAL 2 TIMES DAILY WITH MEALS
Qty: 180 TABLET | Refills: 0 | Status: SHIPPED | OUTPATIENT
Start: 2018-05-02 | End: 2019-05-14

## 2018-05-07 ENCOUNTER — PATIENT MESSAGE (OUTPATIENT)
Dept: INTERNAL MEDICINE CLINIC | Facility: CLINIC | Age: 53
End: 2018-05-07

## 2018-05-08 NOTE — TELEPHONE ENCOUNTER
From: Sunni Fine  To: Hermilo Miles MD  Sent: 5/7/2018 7:32 PM CDT  Subject: Prescription Question    Hello,    I am getting meds shipped directly from [facility]. They sent another letter to me about getting my meds.  Could you prescribe r

## 2018-05-08 NOTE — TELEPHONE ENCOUNTER
Cholesterol Medications  Protocol Criteria:  · Appointment scheduled in the past 12 months or in the next 3 months  · ALT & LDL on file in the past 12 months  · ALT result < 80  · LDL result <130   Recent Outpatient Visits            1 month ago Uncontroll long-term current use of insulin [facility])    Cheyenne Argueta MD    Office Visit    2 months ago Gastrointestinal stromal tumor (GIST) of stomach [facility])    [facility] tablet by mouth daily. atorvastatin 20 MG Oral Tab 90 tablet 0     Sig: Take 1 tablet (20 mg total) by mouth nightly. Pt due for ov.  pls advise, thanks in advance.

## 2018-05-15 RX ORDER — AMLODIPINE BESYLATE 5 MG/1
TABLET ORAL
Qty: 90 TABLET | Refills: 0 | Status: SHIPPED | OUTPATIENT
Start: 2018-05-15 | End: 2018-11-28

## 2018-05-15 RX ORDER — LISINOPRIL AND HYDROCHLOROTHIAZIDE 25; 20 MG/1; MG/1
1 TABLET ORAL DAILY
Qty: 90 TABLET | Refills: 0 | Status: SHIPPED | OUTPATIENT
Start: 2018-05-15 | End: 2018-12-26

## 2018-05-15 RX ORDER — ATORVASTATIN CALCIUM 20 MG/1
20 TABLET, FILM COATED ORAL NIGHTLY
Qty: 90 TABLET | Refills: 0 | Status: SHIPPED | OUTPATIENT
Start: 2018-05-15 | End: 2018-11-28

## 2018-05-29 RX ORDER — INSULIN LISPRO 100 [IU]/ML
INJECTION, SOLUTION INTRAVENOUS; SUBCUTANEOUS
Qty: 30 ML | Refills: 0 | Status: SHIPPED | OUTPATIENT
Start: 2018-05-29 | End: 2018-06-11

## 2018-05-29 NOTE — TELEPHONE ENCOUNTER
LOV 4/5/18 - No F/U sched    Per LOV Note - \"Decrease Novolog to 15 units SQ TID with meals\"    Filler per [facility] protocol

## 2018-06-10 ENCOUNTER — PATIENT MESSAGE (OUTPATIENT)
Dept: ENDOCRINOLOGY CLINIC | Facility: CLINIC | Age: 53
End: 2018-06-10

## 2018-06-11 RX ORDER — INSULIN LISPRO 100 [IU]/ML
INJECTION, SOLUTION INTRAVENOUS; SUBCUTANEOUS
Qty: 15 ML | Refills: 0 | Status: SHIPPED | OUTPATIENT
Start: 2018-06-11 | End: 2018-07-05

## 2018-06-11 NOTE — TELEPHONE ENCOUNTER
From: Sisi Mancia  To: Omer Fuller MD  Sent: 6/10/2018 8:35 PM CDT  Subject: Jack Kingston Bolds,    I can no longer afford the out of pocket costs for more than 5 pens of each type of insulin (Humalog and Lantus) per month.  Coul

## 2018-06-28 ENCOUNTER — TELEPHONE (OUTPATIENT)
Dept: HEMATOLOGY/ONCOLOGY | Facility: HOSPITAL | Age: 53
End: 2018-06-28

## 2018-06-28 NOTE — TELEPHONE ENCOUNTER
I left a voice message on  for Yennifer Barboza confirming his CT was approved by [facility]. The test  on  and has not been completed.

## 2018-07-02 ENCOUNTER — PATIENT MESSAGE (OUTPATIENT)
Dept: ENDOCRINOLOGY CLINIC | Facility: CLINIC | Age: 53
End: 2018-07-02

## 2018-07-05 RX ORDER — INSULIN LISPRO 100 [IU]/ML
INJECTION, SOLUTION INTRAVENOUS; SUBCUTANEOUS
Qty: 45 ML | Refills: 2 | Status: SHIPPED | OUTPATIENT
Start: 2018-07-05 | End: 2019-02-06

## 2018-07-05 NOTE — TELEPHONE ENCOUNTER
See request above. Patient asking if ok to get written prescriptions for Lantus, Levemir, and Humalog mailed to him so that he can check pharmacies to see if he can find them at cheaper price?  Pended if ok

## 2018-07-05 NOTE — TELEPHONE ENCOUNTER
From: [facility]  To: Lizzy Villela MD  Sent: 7/2/2018 7:58 PM CDT  Subject: Hyla July Dr. Chip Amaya. Could you please send a paper prescription to me at [email or web address].  I am having issues with affording my medication and am wo

## 2018-09-04 RX ORDER — INSULIN LISPRO 100 [IU]/ML
INJECTION, SOLUTION INTRAVENOUS; SUBCUTANEOUS
Qty: 15 ML | Refills: 5 | Status: SHIPPED | OUTPATIENT
Start: 2018-09-04 | End: 2019-05-14

## 2018-11-28 RX ORDER — AMLODIPINE BESYLATE 5 MG/1
TABLET ORAL
Qty: 90 TABLET | Refills: 0 | Status: SHIPPED | OUTPATIENT
Start: 2018-11-28 | End: 2019-03-28

## 2018-11-28 RX ORDER — LISINOPRIL AND HYDROCHLOROTHIAZIDE 25; 20 MG/1; MG/1
1 TABLET ORAL DAILY
Qty: 30 TABLET | Refills: 0 | Status: SHIPPED | OUTPATIENT
Start: 2018-11-28 | End: 2018-12-26

## 2018-11-28 RX ORDER — ATORVASTATIN CALCIUM 20 MG/1
TABLET, FILM COATED ORAL
Qty: 90 TABLET | Refills: 0 | Status: SHIPPED | OUTPATIENT
Start: 2018-11-28 | End: 2019-03-28

## 2018-12-11 ENCOUNTER — OFFICE VISIT (OUTPATIENT)
Dept: INTERNAL MEDICINE CLINIC | Facility: CLINIC | Age: 53
End: 2018-12-11

## 2018-12-11 VITALS
BODY MASS INDEX: 58 KG/M2 | SYSTOLIC BLOOD PRESSURE: 137 MMHG | HEART RATE: 99 BPM | DIASTOLIC BLOOD PRESSURE: 78 MMHG | WEIGHT: 315 LBS | RESPIRATION RATE: 20 BRPM

## 2018-12-11 DIAGNOSIS — D69.6 THROMBOCYTOPENIA (HCC): ICD-10-CM

## 2018-12-11 DIAGNOSIS — J44.9 ASTHMA WITH COPD (CHRONIC OBSTRUCTIVE PULMONARY DISEASE) (HCC): ICD-10-CM

## 2018-12-11 DIAGNOSIS — H52.4 PRESBYOPIA: ICD-10-CM

## 2018-12-11 DIAGNOSIS — C49.A2 GASTROINTESTINAL STROMAL TUMOR (GIST) OF STOMACH (HCC): ICD-10-CM

## 2018-12-11 DIAGNOSIS — M15.9 PRIMARY OSTEOARTHRITIS INVOLVING MULTIPLE JOINTS: ICD-10-CM

## 2018-12-11 DIAGNOSIS — Z79.4 CONTROLLED TYPE 2 DIABETES MELLITUS WITHOUT COMPLICATION, WITH LONG-TERM CURRENT USE OF INSULIN (HCC): Primary | ICD-10-CM

## 2018-12-11 DIAGNOSIS — Z23 NEED FOR VACCINATION: ICD-10-CM

## 2018-12-11 DIAGNOSIS — F32.1 CURRENT MODERATE EPISODE OF MAJOR DEPRESSIVE DISORDER WITHOUT PRIOR EPISODE (HCC): ICD-10-CM

## 2018-12-11 DIAGNOSIS — K42.9 UMBILICAL HERNIA WITHOUT OBSTRUCTION AND WITHOUT GANGRENE: ICD-10-CM

## 2018-12-11 DIAGNOSIS — I10 ESSENTIAL HYPERTENSION WITH GOAL BLOOD PRESSURE LESS THAN 130/85: ICD-10-CM

## 2018-12-11 DIAGNOSIS — L98.9 SKIN LESION OF RIGHT ARM: ICD-10-CM

## 2018-12-11 DIAGNOSIS — E78.2 MIXED HYPERLIPIDEMIA: ICD-10-CM

## 2018-12-11 DIAGNOSIS — H25.13 AGE-RELATED NUCLEAR CATARACT OF BOTH EYES: ICD-10-CM

## 2018-12-11 DIAGNOSIS — E66.01 MORBID OBESITY WITH BODY MASS INDEX (BMI) OF 50.0 TO 59.9 IN ADULT (HCC): ICD-10-CM

## 2018-12-11 DIAGNOSIS — E11.9 CONTROLLED TYPE 2 DIABETES MELLITUS WITHOUT COMPLICATION, WITH LONG-TERM CURRENT USE OF INSULIN (HCC): Primary | ICD-10-CM

## 2018-12-11 DIAGNOSIS — G47.33 OSA (OBSTRUCTIVE SLEEP APNEA): ICD-10-CM

## 2018-12-11 DIAGNOSIS — Z12.5 SCREENING FOR PROSTATE CANCER: ICD-10-CM

## 2018-12-11 PROBLEM — N23 URETERAL COLIC: Status: RESOLVED | Noted: 2017-02-22 | Resolved: 2018-12-11

## 2018-12-11 PROCEDURE — G0439 PPPS, SUBSEQ VISIT: HCPCS | Performed by: INTERNAL MEDICINE

## 2018-12-11 PROCEDURE — G0008 ADMIN INFLUENZA VIRUS VAC: HCPCS | Performed by: INTERNAL MEDICINE

## 2018-12-11 PROCEDURE — 90686 IIV4 VACC NO PRSV 0.5 ML IM: CPT | Performed by: INTERNAL MEDICINE

## 2018-12-11 PROCEDURE — 96160 PT-FOCUSED HLTH RISK ASSMT: CPT | Performed by: INTERNAL MEDICINE

## 2018-12-11 RX ORDER — FLUTICASONE PROPIONATE 50 MCG
2 SPRAY, SUSPENSION (ML) NASAL
COMMUNITY
Start: 2013-03-15 | End: 2021-12-13

## 2018-12-11 RX ORDER — ECHINACEA PURPUREA EXTRACT 125 MG
2 TABLET ORAL AS NEEDED
COMMUNITY

## 2018-12-19 PROBLEM — E11.00 UNCONTROLLED TYPE 2 DIABETES MELLITUS WITH HYPEROSMOLAR NONKETOTIC HYPERGLYCEMIA (HCC): Status: RESOLVED | Noted: 2017-11-29 | Resolved: 2018-12-19

## 2018-12-19 PROBLEM — Z79.4 CONTROLLED TYPE 2 DIABETES MELLITUS WITHOUT COMPLICATION, WITH LONG-TERM CURRENT USE OF INSULIN (HCC): Status: ACTIVE | Noted: 2017-11-29

## 2018-12-19 PROBLEM — E11.9 TYPE 2 DIABETES MELLITUS WITHOUT COMPLICATION (HCC): Status: ACTIVE | Noted: 2017-11-29

## 2018-12-19 PROBLEM — F33.41 DEPRESSION, MAJOR, RECURRENT, IN PARTIAL REMISSION: Status: ACTIVE | Noted: 2018-12-19

## 2018-12-19 PROBLEM — F33.41 DEPRESSION, MAJOR, RECURRENT, IN PARTIAL REMISSION (HCC): Status: ACTIVE | Noted: 2018-12-19

## 2018-12-19 PROBLEM — E11.9 CONTROLLED TYPE 2 DIABETES MELLITUS WITHOUT COMPLICATION, WITH LONG-TERM CURRENT USE OF INSULIN (HCC): Status: ACTIVE | Noted: 2017-11-29

## 2018-12-19 NOTE — PROGRESS NOTES
REASON FOR VISIT:    Jerardo Llamas is a 46year old male who presents for a Medicare Annual Wellness visit.      Patient Care Team: Patient Care Team:  Ijeoma Cisse MD as PCP - General (Internal Medicine)  Oksana Gudino MD as PCP - Jessy Tillman, Subcutaneous Solution Pen-injector Inject 50 units in the morning and 70 units at night.  Disp: 45 mL Rfl: 2   insulin detemir (LEVEMIR FLEXPEN) 100 UNIT/ML Subcutaneous Solution Pen-injector Inject 50 units in the morning and 70 units at night Disp: 45 mL Units 4/5/2018 4/4/2018 3/1/2018 1/19/2018 12/1/2017 11/30/2017 11/29/2017   Glucose 70 - 99 mg/dL - 97 127(H) - 195(H) 255(H) 668(HH)   HbA1c 4.3 - 5.6 % 6. 2(A) - - 9. 0(A) - - -     Cholesterol Latest Ref Rng & Units 4/4/2018 7/2/2017 1/6/2015   Total Cho medications as prescribed: Able without help  Are you able to afford your medications?: Yes  Hearing Problems?: No     Functional Status     Hearing Problems?: No  Vision Problems? : No  Difficulty walking?: No  Difficulty dressing or bathing?: No  Problem Lab or Procedure     Diabetes     HgbA1C  Annually HEMOGLOBIN A1C (%)   Date Value   04/05/2018 6.2 (A)       Creat/alb ratio  Annually Creatinine (mg/dL)   Date Value   04/04/2018 1.20     CREATININE (P) (mg/dL)   Date Value   07/16/2015 1.07       LDL  A FIBROMIALGIA   • Glaucoma Neg      Immunization History      Immunization History  Administered            Date(s) Administered    FLULAVAL 6 months & older 0.5 ml Prefilled syringe (18903)                          12/11/2018      FLUZONE 3 Yrs+ Otoniel Ayala back is not tender, FROM of the back  EXTREMITIES: no cyanosis, clubbing or edema  NEURO: Oriented times three, cranial nerves are intact, motor and sensory are grossly intact  FOOT: normal exam    ASSESSMENT AND OTHER RELEVANT CHRONIC CONDITIONS:   Alissa Oats the fall yearly. Follow up quickly if Upper respiratory symptoms start    5. Gastrointestinal stromal tumor (GIST) of stomach ([facility]    Had tumor operated on and follow up shows no residual tumor. Co=ndition is stable    6.  Current moderate episode of major condition as needed. 13. Age-related nuclear cataract of both eyes    Currently stable condition. Follow up with specialist monitoring condition as needed.     12. Screening for prostate cancer    Needs a PSA blood test.    17. Need for vaccination     P

## 2018-12-26 PROBLEM — E66.01 MORBID OBESITY WITH BODY MASS INDEX (BMI) OF 50.0 TO 59.9 IN ADULT (HCC): Status: ACTIVE | Noted: 2017-12-11

## 2018-12-26 PROBLEM — F32.1 CURRENT MODERATE EPISODE OF MAJOR DEPRESSIVE DISORDER WITHOUT PRIOR EPISODE (HCC): Status: ACTIVE | Noted: 2018-12-26

## 2018-12-26 RX ORDER — LISINOPRIL AND HYDROCHLOROTHIAZIDE 25; 20 MG/1; MG/1
1 TABLET ORAL DAILY
Qty: 90 TABLET | Refills: 0 | Status: SHIPPED | OUTPATIENT
Start: 2018-12-26 | End: 2019-03-28

## 2018-12-26 NOTE — TELEPHONE ENCOUNTER
Refill passed per [facility] protocol.       Hypertensive Medications  Protocol Criteria:  · Appointment scheduled in the past 6 months or in the next 3 months  · BMP or CMP in the past 12 months  · Creatinine result < 2  Recent Outpatient Visits

## 2019-02-06 RX ORDER — INSULIN LISPRO 100 [IU]/ML
INJECTION, SOLUTION INTRAVENOUS; SUBCUTANEOUS
Qty: 45 ML | Refills: 2 | Status: SHIPPED | OUTPATIENT
Start: 2019-02-06 | End: 2019-05-14

## 2019-02-06 NOTE — TELEPHONE ENCOUNTER
LOV 04/05/2018 - RTC IN 3 MONTHS (AROUND July 2018), NO SCHEDULED F/U. Will send Werdsmitht message.

## 2019-02-20 ENCOUNTER — PATIENT MESSAGE (OUTPATIENT)
Dept: INTERNAL MEDICINE CLINIC | Facility: CLINIC | Age: 54
End: 2019-02-20

## 2019-02-20 ENCOUNTER — PATIENT MESSAGE (OUTPATIENT)
Dept: OTHER | Age: 54
End: 2019-02-20

## 2019-02-20 DIAGNOSIS — Z79.4 CONTROLLED TYPE 2 DIABETES MELLITUS WITHOUT COMPLICATION, WITH LONG-TERM CURRENT USE OF INSULIN (HCC): Primary | ICD-10-CM

## 2019-02-20 DIAGNOSIS — E11.9 CONTROLLED TYPE 2 DIABETES MELLITUS WITHOUT COMPLICATION, WITH LONG-TERM CURRENT USE OF INSULIN (HCC): Primary | ICD-10-CM

## 2019-03-01 NOTE — TELEPHONE ENCOUNTER
Pt is requesting a referral to Dr. Alberto Kendall (Oncology General Surgery) at the [address] [id number]. Pt needs to have a stomach wall tumor removed.  Pt has an appt with Dr. Cris Gatica on Tuesday 02/28 at 10:00am [Restricted in physically strenuous activity but ambulatory and able to carry out work of a light or sedentary nature] : Status 1- Restricted in physically strenuous activity but ambulatory and able to carry out work of a light or sedentary nature, e.g., light house work, office work [Normal] : normal appearance, no rash, nodules, vesicles, ulcers, erythema [de-identified] : PPM [de-identified] : trace edema in Lower extremities on Lasix, improved from last visit [de-identified] :  right arm scar [de-identified] : dementia, incontinence, ambulation difficulties but stable

## 2019-03-19 ENCOUNTER — NURSE TRIAGE (OUTPATIENT)
Dept: INTERNAL MEDICINE CLINIC | Facility: CLINIC | Age: 54
End: 2019-03-19

## 2019-03-19 NOTE — TELEPHONE ENCOUNTER
Action Requested: Summary for Provider     []  Critical Lab, Recommendations Needed  [] Need Additional Advice  []   FYI    []   Need Orders  [] Need Medications Sent to Pharmacy  []  Other     SUMMARY: patient reports intermittent cough, productive clear

## 2019-03-20 ENCOUNTER — OFFICE VISIT (OUTPATIENT)
Dept: INTERNAL MEDICINE CLINIC | Facility: CLINIC | Age: 54
End: 2019-03-20
Payer: COMMERCIAL

## 2019-03-20 VITALS
BODY MASS INDEX: 45.1 KG/M2 | TEMPERATURE: 98 F | DIASTOLIC BLOOD PRESSURE: 72 MMHG | WEIGHT: 315 LBS | SYSTOLIC BLOOD PRESSURE: 126 MMHG | HEIGHT: 70 IN | HEART RATE: 103 BPM

## 2019-03-20 DIAGNOSIS — R05.9 COUGH: Primary | ICD-10-CM

## 2019-03-20 PROCEDURE — G0463 HOSPITAL OUTPT CLINIC VISIT: HCPCS | Performed by: PHYSICIAN ASSISTANT

## 2019-03-20 PROCEDURE — 99213 OFFICE O/P EST LOW 20 MIN: CPT | Performed by: PHYSICIAN ASSISTANT

## 2019-03-20 RX ORDER — MONTELUKAST SODIUM 10 MG/1
10 TABLET ORAL NIGHTLY
Qty: 30 TABLET | Refills: 2 | Status: SHIPPED | OUTPATIENT
Start: 2019-03-20 | End: 2019-05-15

## 2019-03-20 NOTE — PROGRESS NOTES
HPI:    Patient ID: Cole Beckford is a 53year old male. HPI   Patient here with cough for the last 4-5 weeks. At onset he had flu like symptoms including body aches, nausea, diarrhea, and cough for about 24 hours.  He felt much better but the cough ne Suspension 2 sprays.  Disp:  Rfl:    AMLODIPINE BESYLATE 5 MG Oral Tab TAKE 1 TABLET(5 MG) BY MOUTH DAILY Disp: 90 tablet Rfl: 0   ATORVASTATIN 20 MG Oral Tab TAKE 1 TABLET(20 MG) BY MOUTH EVERY NIGHT Disp: 90 tablet Rfl: 0   HUMALOG KWIKPEN 100 UNIT/ML Sub Chew Tab Chew 1 tablet (125 mg total) by mouth every 6 (six) hours as needed for FLATULENCE. Disp: 80 tablet Rfl: 0     Allergies:No Known Allergies   PHYSICAL EXAM:   Physical Exam   Nursing note and vitals reviewed.    Constitutional: He appears well-deve 2     Sig: Take 1 tablet (10 mg total) by mouth nightly.        Imaging & Referrals:  None         IE#[id number]

## 2019-03-26 ENCOUNTER — TELEPHONE (OUTPATIENT)
Dept: GASTROENTEROLOGY | Facility: CLINIC | Age: 54
End: 2019-03-26

## 2019-03-26 NOTE — TELEPHONE ENCOUNTER
Kelsea Toledo, This pt was originally seen in consult w/ Dr. Levi Galan in Feb. 2017 during hospitalization. His upcoming appt is for CLN screening.  If the pt would like to continue care w/ Dr. Levi Galan, then I would recommend his appt be moved either w/ Dr. Levi Galan or Jennifer

## 2019-03-28 RX ORDER — ATORVASTATIN CALCIUM 20 MG/1
TABLET, FILM COATED ORAL
Qty: 90 TABLET | Refills: 0 | Status: SHIPPED | OUTPATIENT
Start: 2019-03-28 | End: 2019-05-15

## 2019-03-28 RX ORDER — AMLODIPINE BESYLATE 5 MG/1
TABLET ORAL
Qty: 90 TABLET | Refills: 0 | Status: SHIPPED | OUTPATIENT
Start: 2019-03-28 | End: 2019-05-15

## 2019-03-28 RX ORDER — LISINOPRIL AND HYDROCHLOROTHIAZIDE 25; 20 MG/1; MG/1
1 TABLET ORAL DAILY
Qty: 90 TABLET | Refills: 0 | Status: SHIPPED | OUTPATIENT
Start: 2019-03-28 | End: 2019-05-15

## 2019-03-29 RX ORDER — METFORMIN HYDROCHLORIDE 500 MG/1
TABLET, EXTENDED RELEASE ORAL
Qty: 60 TABLET | Refills: 0 | Status: SHIPPED | OUTPATIENT
Start: 2019-03-29 | End: 2019-05-08

## 2019-04-01 RX ORDER — METFORMIN HYDROCHLORIDE 500 MG/1
TABLET, EXTENDED RELEASE ORAL
Qty: 180 TABLET | Refills: 0 | OUTPATIENT
Start: 2019-04-01

## 2019-04-01 NOTE — TELEPHONE ENCOUNTER
Noted, thank you.   Future Appointments   Date Time Provider Singh Melendez   5/8/2019  8:30 AM Sue Tovar PA-C [facility]

## 2019-04-05 ENCOUNTER — PATIENT OUTREACH (OUTPATIENT)
Dept: CASE MANAGEMENT | Age: 54
End: 2019-04-05

## 2019-04-18 ENCOUNTER — TELEPHONE (OUTPATIENT)
Dept: INTERNAL MEDICINE CLINIC | Facility: CLINIC | Age: 54
End: 2019-04-18

## 2019-04-18 NOTE — TELEPHONE ENCOUNTER
Patient's spouse dropped off application for patient. She states she spoke to a nurse yesterday (4/17) but no encounter. Please complete and call patient when ready for pickup.

## 2019-04-26 ENCOUNTER — OFFICE VISIT (OUTPATIENT)
Dept: INTERNAL MEDICINE CLINIC | Facility: CLINIC | Age: 54
End: 2019-04-26
Payer: COMMERCIAL

## 2019-04-26 ENCOUNTER — APPOINTMENT (OUTPATIENT)
Dept: LAB | Age: 54
End: 2019-04-26
Attending: INTERNAL MEDICINE
Payer: MEDICARE

## 2019-04-26 VITALS
SYSTOLIC BLOOD PRESSURE: 105 MMHG | DIASTOLIC BLOOD PRESSURE: 69 MMHG | RESPIRATION RATE: 20 BRPM | WEIGHT: 315 LBS | HEART RATE: 98 BPM | HEIGHT: 70 IN | OXYGEN SATURATION: 95 % | BODY MASS INDEX: 45.1 KG/M2 | TEMPERATURE: 98 F

## 2019-04-26 DIAGNOSIS — I10 ESSENTIAL HYPERTENSION WITH GOAL BLOOD PRESSURE LESS THAN 130/85: ICD-10-CM

## 2019-04-26 DIAGNOSIS — Z79.4 CONTROLLED TYPE 2 DIABETES MELLITUS WITHOUT COMPLICATION, WITH LONG-TERM CURRENT USE OF INSULIN (HCC): ICD-10-CM

## 2019-04-26 DIAGNOSIS — E11.9 CONTROLLED TYPE 2 DIABETES MELLITUS WITHOUT COMPLICATION, WITH LONG-TERM CURRENT USE OF INSULIN (HCC): ICD-10-CM

## 2019-04-26 DIAGNOSIS — G47.33 OSA (OBSTRUCTIVE SLEEP APNEA): ICD-10-CM

## 2019-04-26 DIAGNOSIS — Z79.4 CONTROLLED TYPE 2 DIABETES MELLITUS WITHOUT COMPLICATION, WITH LONG-TERM CURRENT USE OF INSULIN (HCC): Primary | ICD-10-CM

## 2019-04-26 DIAGNOSIS — E11.9 CONTROLLED TYPE 2 DIABETES MELLITUS WITHOUT COMPLICATION, WITH LONG-TERM CURRENT USE OF INSULIN (HCC): Primary | ICD-10-CM

## 2019-04-26 DIAGNOSIS — E78.2 MIXED HYPERLIPIDEMIA: ICD-10-CM

## 2019-04-26 PROCEDURE — G0463 HOSPITAL OUTPT CLINIC VISIT: HCPCS | Performed by: INTERNAL MEDICINE

## 2019-04-26 PROCEDURE — 82043 UR ALBUMIN QUANTITATIVE: CPT

## 2019-04-26 PROCEDURE — 83036 HEMOGLOBIN GLYCOSYLATED A1C: CPT

## 2019-04-26 PROCEDURE — 99214 OFFICE O/P EST MOD 30 MIN: CPT | Performed by: INTERNAL MEDICINE

## 2019-04-26 PROCEDURE — 36415 COLL VENOUS BLD VENIPUNCTURE: CPT

## 2019-04-26 PROCEDURE — 82570 ASSAY OF URINE CREATININE: CPT

## 2019-04-26 NOTE — PROGRESS NOTES
Binh Pruitt is a 53year old male. HPI:   1.  Type 2 diabetes mellitus without complication, without long-term current use of insulin ([facility]    The patient has been taking all prescribed diabetic medications at home and has been following a diabeti kg)  04/05/18 : (!) 407 lb 12.8 oz (185 kg)  03/01/18 : (!) 394 lb (178.7 kg)  01/19/18 : (!) 386 lb (175.1 kg)        Current Outpatient Medications:  METFORMIN HCL  MG Oral Tablet 24 Hr TAKE 1 TABLET(500 MG) BY MOUTH TWICE DAILY WITH MEALS Disp: 60 to test blood sugar 3-4 times daily. OK to substitute to preferred brand.  Disp: 200 each Rfl: 2   Glucose Blood (FREESTYLE TEST) In Vitro Strip Check 3-4 times daily Disp: 150 strip Rfl: 0   Lancet Device Does not apply Misc Test blood sugars ac and hs Dis of breath with exertion  CARDIOVASCULAR: denies chest pain on exertion  GI: denies abdominal pain and denies heartburn  NEURO: denies headaches    EXAM:   /69 (BP Location: Right arm, Patient Position: Sitting, Cuff Size: adult)   Pulse 98   Temp 98. fat, low cholesterol diet as discussed. Discussed lifestyle modifications including reductions in dietary total and saturated fat and eating a diet rich in fruits and vegetables.  Discussed decreasing alcohol consumption Try to exercise at least 3 times wee

## 2019-05-01 ENCOUNTER — TELEPHONE (OUTPATIENT)
Dept: INTERNAL MEDICINE CLINIC | Facility: CLINIC | Age: 54
End: 2019-05-01

## 2019-05-01 DIAGNOSIS — R06.83 SNORING: Primary | ICD-10-CM

## 2019-05-01 DIAGNOSIS — G47.30 SLEEP APNEA, UNSPECIFIED TYPE: ICD-10-CM

## 2019-05-01 NOTE — TELEPHONE ENCOUNTER
Helen Barakat from [address] is asking to change to order to diagnostic split night. Please advise, thank you.          Please call mago when order has been changed

## 2019-05-02 NOTE — TELEPHONE ENCOUNTER
Clinical doesn't met criteria for CPAP titration for the following reasons:    No prior PSG on file, no documentation for CPAP compliance, no documentation of follow up with Board certified Sleep Physician or other Pulmonologist since diagnoses of CAMACHO and no [place] Sleepiness score. As a result, a home test can be performed as it doesn't require PA or, patient can be referred to a Board certified Sleep Physician or other Pulmonologist for management of CAMACHO.

## 2019-05-06 NOTE — PROGRESS NOTES
[facility] Follow-up Visit    Kandy infection 2/2017    Esophagitis; on diflucan   • Degenerative joint disease    • Depression    • Diabetes [facility])    • Gastrointestinal stromal tumor (GIST) ([facility] 2/2017    Gastric 7 cm GIST. Dx incidentally after CT scan, EUS/FNA.     • Helicobacter positive LISINOPRIL-HYDROCHLOROTHIAZIDE 20-25 MG Oral Tab TAKE 1 TABLET BY MOUTH DAILY Disp: 90 tablet Rfl: 0   Montelukast Sodium 10 MG Oral Tab Take 1 tablet (10 mg total) by mouth nightly.  Disp: 30 tablet Rfl: 2   HUMALOG KWIKPEN 100 UNIT/ML Subcutaneous Solut congestion.  Disp:  Rfl:    insulin detemir (LEVEMIR FLEXPEN) 100 UNIT/ML Subcutaneous Solution Pen-injector Inject 50 units in the morning and 70 units at night Disp: 45 mL Rfl: 2   Imatinib Mesylate 400 MG Oral Tab Take 1 tablet (400 mg total) by mouth da sarcoidosis, HTN, HLD, morbid obesity, allergies and GI hx of gastric GIST (EGD/EUS in 2017 with subsequent robotic partial gastrectomy), H. Pylori infection and candida esophagitis (asx on 4/2017 EGD with Dr. Asher Zhao).  He presents for colonoscopy screening Ag, EIA      Meds This Visit:  Requested Prescriptions     Signed Prescriptions Disp Refills   • PEG 3350-KCl-Na Bicarb-NaCl (TRILYTE) 420 g Oral Recon Soln 1 Bottle 0     Sig: Split dose preparation - take as directed.        Imaging & Referrals:  None

## 2019-05-08 ENCOUNTER — OFFICE VISIT (OUTPATIENT)
Dept: GASTROENTEROLOGY | Facility: CLINIC | Age: 54
End: 2019-05-08
Payer: COMMERCIAL

## 2019-05-08 ENCOUNTER — TELEPHONE (OUTPATIENT)
Dept: GASTROENTEROLOGY | Facility: CLINIC | Age: 54
End: 2019-05-08

## 2019-05-08 VITALS
SYSTOLIC BLOOD PRESSURE: 141 MMHG | DIASTOLIC BLOOD PRESSURE: 89 MMHG | BODY MASS INDEX: 47.19 KG/M2 | HEIGHT: 68.5 IN | WEIGHT: 315 LBS | HEART RATE: 90 BPM

## 2019-05-08 DIAGNOSIS — Z86.19 HISTORY OF HELICOBACTER PYLORI INFECTION: ICD-10-CM

## 2019-05-08 DIAGNOSIS — Z12.11 COLON CANCER SCREENING: ICD-10-CM

## 2019-05-08 DIAGNOSIS — Z86.19 HX OF HELICOBACTER INFECTION: ICD-10-CM

## 2019-05-08 DIAGNOSIS — Z85.09 HISTORY OF GASTROINTESTINAL STROMAL TUMOR (GIST): ICD-10-CM

## 2019-05-08 DIAGNOSIS — Z85.09 H/O MALIGNANT GASTROINTESTINAL STROMAL TUMOR (GIST): Primary | ICD-10-CM

## 2019-05-08 DIAGNOSIS — Z12.11 ENCOUNTER FOR SCREENING COLONOSCOPY: Primary | ICD-10-CM

## 2019-05-08 PROCEDURE — G0463 HOSPITAL OUTPT CLINIC VISIT: HCPCS | Performed by: PHYSICIAN ASSISTANT

## 2019-05-08 PROCEDURE — 99214 OFFICE O/P EST MOD 30 MIN: CPT | Performed by: PHYSICIAN ASSISTANT

## 2019-05-08 RX ORDER — POLYETHYLENE GLYCOL 3350, SODIUM CHLORIDE, SODIUM BICARBONATE, POTASSIUM CHLORIDE 420; 11.2; 5.72; 1.48 G/4L; G/4L; G/4L; G/4L
POWDER, FOR SOLUTION ORAL
Qty: 1 BOTTLE | Refills: 0 | Status: ON HOLD | OUTPATIENT
Start: 2019-05-08 | End: 2019-06-19 | Stop reason: ALTCHOICE

## 2019-05-08 NOTE — TELEPHONE ENCOUNTER
Please obtain insulin orders for this patient from Dr. Russell Nichole for his CLN with MAC with Dr. Antonio Payne

## 2019-05-08 NOTE — TELEPHONE ENCOUNTER
I spoke with Von Valente at [facility] [phone] and she states no prior authorization is needed for colonoscopy done at [facility]  Ref # Radha A 05/08/19

## 2019-05-08 NOTE — PATIENT INSTRUCTIONS
1. Schedule colonoscopy with Dr. Jennie Benitez with MAC anesthesia @ [facility]. 2.  bowel prep from pharmacy - I have prescribed Trilyte split dose preparation    3.  Medication Changes:  - Diabetic insulin medications per Dr. Bernie Andino - I have sent her a message

## 2019-05-08 NOTE — TELEPHONE ENCOUNTER
Patient is scheduled for a colonoscopy on 06/19/19 with Dr. Endy Sanchez. Patient will be on a clear liquid diet the day before (lunch and dinner) and NPO 3 hours prior to procedure the day of procedure. Please provide diabetic orders.  Thank you  Message sent t

## 2019-05-08 NOTE — TELEPHONE ENCOUNTER
Scheduled for:  Colonoscopy [address]33  Provider Name: Tracee Ivania  Date:  6/19/19  Location: [facility]   Sedation:  Mac  Time:  0900 / Arrival 0800  Prep:Split dose Trilyte  Meds/Allergies Reconciled?: Physician reviewed   Diagnosis with codes:  Colon cancer screening

## 2019-05-08 NOTE — TELEPHONE ENCOUNTER
MARIBELL;Rn's  Pt needs Prior authorization at [facility] for his procedure due to High BMI 60.38 on 6/19/19 for Colonoscopy .   Thank you

## 2019-05-09 NOTE — TELEPHONE ENCOUNTER
Pt contacted and reviewed Dr. Stanley Cleary orders below, pt verbalized understanding and requested instructions to be sent to him via [employer] for his review. Prep instructions sent. No further questions at this time.

## 2019-05-09 NOTE — TELEPHONE ENCOUNTER
On the day of prep in the morning take Levemir 40 units SQ QAM, please hold all doses of Humalog on day of prep, the night before CLN take Levemir 35 units SQ QHS. No insulin in the morning before procedure. Thanks.

## 2019-05-14 ENCOUNTER — TELEPHONE (OUTPATIENT)
Dept: ENDOCRINOLOGY CLINIC | Facility: CLINIC | Age: 54
End: 2019-05-14

## 2019-05-14 RX ORDER — METFORMIN HYDROCHLORIDE 500 MG/1
500 TABLET, EXTENDED RELEASE ORAL 2 TIMES DAILY WITH MEALS
Qty: 180 TABLET | Refills: 0 | Status: SHIPPED | OUTPATIENT
Start: 2019-05-14 | End: 2020-03-10

## 2019-05-14 RX ORDER — INSULIN LISPRO 100 [IU]/ML
INJECTION, SOLUTION INTRAVENOUS; SUBCUTANEOUS
Qty: 45 ML | Refills: 0 | Status: SHIPPED | OUTPATIENT
Start: 2019-05-14 | End: 2019-07-08

## 2019-05-14 NOTE — TELEPHONE ENCOUNTER
Follow up scheduled 7/18/2019. Per [facility] protocol can refill through upcoming appointment.

## 2019-05-14 NOTE — TELEPHONE ENCOUNTER
Current Outpatient Medications:  HUMALOG KWIKPEN 100 UNIT/ML Subcutaneous Solution Pen-injector Administer 15 units SQ three times a day with meals Disp: 45 mL Rfl: 2   insulin glargine (LANTUS SOLOSTAR) 100 UNIT/ML Subcutaneous Solution Pen-injector Inj

## 2019-05-15 NOTE — TELEPHONE ENCOUNTER
Current Outpatient Medications:   •  AMLODIPINE BESYLATE 5 MG Oral Tab, TAKE 1 TABLET(5 MG) BY MOUTH DAILY, Disp: 90 tablet, Rfl: 0  •  ATORVASTATIN 20 MG Oral Tab, TAKE 1 TABLET(20 MG) BY MOUTH EVERY NIGHT, Disp: 90 tablet, Rfl: 0  •  LISINOPRIL-HYDROCH

## 2019-05-16 RX ORDER — LISINOPRIL AND HYDROCHLOROTHIAZIDE 25; 20 MG/1; MG/1
1 TABLET ORAL DAILY
Qty: 90 TABLET | Refills: 1 | Status: SHIPPED | OUTPATIENT
Start: 2019-05-16 | End: 2019-10-01

## 2019-05-16 RX ORDER — AMLODIPINE BESYLATE 5 MG/1
TABLET ORAL
Qty: 90 TABLET | Refills: 1 | Status: SHIPPED | OUTPATIENT
Start: 2019-05-16 | End: 2019-10-01

## 2019-05-16 RX ORDER — ATORVASTATIN CALCIUM 20 MG/1
TABLET, FILM COATED ORAL
Qty: 90 TABLET | Refills: 1 | Status: SHIPPED | OUTPATIENT
Start: 2019-05-16 | End: 2019-11-01

## 2019-05-16 RX ORDER — MONTELUKAST SODIUM 10 MG/1
10 TABLET ORAL NIGHTLY
Qty: 90 TABLET | Refills: 1 | Status: SHIPPED | OUTPATIENT
Start: 2019-05-16 | End: 2021-12-13

## 2019-05-16 NOTE — TELEPHONE ENCOUNTER
Please review; protocol failed.     Requested Prescriptions     Pending Prescriptions Disp Refills   • amLODIPine Besylate 5 MG Oral Tab 90 tablet 0     Sig: TAKE 1 TABLET(5 MG) BY MOUTH DAILY   • atorvastatin 20 MG Oral Tab 90 tablet 0     Sig: TAKE 1 TABL 17-Mar-2019 22:16

## 2019-05-21 ENCOUNTER — PATIENT OUTREACH (OUTPATIENT)
Dept: CASE MANAGEMENT | Age: 54
End: 2019-05-21

## 2019-06-07 ENCOUNTER — TELEPHONE (OUTPATIENT)
Dept: GASTROENTEROLOGY | Facility: CLINIC | Age: 54
End: 2019-06-07

## 2019-06-10 ENCOUNTER — APPOINTMENT (OUTPATIENT)
Dept: LAB | Facility: HOSPITAL | Age: 54
End: 2019-06-10
Attending: PHYSICIAN ASSISTANT
Payer: MEDICARE

## 2019-06-10 PROCEDURE — 87338 HPYLORI STOOL AG IA: CPT | Performed by: PHYSICIAN ASSISTANT

## 2019-06-19 ENCOUNTER — ANESTHESIA (OUTPATIENT)
Dept: ENDOSCOPY | Facility: HOSPITAL | Age: 54
End: 2019-06-19
Payer: MEDICARE

## 2019-06-19 ENCOUNTER — TELEPHONE (OUTPATIENT)
Dept: GASTROENTEROLOGY | Facility: CLINIC | Age: 54
End: 2019-06-19

## 2019-06-19 ENCOUNTER — HOSPITAL ENCOUNTER (OUTPATIENT)
Facility: HOSPITAL | Age: 54
Setting detail: HOSPITAL OUTPATIENT SURGERY
Discharge: HOME | End: 2019-06-19
Attending: INTERNAL MEDICINE | Admitting: INTERNAL MEDICINE
Payer: MEDICARE

## 2019-06-19 ENCOUNTER — ANESTHESIA EVENT (OUTPATIENT)
Dept: ENDOSCOPY | Facility: HOSPITAL | Age: 54
End: 2019-06-19
Payer: MEDICARE

## 2019-06-19 VITALS
HEIGHT: 70 IN | DIASTOLIC BLOOD PRESSURE: 71 MMHG | BODY MASS INDEX: 45.1 KG/M2 | OXYGEN SATURATION: 95 % | RESPIRATION RATE: 22 BRPM | WEIGHT: 315 LBS | HEART RATE: 81 BPM | SYSTOLIC BLOOD PRESSURE: 107 MMHG

## 2019-06-19 DIAGNOSIS — K57.90 DIVERTICULOSIS: ICD-10-CM

## 2019-06-19 DIAGNOSIS — Z12.11 COLON CANCER SCREENING: ICD-10-CM

## 2019-06-19 DIAGNOSIS — Z86.19 HX OF HELICOBACTER INFECTION: ICD-10-CM

## 2019-06-19 DIAGNOSIS — K64.9 HEMORRHOIDS, UNSPECIFIED HEMORRHOID TYPE: ICD-10-CM

## 2019-06-19 DIAGNOSIS — D12.0 ADENOMATOUS POLYP OF CECUM: Primary | ICD-10-CM

## 2019-06-19 DIAGNOSIS — K63.5 POLYP OF SIGMOID COLON, UNSPECIFIED TYPE: ICD-10-CM

## 2019-06-19 DIAGNOSIS — Z85.09 H/O MALIGNANT GASTROINTESTINAL STROMAL TUMOR (GIST): ICD-10-CM

## 2019-06-19 PROCEDURE — 0DBN8ZX EXCISION OF SIGMOID COLON, VIA NATURAL OR ARTIFICIAL OPENING ENDOSCOPIC, DIAGNOSTIC: ICD-10-PCS | Performed by: INTERNAL MEDICINE

## 2019-06-19 PROCEDURE — 45385 COLONOSCOPY W/LESION REMOVAL: CPT | Performed by: INTERNAL MEDICINE

## 2019-06-19 PROCEDURE — 0DBH8ZX EXCISION OF CECUM, VIA NATURAL OR ARTIFICIAL OPENING ENDOSCOPIC, DIAGNOSTIC: ICD-10-PCS | Performed by: INTERNAL MEDICINE

## 2019-06-19 RX ORDER — SODIUM CHLORIDE, SODIUM LACTATE, POTASSIUM CHLORIDE, CALCIUM CHLORIDE 600; 310; 30; 20 MG/100ML; MG/100ML; MG/100ML; MG/100ML
INJECTION, SOLUTION INTRAVENOUS CONTINUOUS
Status: CANCELLED | OUTPATIENT
Start: 2019-06-19

## 2019-06-19 RX ORDER — NALOXONE HYDROCHLORIDE 0.4 MG/ML
80 INJECTION, SOLUTION INTRAMUSCULAR; INTRAVENOUS; SUBCUTANEOUS AS NEEDED
Status: CANCELLED | OUTPATIENT
Start: 2019-06-19 | End: 2019-06-19

## 2019-06-19 RX ORDER — SODIUM CHLORIDE, SODIUM LACTATE, POTASSIUM CHLORIDE, CALCIUM CHLORIDE 600; 310; 30; 20 MG/100ML; MG/100ML; MG/100ML; MG/100ML
INJECTION, SOLUTION INTRAVENOUS CONTINUOUS
Status: DISCONTINUED | OUTPATIENT
Start: 2019-06-19 | End: 2019-06-19

## 2019-06-19 RX ORDER — LIDOCAINE HYDROCHLORIDE 10 MG/ML
INJECTION, SOLUTION EPIDURAL; INFILTRATION; INTRACAUDAL; PERINEURAL AS NEEDED
Status: DISCONTINUED | OUTPATIENT
Start: 2019-06-19 | End: 2019-06-19 | Stop reason: SURG

## 2019-06-19 RX ORDER — DEXTROSE MONOHYDRATE 25 G/50ML
50 INJECTION, SOLUTION INTRAVENOUS
Status: CANCELLED | OUTPATIENT
Start: 2019-06-19

## 2019-06-19 RX ORDER — PANTOPRAZOLE SODIUM 20 MG/1
20 TABLET, DELAYED RELEASE ORAL
Qty: 30 TABLET | Refills: 2 | Status: SHIPPED | OUTPATIENT
Start: 2019-06-19 | End: 2019-07-19

## 2019-06-19 RX ADMIN — SODIUM CHLORIDE, SODIUM LACTATE, POTASSIUM CHLORIDE, CALCIUM CHLORIDE: 600; 310; 30; 20 INJECTION, SOLUTION INTRAVENOUS at 09:33:00

## 2019-06-19 RX ADMIN — SODIUM CHLORIDE, SODIUM LACTATE, POTASSIUM CHLORIDE, CALCIUM CHLORIDE: 600; 310; 30; 20 INJECTION, SOLUTION INTRAVENOUS at 09:01:00

## 2019-06-19 RX ADMIN — LIDOCAINE HYDROCHLORIDE 25 MG: 10 INJECTION, SOLUTION EPIDURAL; INFILTRATION; INTRACAUDAL; PERINEURAL at 09:06:00

## 2019-06-19 NOTE — ANESTHESIA PREPROCEDURE EVALUATION
Anesthesia PreOp Note    HPI:     Ace Sacks is a 53year old male who presents for preoperative consultation requested by: Sophia Gregorio MD    Date of Surgery: 6/19/2019    Procedure(s):  COLONOSCOPY  Indication: H/O malignant gastrointestinal s • Anxiety state, unspecified    • Arthritis    • Candida infection 2/2017    Esophagitis; on diflucan   • COPD (chronic obstructive pulmonary disease) (HCC)    • Degenerative joint disease    • Depression    • [facility]    • Disorder of thyroid    • G insulin glargine (LANTUS SOLOSTAR) 100 UNIT/ML Subcutaneous Solution Pen-injector Inject 50 units in the morning and 70 units at night.  Disp: 120 mL Rfl: 0 6/18/2019 at 2100   metFORMIN HCl  MG Oral Tablet 24 Hr Take 1 tablet (500 mg total) by mouth simethicone 125 MG Oral Chew Tab Chew 1 tablet (125 mg total) by mouth every 6 (six) hours as needed for FLATULENCE.  Disp: 80 tablet Rfl: 0 Taking       Current Facility-Administered Medications Ordered in Epic:  lactated ringers infusion  Intravenous Cont Fear of current or ex partner: Not on file        Emotionally abused: Not on file        Physically abused: Not on file        Forced sexual activity: Not on file    Other Topics      Concerns:         Service: Not Asked        Blood Transfus I have informed Yvette Jolly and/or legal guardian or family member of the nature of the anesthetic plan, benefits, risks including possible dental damage if relevant, major complications, and any alternative forms of anesthetic management.    All of t

## 2019-06-19 NOTE — H&P
History & Physical Examination    Patient Name: Ace Sacks  MRN: [id number]  [facility]: [id number]  YOB: 1965    Diagnosis: screening for colon cancer      Medications Prior to Admission:  Oxymetazoline HCl (VICKS SINEX) 0.05 % Nasal Solutio preferred brand meter and supplies. Disp: 1 kit Rfl: 0 Taking   BD ULTRA-FINE LANCETS Does not apply Misc Use to test blood sugar 3-4 times daily. OK to substitute to preferred brand.  Disp: 200 each Rfl: 2 Taking   Glucose Blood (FREESTYLE TEST) In Vitro S ULTRASOUND (EUS) N/A 2/22/2017    Performed by Davina Guzmán MD at [address] [place] [address] ENDOSCOPY   • ESOPHAGOGASTRODUODENOSCOPY (EGD) N/A 2/22/2017    Performed by Davina Guzmán MD at [address] [place] [address] ENDOSCOPY   • OTHER      LYMPH NODE BIOPSY NOSE   • ROBOT-ASSISTED LAPAROSCOPI

## 2019-06-19 NOTE — ANESTHESIA POSTPROCEDURE EVALUATION
Patient: Patti Ann    Procedure Summary     Date:  06/19/19 Room / Location:  [facility] ENDOSCOPY 01 / [facility] ENDOSCOPY    Anesthesia Start:  0901 Anesthesia Stop:      Procedure:  COLONOSCOPY (N/A ) Diagnosis:       H/O malignant gastrointestinal stromal t

## 2019-06-19 NOTE — OPERATIVE REPORT
COLONOSCOPY REPORT    Andre Mccann     1965 Age 53year old   PCP Leonce Seip, MD Endoscopist Perla Aguirre MD     Date of procedure: 19    Procedure: Colonoscopy w/hot snare polypectomy    Pre-operative diagnosis: Screen polypectomy and retrieved. 2. Diverticulosis: mild in the sigmoid. 3. Terminal ileum: the visualized mucosa appeared normal.    4. A retroflexed view of the rectum revealed small internal hemorrhoids.     5. The colonic mucosa throughout the colon sh

## 2019-06-25 ENCOUNTER — TELEPHONE (OUTPATIENT)
Dept: GASTROENTEROLOGY | Facility: CLINIC | Age: 54
End: 2019-06-25

## 2019-06-25 NOTE — TELEPHONE ENCOUNTER
I mailed out colonoscopy results letter to pt  Updated health maintenance  Entered into 3 yr CLN recall  Recall colon in 3 years per.  Colon done 6/19/19    repeat your colonoscopy in 10 years that is in June 2029

## 2019-06-27 RX ORDER — METFORMIN HYDROCHLORIDE 500 MG/1
TABLET, EXTENDED RELEASE ORAL
Qty: 180 TABLET | Refills: 0 | Status: SHIPPED | OUTPATIENT
Start: 2019-06-27 | End: 2019-10-01

## 2019-06-27 RX ORDER — METFORMIN HYDROCHLORIDE 500 MG/1
TABLET, EXTENDED RELEASE ORAL
Qty: 60 TABLET | Refills: 0 | Status: SHIPPED | OUTPATIENT
Start: 2019-06-27 | End: 2019-06-27

## 2019-07-08 RX ORDER — INSULIN LISPRO 100 [IU]/ML
INJECTION, SOLUTION INTRAVENOUS; SUBCUTANEOUS
Qty: 45 ML | Refills: 0 | Status: SHIPPED | OUTPATIENT
Start: 2019-07-08 | End: 2019-07-23

## 2019-07-08 RX ORDER — ATORVASTATIN CALCIUM 20 MG/1
TABLET, FILM COATED ORAL
Qty: 90 TABLET | Refills: 1 | OUTPATIENT
Start: 2019-07-08

## 2019-07-09 NOTE — TELEPHONE ENCOUNTER
Should have refill    atorvastatin 20 MG Oral Tab 90 tablet 1 5/16/2019     Sig: TAKE 1 TABLET(20 MG) BY MOUTH EVERY NIGHT    Sent to pharmacy as:  Atorvastatin Calcium 20 MG Oral Tablet    E-Prescribing Status: Receipt confirmed by pharmacy (5/16/2019 12:16 PM CDT)    Pharmacy     [address] [place] [address], [address] [address] [place] [address] [phone], [phone]

## 2019-07-18 ENCOUNTER — OFFICE VISIT (OUTPATIENT)
Dept: ENDOCRINOLOGY CLINIC | Facility: CLINIC | Age: 54
End: 2019-07-18
Payer: COMMERCIAL

## 2019-07-18 ENCOUNTER — TELEPHONE (OUTPATIENT)
Dept: ENDOCRINOLOGY CLINIC | Facility: CLINIC | Age: 54
End: 2019-07-18

## 2019-07-18 ENCOUNTER — OFFICE VISIT (OUTPATIENT)
Dept: INTERNAL MEDICINE CLINIC | Facility: CLINIC | Age: 54
End: 2019-07-18
Payer: COMMERCIAL

## 2019-07-18 VITALS
DIASTOLIC BLOOD PRESSURE: 76 MMHG | TEMPERATURE: 98 F | RESPIRATION RATE: 20 BRPM | HEIGHT: 68.5 IN | SYSTOLIC BLOOD PRESSURE: 139 MMHG | WEIGHT: 315 LBS | HEART RATE: 98 BPM | BODY MASS INDEX: 47.19 KG/M2

## 2019-07-18 VITALS
DIASTOLIC BLOOD PRESSURE: 81 MMHG | HEART RATE: 88 BPM | SYSTOLIC BLOOD PRESSURE: 139 MMHG | BODY MASS INDEX: 62 KG/M2 | WEIGHT: 315 LBS

## 2019-07-18 DIAGNOSIS — K42.9 UMBILICAL HERNIA WITHOUT OBSTRUCTION AND WITHOUT GANGRENE: ICD-10-CM

## 2019-07-18 DIAGNOSIS — J34.89 NASAL OBSTRUCTION: ICD-10-CM

## 2019-07-18 DIAGNOSIS — I10 ESSENTIAL HYPERTENSION WITH GOAL BLOOD PRESSURE LESS THAN 130/85: ICD-10-CM

## 2019-07-18 DIAGNOSIS — F32.1 CURRENT MODERATE EPISODE OF MAJOR DEPRESSIVE DISORDER WITHOUT PRIOR EPISODE (HCC): ICD-10-CM

## 2019-07-18 DIAGNOSIS — M17.0 PRIMARY OSTEOARTHRITIS OF BOTH KNEES: ICD-10-CM

## 2019-07-18 DIAGNOSIS — H52.4 PRESBYOPIA: ICD-10-CM

## 2019-07-18 DIAGNOSIS — Z00.00 PHYSICAL EXAM, ANNUAL: Primary | ICD-10-CM

## 2019-07-18 DIAGNOSIS — D12.3 ADENOMATOUS POLYP OF TRANSVERSE COLON: ICD-10-CM

## 2019-07-18 DIAGNOSIS — J44.9 ASTHMA WITH COPD (CHRONIC OBSTRUCTIVE PULMONARY DISEASE) (HCC): ICD-10-CM

## 2019-07-18 DIAGNOSIS — D69.6 THROMBOCYTOPENIA (HCC): ICD-10-CM

## 2019-07-18 DIAGNOSIS — G47.33 OSA (OBSTRUCTIVE SLEEP APNEA): ICD-10-CM

## 2019-07-18 DIAGNOSIS — H25.13 AGE-RELATED NUCLEAR CATARACT OF BOTH EYES: ICD-10-CM

## 2019-07-18 DIAGNOSIS — IMO0001 UNCONTROLLED TYPE 2 DIABETES MELLITUS WITHOUT COMPLICATION, WITH LONG-TERM CURRENT USE OF INSULIN: Primary | ICD-10-CM

## 2019-07-18 DIAGNOSIS — IMO0001 UNCONTROLLED TYPE 2 DIABETES MELLITUS WITHOUT COMPLICATION, WITH LONG-TERM CURRENT USE OF INSULIN: ICD-10-CM

## 2019-07-18 DIAGNOSIS — E78.2 MIXED HYPERLIPIDEMIA: ICD-10-CM

## 2019-07-18 DIAGNOSIS — C49.A2 GASTROINTESTINAL STROMAL TUMOR (GIST) OF STOMACH (HCC): ICD-10-CM

## 2019-07-18 DIAGNOSIS — Z12.5 SCREENING FOR PROSTATE CANCER: ICD-10-CM

## 2019-07-18 PROBLEM — F33.41 DEPRESSION, MAJOR, RECURRENT, IN PARTIAL REMISSION: Status: RESOLVED | Noted: 2018-12-19 | Resolved: 2019-07-18

## 2019-07-18 PROBLEM — F33.41 DEPRESSION, MAJOR, RECURRENT, IN PARTIAL REMISSION (HCC): Status: RESOLVED | Noted: 2018-12-19 | Resolved: 2019-07-18

## 2019-07-18 PROBLEM — Z23 NEED FOR VACCINATION: Status: RESOLVED | Noted: 2018-12-11 | Resolved: 2019-07-18

## 2019-07-18 LAB
CARTRIDGE LOT#: ABNORMAL NUMERIC
GLUCOSE BLOOD: 173
HEMOGLOBIN A1C: 10.4 % (ref 4.3–5.6)
TEST STRIP LOT #: NORMAL NUMERIC

## 2019-07-18 PROCEDURE — 83036 HEMOGLOBIN GLYCOSYLATED A1C: CPT | Performed by: INTERNAL MEDICINE

## 2019-07-18 PROCEDURE — 99214 OFFICE O/P EST MOD 30 MIN: CPT | Performed by: INTERNAL MEDICINE

## 2019-07-18 PROCEDURE — 99396 PREV VISIT EST AGE 40-64: CPT | Performed by: INTERNAL MEDICINE

## 2019-07-18 PROCEDURE — 96160 PT-FOCUSED HLTH RISK ASSMT: CPT | Performed by: INTERNAL MEDICINE

## 2019-07-18 PROCEDURE — G0439 PPPS, SUBSEQ VISIT: HCPCS | Performed by: INTERNAL MEDICINE

## 2019-07-18 PROCEDURE — 36416 COLLJ CAPILLARY BLOOD SPEC: CPT | Performed by: INTERNAL MEDICINE

## 2019-07-18 PROCEDURE — 82962 GLUCOSE BLOOD TEST: CPT | Performed by: INTERNAL MEDICINE

## 2019-07-18 NOTE — PROGRESS NOTES
Name: Boo Capellan  Date: 7/18/2019    Referring Physician: Nova Sandoval    HISTORY OF PRESENT ILLNESS   Boo Capellan is a 53year old male who presents for diabetes mellitus, he was recently seen during hospitalization for uncontrolled hyp MG) BY MOUTH DAILY, Disp: 90 tablet, Rfl: 1  •  atorvastatin 20 MG Oral Tab, TAKE 1 TABLET(20 MG) BY MOUTH EVERY NIGHT, Disp: 90 tablet, Rfl: 1  •  Lisinopril-hydroCHLOROthiazide 20-25 MG Oral Tab, Take 1 tablet by mouth daily. , Disp: 90 tablet, Rfl: 1  • Take 2 tablets (650 mg total) by mouth every 6 (six) hours as needed. , Disp: 80 tablet, Rfl: 0  •  simethicone 125 MG Oral Chew Tab, Chew 1 tablet (125 mg total) by mouth every 6 (six) hours as needed for FLATULENCE., Disp: 80 tablet, Rfl: 0     Allergies: • OTHER      LYMPH NODE BIOPSY NOSE   • ROBOT-ASSISTED LAPAROSCOPIC PARTIAL GASTRECTOMY N/A 4/4/2017    Performed by John Tejeda MD at [facility] OR   • UPPER GI ENDOSCOPY,EXAM           PHYSICAL EXAM  /81   Pulse 88   Wt (!) 411 lb 6.4 oz (186.6 MD

## 2019-07-18 NOTE — PROGRESS NOTES
REASON FOR VISIT:    Chon Felipe is a 53year old male who presents for a Medicare Annual Wellness visit.     Patient Care Team: Patient Care Team:  Loco Pandey MD as PCP - General (Internal Medicine)  Radha Islas MD as PCP - Rubi Lopez 1   Montelukast Sodium 10 MG Oral Tab Take 1 tablet (10 mg total) by mouth nightly. Disp: 90 tablet Rfl: 1   insulin glargine (LANTUS SOLOSTAR) 100 UNIT/ML Subcutaneous Solution Pen-injector Inject 50 units in the morning and 70 units at night.  Disp: 120 m 11/29/2017   Glucose 70 - 99 mg/dL - 97 127(H) - 195(H) 255(H) 668(HH)   HbA1c 4.3 - 5.6 % 6. 2(A) - - 9. 0(A) - - -     Cholesterol Latest Ref Rng & Units 4/4/2018 7/2/2017 1/6/2015   Total Cholesterol 110 - 200 mg/dL 142 194 181   Triglycerides 1 - 149 mg/ Able without help  Are you able to afford your medications?: (P) No  Hearing Problems?: (P) No     Functional Status     Hearing Problems?: (P) No  Vision Problems? : (P) Yes  Difficulty walking?: (P) Yes  Difficulty dressing or bathing?: (P) No  Problems necessary    Colorectal Cancer Screening     Colonoscopy Screen every 10 years Colonoscopy due on 06/19/2022    Flex Sigmoidoscopy Screen every 5 years No results found for this or any previous visit.     Fecal Occult Blood Annually No results found for: FO [facility] about 4-5 yrs ago   • Unspecified sleep apnea 2/1/2012    NOT USING CPAP      Past Surgical History:   Procedure Laterality Date   • COLONOSCOPY N/A 6/19/2019    Performed by Ruben Andrea MD at [address] [place] [address] ENDOSCOPY   • ENDOSCOPIC ULTRASOUND (EUS) N/A history  ALL/ASTHMA: denies hx of allergy or asthma    EXAM:   /76 (BP Location: Left arm, Patient Position: Sitting, Cuff Size: large)   Pulse 98   Temp 97.9 °F (36.6 °C) (Oral)   Resp 20   Ht 5' 8.5\" (1.74 m)   Wt (!) 412 lb (186.9 kg)   BMI 61.73 recheck of  diabetes.  Diabetic control is not that good  Recommendations are: continue present meds, check HgbA1C, check fasting lipids and CMP, lose wgt with carbohydrate controlled diet and exercise, refer to Ophthalmology, check feet daily.    -     SHERLEY Glucosamine chondroitin 500/400 can be tried OTC (over the counter) twice daily as well. -     ORTHOPEDIC - INTERNAL    8.  Essential hypertension with goal blood pressure less than 130/85    Patient instructed to take anti-hypertensive medicines exactly PANEL  -     TSH W REFLEX TO FREE T4    13. Age-related nuclear cataract of both eyes    Currently stable condition. Follow up with specialist monitoring condition as needed. 14. Presbyopia    Currently stable condition.  Follow up with specialist khushbu

## 2019-07-23 ENCOUNTER — TELEPHONE (OUTPATIENT)
Dept: ENDOCRINOLOGY CLINIC | Facility: CLINIC | Age: 54
End: 2019-07-23

## 2019-07-23 RX ORDER — PEN NEEDLE, DIABETIC 31 G X1/4"
NEEDLE, DISPOSABLE MISCELLANEOUS
Qty: 200 EACH | Refills: 0 | Status: SHIPPED | OUTPATIENT
Start: 2019-07-23

## 2019-07-23 NOTE — TELEPHONE ENCOUNTER
Current Outpatient Medications:  Insulin NPH & Regular (NOVOLIN 70/30 FLEXPEN RELION) (70-30) 100 UNIT/ML Subcutaneous Suspension Pen-injector Inject 80 Units into the skin 2 (two) times daily.  Disp: 30 mL Rfl: 5     PA request pls call [phone] ID#

## 2019-07-24 RX ORDER — AMLODIPINE BESYLATE 5 MG/1
TABLET ORAL
Qty: 90 TABLET | Refills: 0 | Status: SHIPPED | OUTPATIENT
Start: 2019-07-24 | End: 2019-10-29

## 2019-07-24 RX ORDER — LISINOPRIL AND HYDROCHLOROTHIAZIDE 25; 20 MG/1; MG/1
1 TABLET ORAL DAILY
Qty: 90 TABLET | Refills: 0 | Status: SHIPPED | OUTPATIENT
Start: 2019-07-24 | End: 2019-10-29

## 2019-07-24 RX ORDER — ATORVASTATIN CALCIUM 20 MG/1
TABLET, FILM COATED ORAL
Qty: 90 TABLET | Refills: 0 | Status: SHIPPED | OUTPATIENT
Start: 2019-07-24 | End: 2019-10-01

## 2019-08-21 ENCOUNTER — OFFICE VISIT (OUTPATIENT)
Dept: SLEEP CENTER | Age: 54
End: 2019-08-21
Attending: INTERNAL MEDICINE
Payer: MEDICARE

## 2019-08-21 DIAGNOSIS — G47.33 OSA (OBSTRUCTIVE SLEEP APNEA): Primary | ICD-10-CM

## 2019-08-21 PROCEDURE — 95806 SLEEP STUDY UNATT&RESP EFFT: CPT

## 2019-08-23 NOTE — PROCEDURES
[facility]  Accredited by the [place] of Sleep [employer] (AASM)    PATIENT'S NAME: Ebonyjose francisco Armendariz   ATTENDING PHYSICIAN: Luiza Cisse MD   REFERRING PHYSICIAN:    PATIENT ACCOUNT #: [de-identified] LOCATION: Sleep Center   MED 1. CPAP titration. 2.   Weight loss. 3.   Avoid alcohol. 4.   Avoid sedating drug. 5.   Patient should not drive if at all sleepy. Please do not hesitate to contact me if there is any question whatsoever regarding interpretation of this study.

## 2019-08-28 ENCOUNTER — TELEPHONE (OUTPATIENT)
Dept: OTHER | Age: 54
End: 2019-08-28

## 2019-08-28 NOTE — TELEPHONE ENCOUNTER
Notes recorded by Roger SRudy [place] DAVID Alan on 8/26/2019 at 11:44 AM CDT  Patient has significant sleep apnea as seen on recent sleep test and will require CPAP titration to determine if a CPAP machine will help patient achieve restful sleep and at what settings

## 2019-08-30 ENCOUNTER — OFFICE VISIT (OUTPATIENT)
Dept: OTOLARYNGOLOGY | Facility: CLINIC | Age: 54
End: 2019-08-30
Payer: COMMERCIAL

## 2019-08-30 VITALS
SYSTOLIC BLOOD PRESSURE: 103 MMHG | BODY MASS INDEX: 62 KG/M2 | DIASTOLIC BLOOD PRESSURE: 67 MMHG | TEMPERATURE: 98 F | WEIGHT: 315 LBS

## 2019-08-30 DIAGNOSIS — J34.2 DEVIATED SEPTUM: Primary | ICD-10-CM

## 2019-08-30 PROCEDURE — 99203 OFFICE O/P NEW LOW 30 MIN: CPT | Performed by: OTOLARYNGOLOGY

## 2019-08-30 PROCEDURE — G0463 HOSPITAL OUTPT CLINIC VISIT: HCPCS | Performed by: OTOLARYNGOLOGY

## 2019-08-30 RX ORDER — AZELASTINE 1 MG/ML
2 SPRAY, METERED NASAL 2 TIMES DAILY
Qty: 1 BOTTLE | Refills: 0 | Status: SHIPPED | OUTPATIENT
Start: 2019-08-30 | End: 2020-02-18

## 2019-08-30 NOTE — PROGRESS NOTES
Willam Stephens is a 53year old male. Patient presents with:  Nose Problem: pt presents with nasal congestion for many yrs     HPI:   Unable to breathe through his nose for several years. Dx with CAMACHO and will have CPAP ordered again.  Has been using afri Does not apply Kit Use to test blood sugar 3-4 times daily. OK to substitute to preferred brand meter and supplies.  Disp: 1 kit Rfl: 0   Glucose Blood (FREESTYLE TEST) In Vitro Strip Check 3-4 times daily Disp: 150 strip Rfl: 0   Lancet Device Does not ct Social History:  Social History    Tobacco Use      Smoking status: Never Smoker      Smokeless tobacco: Never Used    Alcohol use: Yes      Comment: Occasionally    Drug use: No       REVIEW OF SYSTEMS:   GENERAL HEALTH: feels well otherwise  GENERAL :

## 2019-10-01 ENCOUNTER — OFFICE VISIT (OUTPATIENT)
Dept: SURGERY | Facility: CLINIC | Age: 54
End: 2019-10-01
Payer: COMMERCIAL

## 2019-10-01 VITALS
HEART RATE: 103 BPM | WEIGHT: 315 LBS | HEIGHT: 68.5 IN | OXYGEN SATURATION: 94 % | SYSTOLIC BLOOD PRESSURE: 155 MMHG | DIASTOLIC BLOOD PRESSURE: 93 MMHG | BODY MASS INDEX: 47.19 KG/M2

## 2019-10-01 DIAGNOSIS — IMO0001 IDDM (INSULIN DEPENDENT DIABETES MELLITUS): Primary | ICD-10-CM

## 2019-10-01 DIAGNOSIS — E66.01 MORBID OBESITY WITH BMI OF 60.0-69.9, ADULT (HCC): ICD-10-CM

## 2019-10-01 DIAGNOSIS — M15.9 PRIMARY OSTEOARTHRITIS INVOLVING MULTIPLE JOINTS: ICD-10-CM

## 2019-10-01 DIAGNOSIS — G47.33 OSA (OBSTRUCTIVE SLEEP APNEA): ICD-10-CM

## 2019-10-01 DIAGNOSIS — I10 ESSENTIAL HYPERTENSION WITH GOAL BLOOD PRESSURE LESS THAN 130/85: ICD-10-CM

## 2019-10-01 PROCEDURE — 99204 OFFICE O/P NEW MOD 45 MIN: CPT | Performed by: INTERNAL MEDICINE

## 2019-10-01 NOTE — PROGRESS NOTES
The Wellness and Weight Loss Consultation Note       Patient:  Charlotte Melissa  :      1965  MRN:      [id number]    Referring Provider: Dr. Shakir Mccray       Chief Complaint:  Patient presents with:  Consult  Weight Management      SUBJECTIVE 413 lb (187.3 kg)   SpO2 94%   BMI 61.88 kg/m²      Patient Medications:      Current Outpatient Medications:  Azelastine HCl 0.1 % Nasal Solution 2 sprays by Nasal route 2 (two) times daily.  Disp: 1 Bottle Rfl: 0   LISINOPRIL-HYDROCHLOROTHIAZIDE 20-25 MG 325 MG Oral Tab Take 2 tablets (650 mg total) by mouth every 6 (six) hours as needed. Disp: 80 tablet Rfl: 0   Oxymetazoline HCl (MUCINEX NASAL SPRAY FULL FORCE) 0.05 % Nasal Solution 1 spray by Nasal route nightly as needed for congestion.  Disp:  Rfl: Concerns:         Service: Not Asked        Blood Transfusions: Not Asked        Caffeine Concern: Yes          2-3 cup of tea daily        Occupational Exposure: Not Asked        Hobby Hazards: Not Asked        Sleep Concern: Not Asked        S or vegetables daily    Behavior Modifications Reviewed and Discussed:    Eat breakfast, Eat 3 meals per day, Plan meals in advance, Read nutrition labels, Drink 64oz of water per day, Maintain a daily food journal, No drinking 30 minutes before or after me HYPERTENSION:  The patient's blood pressure has been well controlled. he has been checking it as instructed and has remained in relatively good control.         Encounter Diagnosis(ses):   IDDM (insulin dependent diabetes mellitus) [facility]  (primary enco

## 2019-11-01 ENCOUNTER — PATIENT MESSAGE (OUTPATIENT)
Dept: OTHER | Age: 54
End: 2019-11-01

## 2019-11-01 RX ORDER — ATORVASTATIN CALCIUM 20 MG/1
TABLET, FILM COATED ORAL
Qty: 90 TABLET | Refills: 0 | Status: SHIPPED | OUTPATIENT
Start: 2019-11-01 | End: 2020-02-04

## 2019-11-01 RX ORDER — AMLODIPINE BESYLATE 5 MG/1
TABLET ORAL
Qty: 90 TABLET | Refills: 0 | Status: SHIPPED | OUTPATIENT
Start: 2019-11-01 | End: 2020-04-04

## 2019-11-01 RX ORDER — LISINOPRIL AND HYDROCHLOROTHIAZIDE 25; 20 MG/1; MG/1
1 TABLET ORAL DAILY
Qty: 90 TABLET | Refills: 0 | Status: SHIPPED | OUTPATIENT
Start: 2019-11-01 | End: 2020-02-04

## 2019-11-01 NOTE — TELEPHONE ENCOUNTER
Please review; protocol failed. Pt due for labs.    My chart message sent to pt requesting to complete labs    Request also sent to provider for review

## 2019-11-01 NOTE — TELEPHONE ENCOUNTER
Hypertensive Medications  Protocol Criteria:  · Appointment scheduled in the past 6 months or in the next 3 months  · BMP or CMP in the past 12 months  · Creatinine result < 2  Recent Outpatient Visits            1 month ago IDDM (insulin dependent diabete

## 2020-01-30 ENCOUNTER — NURSE TRIAGE (OUTPATIENT)
Dept: INTERNAL MEDICINE CLINIC | Facility: CLINIC | Age: 55
End: 2020-01-30

## 2020-01-30 DIAGNOSIS — E11.9 TYPE 2 DIABETES MELLITUS WITHOUT COMPLICATION, WITHOUT LONG-TERM CURRENT USE OF INSULIN (HCC): Primary | ICD-10-CM

## 2020-01-30 NOTE — TELEPHONE ENCOUNTER
On-site RN, please call patient and triage regarding MyChart message copied here. MyChart message response sent in original MyChart encounter, per department process. ----- Message from GUÉRET.  Ynaiv Calamity sent at 1/29/2020  2:15 PM CST -----  Regarding: R

## 2020-01-31 NOTE — TELEPHONE ENCOUNTER
Action Requested: Summary for Provider     []  Critical Lab, Recommendations Needed  [x] Need Additional Advice  []   FYI    [x]   Need Orders  [] Need Medications Sent to Pharmacy  []  Other     SUMMARY:  Per protocol advised OV within 2 weeks. Pt agreed.

## 2020-02-03 ENCOUNTER — APPOINTMENT (OUTPATIENT)
Dept: LAB | Facility: HOSPITAL | Age: 55
End: 2020-02-03
Attending: INTERNAL MEDICINE
Payer: MEDICARE

## 2020-02-03 LAB
ALBUMIN SERPL-MCNC: 4 G/DL (ref 3.4–5)
ALBUMIN/GLOB SERPL: 1 {RATIO} (ref 1–2)
ALP LIVER SERPL-CCNC: 117 U/L (ref 45–117)
ALT SERPL-CCNC: 49 U/L (ref 16–61)
ANION GAP SERPL CALC-SCNC: 9 MMOL/L (ref 0–18)
AST SERPL-CCNC: 29 U/L (ref 15–37)
BASOPHILS # BLD AUTO: 0.06 X10(3) UL (ref 0–0.2)
BASOPHILS NFR BLD AUTO: 0.9 %
BILIRUB SERPL-MCNC: 0.5 MG/DL (ref 0.1–2)
BILIRUB UR QL: NEGATIVE
BUN BLD-MCNC: 10 MG/DL (ref 7–18)
BUN/CREAT SERPL: 8.9 (ref 10–20)
CALCIUM BLD-MCNC: 9.6 MG/DL (ref 8.5–10.1)
CHLORIDE SERPL-SCNC: 105 MMOL/L (ref 98–112)
CHOLEST SMN-MCNC: 160 MG/DL (ref ?–200)
CLARITY UR: CLEAR
CO2 SERPL-SCNC: 25 MMOL/L (ref 21–32)
COLOR UR: YELLOW
CREAT BLD-MCNC: 1.12 MG/DL (ref 0.7–1.3)
CREAT UR-SCNC: 300 MG/DL
DEPRECATED RDW RBC AUTO: 44.7 FL (ref 35.1–46.3)
EOSINOPHIL # BLD AUTO: 0.12 X10(3) UL (ref 0–0.7)
EOSINOPHIL NFR BLD AUTO: 1.7 %
ERYTHROCYTE [DISTWIDTH] IN BLOOD BY AUTOMATED COUNT: 14.8 % (ref 11–15)
EST. AVERAGE GLUCOSE BLD GHB EST-MCNC: 258 MG/DL (ref 68–126)
GLOBULIN PLAS-MCNC: 3.9 G/DL (ref 2.8–4.4)
GLUCOSE BLD-MCNC: 151 MG/DL (ref 70–99)
GLUCOSE UR-MCNC: NEGATIVE MG/DL
HBA1C MFR BLD HPLC: 10.6 % (ref ?–5.7)
HCT VFR BLD AUTO: 45.1 % (ref 39–53)
HDLC SERPL-MCNC: 47 MG/DL (ref 40–59)
HGB BLD-MCNC: 14.5 G/DL (ref 13–17.5)
HYALINE CASTS #/AREA URNS AUTO: 1 /LPF
IMM GRANULOCYTES # BLD AUTO: 0.02 X10(3) UL (ref 0–1)
IMM GRANULOCYTES NFR BLD: 0.3 %
KETONES UR-MCNC: NEGATIVE MG/DL
LDLC SERPL CALC-MCNC: 101 MG/DL (ref ?–100)
LEUKOCYTE ESTERASE UR QL STRIP.AUTO: NEGATIVE
LYMPHOCYTES # BLD AUTO: 1.77 X10(3) UL (ref 1–4)
LYMPHOCYTES NFR BLD AUTO: 25.8 %
M PROTEIN MFR SERPL ELPH: 7.9 G/DL (ref 6.4–8.2)
MCH RBC QN AUTO: 26.5 PG (ref 26–34)
MCHC RBC AUTO-ENTMCNC: 32.2 G/DL (ref 31–37)
MCV RBC AUTO: 82.4 FL (ref 80–100)
MICROALBUMIN UR-MCNC: 142 MG/DL
MICROALBUMIN/CREAT 24H UR-RTO: 473.3 UG/MG (ref ?–30)
MONOCYTES # BLD AUTO: 0.59 X10(3) UL (ref 0.1–1)
MONOCYTES NFR BLD AUTO: 8.6 %
NEUTROPHILS # BLD AUTO: 4.31 X10 (3) UL (ref 1.5–7.7)
NEUTROPHILS # BLD AUTO: 4.31 X10(3) UL (ref 1.5–7.7)
NEUTROPHILS NFR BLD AUTO: 62.7 %
NITRITE UR QL STRIP.AUTO: NEGATIVE
NONHDLC SERPL-MCNC: 113 MG/DL (ref ?–130)
OSMOLALITY SERPL CALC.SUM OF ELEC: 290 MOSM/KG (ref 275–295)
PATIENT FASTING Y/N/NP: YES
PATIENT FASTING Y/N/NP: YES
PH UR: 5 [PH] (ref 5–8)
PLATELET # BLD AUTO: 232 10(3)UL (ref 150–450)
POTASSIUM SERPL-SCNC: 4 MMOL/L (ref 3.5–5.1)
PROT UR-MCNC: 100 MG/DL
RBC # BLD AUTO: 5.47 X10(6)UL (ref 4.3–5.7)
RBC #/AREA URNS AUTO: 1 /HPF
SODIUM SERPL-SCNC: 139 MMOL/L (ref 136–145)
SP GR UR STRIP: 1.03 (ref 1–1.03)
TRIGL SERPL-MCNC: 60 MG/DL (ref 30–149)
TSI SER-ACNC: 1.76 MIU/ML (ref 0.36–3.74)
UROBILINOGEN UR STRIP-ACNC: <2
VLDLC SERPL CALC-MCNC: 12 MG/DL (ref 0–30)
WBC # BLD AUTO: 6.9 X10(3) UL (ref 4–11)
WBC #/AREA URNS AUTO: 1 /HPF

## 2020-02-03 PROCEDURE — 80061 LIPID PANEL: CPT | Performed by: INTERNAL MEDICINE

## 2020-02-03 PROCEDURE — 80053 COMPREHEN METABOLIC PANEL: CPT | Performed by: INTERNAL MEDICINE

## 2020-02-03 PROCEDURE — 83036 HEMOGLOBIN GLYCOSYLATED A1C: CPT | Performed by: INTERNAL MEDICINE

## 2020-02-03 PROCEDURE — 82570 ASSAY OF URINE CREATININE: CPT | Performed by: INTERNAL MEDICINE

## 2020-02-03 PROCEDURE — 81001 URINALYSIS AUTO W/SCOPE: CPT | Performed by: INTERNAL MEDICINE

## 2020-02-03 PROCEDURE — 82043 UR ALBUMIN QUANTITATIVE: CPT | Performed by: INTERNAL MEDICINE

## 2020-02-03 PROCEDURE — 84443 ASSAY THYROID STIM HORMONE: CPT | Performed by: INTERNAL MEDICINE

## 2020-02-03 PROCEDURE — 85025 COMPLETE CBC W/AUTO DIFF WBC: CPT | Performed by: INTERNAL MEDICINE

## 2020-02-03 PROCEDURE — 36415 COLL VENOUS BLD VENIPUNCTURE: CPT | Performed by: INTERNAL MEDICINE

## 2020-02-05 ENCOUNTER — APPOINTMENT (OUTPATIENT)
Dept: CT IMAGING | Facility: HOSPITAL | Age: 55
End: 2020-02-05
Attending: EMERGENCY MEDICINE
Payer: MEDICARE

## 2020-02-05 ENCOUNTER — APPOINTMENT (OUTPATIENT)
Dept: GENERAL RADIOLOGY | Facility: HOSPITAL | Age: 55
End: 2020-02-05
Payer: MEDICARE

## 2020-02-05 ENCOUNTER — APPOINTMENT (OUTPATIENT)
Dept: NUCLEAR MEDICINE | Facility: HOSPITAL | Age: 55
End: 2020-02-05
Attending: INTERNAL MEDICINE
Payer: MEDICARE

## 2020-02-05 ENCOUNTER — APPOINTMENT (OUTPATIENT)
Dept: CV DIAGNOSTICS | Facility: HOSPITAL | Age: 55
End: 2020-02-05
Attending: INTERNAL MEDICINE
Payer: MEDICARE

## 2020-02-05 ENCOUNTER — HOSPITAL ENCOUNTER (OUTPATIENT)
Facility: HOSPITAL | Age: 55
Setting detail: OBSERVATION
Discharge: HOME | End: 2020-02-06
Attending: EMERGENCY MEDICINE | Admitting: HOSPITALIST
Payer: MEDICARE

## 2020-02-05 DIAGNOSIS — R00.2 PALPITATIONS: ICD-10-CM

## 2020-02-05 DIAGNOSIS — R06.00 DYSPNEA ON EXERTION: Primary | ICD-10-CM

## 2020-02-05 PROBLEM — R06.09 DYSPNEA ON EXERTION: Status: ACTIVE | Noted: 2020-02-05

## 2020-02-05 LAB
ANION GAP SERPL CALC-SCNC: 6 MMOL/L (ref 0–18)
BASOPHILS # BLD AUTO: 0.05 X10(3) UL (ref 0–0.2)
BASOPHILS NFR BLD AUTO: 0.7 %
BILIRUB UR QL: NEGATIVE
BUN BLD-MCNC: 11 MG/DL (ref 7–18)
BUN/CREAT SERPL: 9.2 (ref 10–20)
CALCIUM BLD-MCNC: 9 MG/DL (ref 8.5–10.1)
CHLORIDE SERPL-SCNC: 107 MMOL/L (ref 98–112)
CLARITY UR: CLEAR
CO2 SERPL-SCNC: 27 MMOL/L (ref 21–32)
COLOR UR: YELLOW
CREAT BLD-MCNC: 1.19 MG/DL (ref 0.7–1.3)
D DIMER PPP FEU-MCNC: 0.78 UG/ML FEU (ref ?–0.55)
DEPRECATED RDW RBC AUTO: 44.2 FL (ref 35.1–46.3)
EOSINOPHIL # BLD AUTO: 0.05 X10(3) UL (ref 0–0.7)
EOSINOPHIL NFR BLD AUTO: 0.7 %
ERYTHROCYTE [DISTWIDTH] IN BLOOD BY AUTOMATED COUNT: 14.9 % (ref 11–15)
GLUCOSE BLD-MCNC: 214 MG/DL (ref 70–99)
GLUCOSE BLDC GLUCOMTR-MCNC: 117 MG/DL (ref 70–99)
GLUCOSE BLDC GLUCOMTR-MCNC: 150 MG/DL (ref 70–99)
GLUCOSE BLDC GLUCOMTR-MCNC: 165 MG/DL (ref 70–99)
GLUCOSE BLDC GLUCOMTR-MCNC: 166 MG/DL (ref 70–99)
GLUCOSE UR-MCNC: NEGATIVE MG/DL
HCT VFR BLD AUTO: 42 % (ref 39–53)
HGB BLD-MCNC: 13.3 G/DL (ref 13–17.5)
HGB UR QL STRIP.AUTO: NEGATIVE
IMM GRANULOCYTES # BLD AUTO: 0.02 X10(3) UL (ref 0–1)
IMM GRANULOCYTES NFR BLD: 0.3 %
KETONES UR-MCNC: NEGATIVE MG/DL
LEUKOCYTE ESTERASE UR QL STRIP.AUTO: NEGATIVE
LYMPHOCYTES # BLD AUTO: 1.11 X10(3) UL (ref 1–4)
LYMPHOCYTES NFR BLD AUTO: 15 %
MCH RBC QN AUTO: 25.7 PG (ref 26–34)
MCHC RBC AUTO-ENTMCNC: 31.7 G/DL (ref 31–37)
MCV RBC AUTO: 81.2 FL (ref 80–100)
MONOCYTES # BLD AUTO: 0.5 X10(3) UL (ref 0.1–1)
MONOCYTES NFR BLD AUTO: 6.8 %
NEUTROPHILS # BLD AUTO: 5.67 X10 (3) UL (ref 1.5–7.7)
NEUTROPHILS # BLD AUTO: 5.67 X10(3) UL (ref 1.5–7.7)
NEUTROPHILS NFR BLD AUTO: 76.5 %
NITRITE UR QL STRIP.AUTO: NEGATIVE
NT-PROBNP SERPL-MCNC: 25 PG/ML (ref ?–125)
OSMOLALITY SERPL CALC.SUM OF ELEC: 296 MOSM/KG (ref 275–295)
PH UR: 5 [PH] (ref 5–8)
PLATELET # BLD AUTO: 218 10(3)UL (ref 150–450)
POTASSIUM SERPL-SCNC: 3.5 MMOL/L (ref 3.5–5.1)
PROT UR-MCNC: NEGATIVE MG/DL
RBC # BLD AUTO: 5.17 X10(6)UL (ref 4.3–5.7)
SODIUM SERPL-SCNC: 140 MMOL/L (ref 136–145)
SP GR UR STRIP: 1.01 (ref 1–1.03)
TROPONIN I SERPL-MCNC: <0.045 NG/ML (ref ?–0.04)
TROPONIN I SERPL-MCNC: <0.045 NG/ML (ref ?–0.04)
UROBILINOGEN UR STRIP-ACNC: <2
WBC # BLD AUTO: 7.4 X10(3) UL (ref 4–11)

## 2020-02-05 PROCEDURE — 78452 HT MUSCLE IMAGE SPECT MULT: CPT | Performed by: INTERNAL MEDICINE

## 2020-02-05 PROCEDURE — 71260 CT THORAX DX C+: CPT | Performed by: EMERGENCY MEDICINE

## 2020-02-05 PROCEDURE — 71045 X-RAY EXAM CHEST 1 VIEW: CPT

## 2020-02-05 PROCEDURE — 93017 CV STRESS TEST TRACING ONLY: CPT | Performed by: INTERNAL MEDICINE

## 2020-02-05 PROCEDURE — 99220 INITIAL OBSERVATION CARE,LEVL III: CPT | Performed by: HOSPITALIST

## 2020-02-05 RX ORDER — NITROGLYCERIN 0.4 MG/1
0.4 TABLET SUBLINGUAL EVERY 5 MIN PRN
Status: DISCONTINUED | OUTPATIENT
Start: 2020-02-05 | End: 2020-02-06

## 2020-02-05 RX ORDER — AMLODIPINE BESYLATE 5 MG/1
5 TABLET ORAL NIGHTLY
Status: DISCONTINUED | OUTPATIENT
Start: 2020-02-05 | End: 2020-02-06

## 2020-02-05 RX ORDER — ONDANSETRON 2 MG/ML
4 INJECTION INTRAMUSCULAR; INTRAVENOUS EVERY 6 HOURS PRN
Status: DISCONTINUED | OUTPATIENT
Start: 2020-02-05 | End: 2020-02-06

## 2020-02-05 RX ORDER — COVID-19 ANTIGEN TEST
440 KIT MISCELLANEOUS
Status: ON HOLD | COMMUNITY
End: 2020-02-06

## 2020-02-05 RX ORDER — SIMETHICONE 80 MG
125 TABLET,CHEWABLE ORAL EVERY 6 HOURS PRN
Status: DISCONTINUED | OUTPATIENT
Start: 2020-02-05 | End: 2020-02-06

## 2020-02-05 RX ORDER — BISACODYL 10 MG
10 SUPPOSITORY, RECTAL RECTAL
Status: DISCONTINUED | OUTPATIENT
Start: 2020-02-05 | End: 2020-02-06

## 2020-02-05 RX ORDER — ATORVASTATIN CALCIUM 20 MG/1
TABLET, FILM COATED ORAL
Qty: 90 TABLET | Refills: 0 | Status: SHIPPED | OUTPATIENT
Start: 2020-02-05 | End: 2020-06-18

## 2020-02-05 RX ORDER — LISINOPRIL AND HYDROCHLOROTHIAZIDE 25; 20 MG/1; MG/1
1 TABLET ORAL NIGHTLY
Status: DISCONTINUED | OUTPATIENT
Start: 2020-02-05 | End: 2020-02-05

## 2020-02-05 RX ORDER — ATORVASTATIN CALCIUM 20 MG/1
20 TABLET, FILM COATED ORAL NIGHTLY
Status: DISCONTINUED | OUTPATIENT
Start: 2020-02-05 | End: 2020-02-05

## 2020-02-05 RX ORDER — POLYETHYLENE GLYCOL 3350 17 G/17G
17 POWDER, FOR SOLUTION ORAL DAILY PRN
Status: DISCONTINUED | OUTPATIENT
Start: 2020-02-05 | End: 2020-02-06

## 2020-02-05 RX ORDER — MONTELUKAST SODIUM 10 MG/1
10 TABLET ORAL NIGHTLY
Status: DISCONTINUED | OUTPATIENT
Start: 2020-02-05 | End: 2020-02-06

## 2020-02-05 RX ORDER — AMLODIPINE BESYLATE 5 MG/1
5 TABLET ORAL DAILY
Status: DISCONTINUED | OUTPATIENT
Start: 2020-02-05 | End: 2020-02-05

## 2020-02-05 RX ORDER — SODIUM CHLORIDE 0.9 % (FLUSH) 0.9 %
3 SYRINGE (ML) INJECTION AS NEEDED
Status: DISCONTINUED | OUTPATIENT
Start: 2020-02-05 | End: 2020-02-06

## 2020-02-05 RX ORDER — POTASSIUM CHLORIDE 20 MEQ/1
40 TABLET, EXTENDED RELEASE ORAL EVERY 4 HOURS
Status: COMPLETED | OUTPATIENT
Start: 2020-02-05 | End: 2020-02-05

## 2020-02-05 RX ORDER — FLUTICASONE PROPIONATE 50 MCG
2 SPRAY, SUSPENSION (ML) NASAL DAILY
Status: DISCONTINUED | OUTPATIENT
Start: 2020-02-05 | End: 2020-02-05

## 2020-02-05 RX ORDER — HYDROCODONE BITARTRATE AND ACETAMINOPHEN 5; 325 MG/1; MG/1
1 TABLET ORAL EVERY 4 HOURS PRN
Status: DISCONTINUED | OUTPATIENT
Start: 2020-02-05 | End: 2020-02-06

## 2020-02-05 RX ORDER — SODIUM PHOSPHATE, DIBASIC AND SODIUM PHOSPHATE, MONOBASIC 7; 19 G/133ML; G/133ML
1 ENEMA RECTAL ONCE AS NEEDED
Status: DISCONTINUED | OUTPATIENT
Start: 2020-02-05 | End: 2020-02-06

## 2020-02-05 RX ORDER — DEXTROSE MONOHYDRATE 25 G/50ML
50 INJECTION, SOLUTION INTRAVENOUS
Status: DISCONTINUED | OUTPATIENT
Start: 2020-02-05 | End: 2020-02-06

## 2020-02-05 RX ORDER — HYDROCODONE BITARTRATE AND ACETAMINOPHEN 5; 325 MG/1; MG/1
2 TABLET ORAL EVERY 4 HOURS PRN
Status: DISCONTINUED | OUTPATIENT
Start: 2020-02-05 | End: 2020-02-06

## 2020-02-05 RX ORDER — ECHINACEA PURPUREA EXTRACT 125 MG
1 TABLET ORAL
Status: DISCONTINUED | OUTPATIENT
Start: 2020-02-05 | End: 2020-02-06

## 2020-02-05 RX ORDER — METOCLOPRAMIDE HYDROCHLORIDE 5 MG/ML
10 INJECTION INTRAMUSCULAR; INTRAVENOUS EVERY 8 HOURS PRN
Status: DISCONTINUED | OUTPATIENT
Start: 2020-02-05 | End: 2020-02-06

## 2020-02-05 RX ORDER — LISINOPRIL AND HYDROCHLOROTHIAZIDE 25; 20 MG/1; MG/1
TABLET ORAL
Qty: 90 TABLET | Refills: 0 | Status: SHIPPED | OUTPATIENT
Start: 2020-02-05 | End: 2020-06-18

## 2020-02-05 RX ORDER — ZOLPIDEM TARTRATE 5 MG/1
5 TABLET ORAL NIGHTLY PRN
Status: DISCONTINUED | OUTPATIENT
Start: 2020-02-05 | End: 2020-02-06

## 2020-02-05 RX ORDER — DOCUSATE SODIUM 100 MG/1
100 CAPSULE, LIQUID FILLED ORAL 2 TIMES DAILY
Status: DISCONTINUED | OUTPATIENT
Start: 2020-02-05 | End: 2020-02-06

## 2020-02-05 RX ORDER — ACETAMINOPHEN 325 MG/1
650 TABLET ORAL EVERY 4 HOURS PRN
Status: DISCONTINUED | OUTPATIENT
Start: 2020-02-05 | End: 2020-02-06

## 2020-02-05 RX ORDER — LISINOPRIL AND HYDROCHLOROTHIAZIDE 25; 20 MG/1; MG/1
1 TABLET ORAL DAILY
Status: DISCONTINUED | OUTPATIENT
Start: 2020-02-05 | End: 2020-02-05

## 2020-02-05 RX ORDER — HEPARIN SODIUM 5000 [USP'U]/ML
5000 INJECTION, SOLUTION INTRAVENOUS; SUBCUTANEOUS EVERY 8 HOURS SCHEDULED
Status: DISCONTINUED | OUTPATIENT
Start: 2020-02-05 | End: 2020-02-06

## 2020-02-05 RX ORDER — ACETAMINOPHEN 325 MG/1
650 TABLET ORAL EVERY 6 HOURS PRN
Status: DISCONTINUED | OUTPATIENT
Start: 2020-02-05 | End: 2020-02-06

## 2020-02-05 RX ORDER — AZELASTINE 1 MG/ML
2 SPRAY, METERED NASAL 2 TIMES DAILY
Status: DISCONTINUED | OUTPATIENT
Start: 2020-02-05 | End: 2020-02-05

## 2020-02-05 RX ORDER — ATORVASTATIN CALCIUM 20 MG/1
20 TABLET, FILM COATED ORAL NIGHTLY
Status: DISCONTINUED | OUTPATIENT
Start: 2020-02-05 | End: 2020-02-06

## 2020-02-05 NOTE — PLAN OF CARE
Pt had stress test today, no c/o chest pain or palpitations.   Problem: Patient Centered Care  Goal: Patient preferences are identified and integrated in the patient's plan of care  Description  Interventions:  - What would you like us to know as we care fo stability  - Monitor arterial and/or venous puncture sites for bleeding and/or hematoma  - Assess quality of pulses, skin color and temperature  - Assess for signs of decreased coronary artery perfusion - ex.  Angina  - Evaluate fluid balance, assess for ed

## 2020-02-05 NOTE — H&P
[facility]    PATIENT'S NAME: [facility]   ATTENDING PHYSICIAN: Edith Saleh MD   PATIENT ACCOUNT#:   [id number]    LOCATION:  [facility] 255 #:   [id number]       YOB: 1965  ADMISSION DATE:       02/05/  male in mild respiratory distress. VITAL SIGNS:  Temperature 98.2, pulse 83, respiratory rate 20, blood pressure 123/83, saturation 95%. BMI is 58. 17. He is 5 feet 11 inches and 417 pounds. HEENT:  Normocephalic, atraumatic.   Anicteric 16:00:57  t: 02/05/2020 16:37:45  Job [id number]  [facility]/

## 2020-02-05 NOTE — ED INITIAL ASSESSMENT (HPI)
Patient reports SOB x1 year, now worsened this am. He also reports that his heart is \"pounding out of his chest\"

## 2020-02-05 NOTE — RESPIRATORY THERAPY NOTE
CAMACHO ASSESSMENT:    Pt does have a previous diagnosis of CAMACHO. Pt does routinely use a CPAP device at home.      CPAP INITIATION:    Pt to be placed on CPAP: no  Pt refused: yes

## 2020-02-05 NOTE — TELEPHONE ENCOUNTER
Review pended refill request as it does not fall under a protocol.   Requested Prescriptions     Pending Prescriptions Disp Refills   • LISINOPRIL-HYDROCHLOROTHIAZIDE 20-25 MG Oral Tab [Pharmacy Med Name: LISINOPRIL-HCTZ 20/25MG TABLETS] 90 tablet 0     Sig

## 2020-02-05 NOTE — CONSULTS
[facility] Cardiology Consult Note    Sacha Burk Patient Status:  Emergency    1965 MRN [id number]   Location [address] Attending Megan Starkey MD   Hosp Day # 0 PCP Yehuda Soulier, MD     53year old male, co • Arthritis    • Candida infection 2/2017    Esophagitis; on diflucan   • COPD (chronic obstructive pulmonary disease) (HCC)    • Degenerative joint disease    • Depression    • Diabetes (HCC)    • Disorder of thyroid    • Essential hypertension    • Gas 20  BP: 118/73    Intake/Output:   No intake or output data in the 24 hours ending 02/05/20 0843    Physical Exam:     General: NAD  HEENT: Normocephalic, anicteric sclera, neck supple. Neck: No JVD, carotids 2+, no bruits.   Cardiac: Regular rate and rhyt

## 2020-02-05 NOTE — ED PROVIDER NOTES
Patient Seen in: [facility] AND [facility] Emergency Department      History   Patient presents with:  Dyspnea PRIYA SOB    Stated Complaint: heart pounding, sob    HPI  53 yoM with obesity, insulin-dependent type 2 diabetes, hypertension, hyperlipidemia presentin ENDOSCOPIC ULTRASOUND (EUS) N/A 2/22/2017    Performed by Kin Harper MD at [address] [place] [address] ENDOSCOPY   • ESOPHAGOGASTRODUODENOSCOPY (EGD) N/A 2/22/2017    Performed by Kin Harper MD at [address] [place] [address] ENDOSCOPY   • OTHER      LYMPH NODE BIOPSY NOSE   • ROBOT-ASSISTED METABOLIC PANEL (8) - Abnormal; Notable for the following components:       Result Value    Glucose 214 (*)     BUN/CREA Ratio 9.2 (*)     Calculated Osmolality 296 (*)     All other components within normal limits   D-DIMER - Abnormal; Notable for the fol Ray Quach MD on 2/05/2020 at 7:39     Approved by (CST): Ray Quach MD on 2/05/2020 at 7:47                  MDM   50-year-old male with multiple cardiac risk factors presenting with dyspnea on exertion and palpitations on exertion.   Given p

## 2020-02-05 NOTE — CM/SW NOTE
MAXIMO met with the pt. At bedside. The pt. Lives with his wife in a one level home with 3 steps to enter. The pt. Reports being independent prior to admission with adls, ambulation with a cane and driving. The pt. Has 1 son who is 32 who lives in [facility].

## 2020-02-06 ENCOUNTER — APPOINTMENT (OUTPATIENT)
Dept: CV DIAGNOSTICS | Facility: HOSPITAL | Age: 55
End: 2020-02-06
Attending: HOSPITALIST
Payer: MEDICARE

## 2020-02-06 ENCOUNTER — TELEPHONE (OUTPATIENT)
Dept: ENDOCRINOLOGY CLINIC | Facility: CLINIC | Age: 55
End: 2020-02-06

## 2020-02-06 VITALS
RESPIRATION RATE: 20 BRPM | WEIGHT: 315 LBS | DIASTOLIC BLOOD PRESSURE: 80 MMHG | SYSTOLIC BLOOD PRESSURE: 135 MMHG | HEIGHT: 71 IN | TEMPERATURE: 99 F | OXYGEN SATURATION: 92 % | BODY MASS INDEX: 44.1 KG/M2 | HEART RATE: 96 BPM

## 2020-02-06 LAB
ALBUMIN SERPL-MCNC: 3.7 G/DL (ref 3.4–5)
ALBUMIN/GLOB SERPL: 0.9 {RATIO} (ref 1–2)
ALP LIVER SERPL-CCNC: 107 U/L (ref 45–117)
ALT SERPL-CCNC: 49 U/L (ref 16–61)
ANION GAP SERPL CALC-SCNC: 7 MMOL/L (ref 0–18)
AST SERPL-CCNC: 21 U/L (ref 15–37)
BASOPHILS # BLD AUTO: 0.07 X10(3) UL (ref 0–0.2)
BASOPHILS NFR BLD AUTO: 1 %
BILIRUB SERPL-MCNC: 0.5 MG/DL (ref 0.1–2)
BUN BLD-MCNC: 11 MG/DL (ref 7–18)
BUN/CREAT SERPL: 10.3 (ref 10–20)
CALCIUM BLD-MCNC: 9.6 MG/DL (ref 8.5–10.1)
CHLORIDE SERPL-SCNC: 104 MMOL/L (ref 98–112)
CHOLEST SMN-MCNC: 158 MG/DL (ref ?–200)
CO2 SERPL-SCNC: 27 MMOL/L (ref 21–32)
CREAT BLD-MCNC: 1.07 MG/DL (ref 0.7–1.3)
DEPRECATED RDW RBC AUTO: 43.5 FL (ref 35.1–46.3)
EOSINOPHIL # BLD AUTO: 0.11 X10(3) UL (ref 0–0.7)
EOSINOPHIL NFR BLD AUTO: 1.6 %
ERYTHROCYTE [DISTWIDTH] IN BLOOD BY AUTOMATED COUNT: 14.9 % (ref 11–15)
GLOBULIN PLAS-MCNC: 4 G/DL (ref 2.8–4.4)
GLUCOSE BLD-MCNC: 165 MG/DL (ref 70–99)
GLUCOSE BLDC GLUCOMTR-MCNC: 149 MG/DL (ref 70–99)
GLUCOSE BLDC GLUCOMTR-MCNC: 196 MG/DL (ref 70–99)
GLUCOSE BLDC GLUCOMTR-MCNC: 280 MG/DL (ref 70–99)
HAV IGM SER QL: 1.9 MG/DL (ref 1.6–2.6)
HCT VFR BLD AUTO: 44.6 % (ref 39–53)
HDLC SERPL-MCNC: 45 MG/DL (ref 40–59)
HGB BLD-MCNC: 14.3 G/DL (ref 13–17.5)
IMM GRANULOCYTES # BLD AUTO: 0.01 X10(3) UL (ref 0–1)
IMM GRANULOCYTES NFR BLD: 0.1 %
INR BLD: 0.98 (ref 0.9–1.2)
LDLC SERPL CALC-MCNC: 96 MG/DL (ref ?–100)
LYMPHOCYTES # BLD AUTO: 1.84 X10(3) UL (ref 1–4)
LYMPHOCYTES NFR BLD AUTO: 26.5 %
M PROTEIN MFR SERPL ELPH: 7.7 G/DL (ref 6.4–8.2)
MCH RBC QN AUTO: 25.8 PG (ref 26–34)
MCHC RBC AUTO-ENTMCNC: 32.1 G/DL (ref 31–37)
MCV RBC AUTO: 80.4 FL (ref 80–100)
MONOCYTES # BLD AUTO: 0.52 X10(3) UL (ref 0.1–1)
MONOCYTES NFR BLD AUTO: 7.5 %
NEUTROPHILS # BLD AUTO: 4.39 X10 (3) UL (ref 1.5–7.7)
NEUTROPHILS # BLD AUTO: 4.39 X10(3) UL (ref 1.5–7.7)
NEUTROPHILS NFR BLD AUTO: 63.3 %
NONHDLC SERPL-MCNC: 113 MG/DL (ref ?–130)
OSMOLALITY SERPL CALC.SUM OF ELEC: 289 MOSM/KG (ref 275–295)
PLATELET # BLD AUTO: 246 10(3)UL (ref 150–450)
POTASSIUM SERPL-SCNC: 4 MMOL/L (ref 3.5–5.1)
PROTHROMBIN TIME: 12.8 SECONDS (ref 11.8–14.5)
RBC # BLD AUTO: 5.55 X10(6)UL (ref 4.3–5.7)
SODIUM SERPL-SCNC: 138 MMOL/L (ref 136–145)
TRIGL SERPL-MCNC: 85 MG/DL (ref 30–149)
TSI SER-ACNC: 2.04 MIU/ML (ref 0.36–3.74)
VLDLC SERPL CALC-MCNC: 17 MG/DL (ref 0–30)
WBC # BLD AUTO: 6.9 X10(3) UL (ref 4–11)

## 2020-02-06 PROCEDURE — 93306 TTE W/DOPPLER COMPLETE: CPT | Performed by: HOSPITALIST

## 2020-02-06 PROCEDURE — 99214 OFFICE O/P EST MOD 30 MIN: CPT | Performed by: INTERNAL MEDICINE

## 2020-02-06 PROCEDURE — 99217 OBSERVATION CARE DISCHARGE: CPT | Performed by: HOSPITALIST

## 2020-02-06 RX ORDER — PANTOPRAZOLE SODIUM 40 MG/1
40 TABLET, DELAYED RELEASE ORAL
Qty: 30 TABLET | Refills: 0 | Status: SHIPPED | OUTPATIENT
Start: 2020-02-07 | End: 2021-12-13

## 2020-02-06 RX ORDER — PSEUDOEPHEDRINE HCL 30 MG
100 TABLET ORAL 2 TIMES DAILY PRN
Qty: 20 CAPSULE | Refills: 0 | Status: SHIPPED | OUTPATIENT
Start: 2020-02-06

## 2020-02-06 RX ORDER — ACETAMINOPHEN 325 MG/1
650 TABLET ORAL EVERY 4 HOURS PRN
Qty: 1 TABLET | Refills: 0 | Status: SHIPPED | COMMUNITY
Start: 2020-02-06 | End: 2020-02-10

## 2020-02-06 RX ORDER — PANTOPRAZOLE SODIUM 40 MG/1
40 TABLET, DELAYED RELEASE ORAL
Status: DISCONTINUED | OUTPATIENT
Start: 2020-02-07 | End: 2020-02-06

## 2020-02-06 NOTE — PLAN OF CARE
Problem: Patient Centered Care  Goal: Patient preferences are identified and integrated in the patient's plan of care  Description  Interventions:  - What would you like us to know as we care for you?  \"I'm a big baby\"  - Provide timely, complete, and a for bleeding and/or hematoma  - Assess quality of pulses, skin color and temperature  - Assess for signs of decreased coronary artery perfusion - ex.  Angina  - Evaluate fluid balance, assess for edema, trend weights  Outcome: Progressing  Goal: Absence of

## 2020-02-06 NOTE — PROGRESS NOTES
[facility]    Cardiology Progress Note    Angieith Bamberger Patient Status:  Observation    1965 MRN [id number]   Location [facility] 1W Attending Singh Saleh Day # 0 PCP Genesis Alicea MD      53 y rhythm, no S3, No Rub, No Murmur  Abd: Abdomen soft, nontender, nondistended  Extremities: No peripheral Edema  Psych: Cooperative    Results:     Lab Results   Component Value Date    WBC 6.9 02/06/2020    HGB 14.3 02/06/2020    HCT 44.6 02/06/2020    PLT -anterior fascicular block. - Nonspecific T-abnormality.  ABNORMAL When compared with ECG of 11/29/2017 19:09:18 No significant changes have occurred Electronically signed on 02/05/2020 at 21:17 by TABBY Hannon  S Cardiology

## 2020-02-06 NOTE — TELEPHONE ENCOUNTER
Pt booked apt in 1 week on 2/13/20 at 10:30 am w/ CDE. Ever W Finn Mcqueen reminder sent to pt as requested.     Routed to [facility] as [facility]

## 2020-02-06 NOTE — DISCHARGE SUMMARY
Dc summary#[id number]  > 30 min spent on [address] Discharge Diagnoses: non cardiac chest pain    Lace+ Score: 60  59-90 High Risk  29-58 Medium Risk  0-28   Low Risk. TCM Follow-Up Recommendation:  LACE 29-58:  Moderate Risk of readmission after disc

## 2020-02-06 NOTE — PLAN OF CARE
2d echo done  Problem: Patient Centered Care  Goal: Patient preferences are identified and integrated in the patient's plan of care  Description  Interventions:  - What would you like us to know as we care for you?  \"I'm a big baby\"  - Provide timely, com stability  - Monitor arterial and/or venous puncture sites for bleeding and/or hematoma  - Assess quality of pulses, skin color and temperature  - Assess for signs of decreased coronary artery perfusion - ex.  Angina  - Evaluate fluid balance, assess for ed

## 2020-02-06 NOTE — CONSULTS
[facility]    Report of Consultation    Boo Show Patient Status:  Observation    1965 MRN [id number]   Location [facility] 1W Attending Singh Saleh Day # 0 PCP Nova Sandoval MD     Date Performed by Zohra Mariee MD at [address] [place] [address] (EUS) N/A 2/22/2017    Performed by Cornell Briggs MD at [address] [place] [address] ENDOSCOPY   • ESOPHAGOGASTRODUODENOSCOPY (EGD) N/A 2/22/2017    Performed by Cornell Briggs MD at [address] [place] [address] ENDOSCOPY   • Nightly PRN  •  docusate sodium (COLACE) cap 100 mg, 100 mg, Oral, BID  •  PEG 3350 (MIRALAX) powder packet 17 g, 17 g, Oral, Daily PRN  •  magnesium hydroxide (MILK OF MAGNESIA) 400 MG/5ML suspension 30 mL, 30 mL, Oral, Daily PRN  •  bisacodyl (DULCOLAX) rate and rhythm. Abdomen: Bowel sounds present, normoactive. Nontender. Extremities:  No lower extremity edema noted. Skin: Normal texture and turgor. Lymphatic:  No cervical lymphadenopathy.     Impression and Plan:  Patient Active Problem List:

## 2020-02-07 ENCOUNTER — PATIENT OUTREACH (OUTPATIENT)
Dept: CASE MANAGEMENT | Age: 55
End: 2020-02-07

## 2020-02-07 DIAGNOSIS — Z02.9 ENCOUNTERS FOR ADMINISTRATIVE PURPOSE: ICD-10-CM

## 2020-02-07 DIAGNOSIS — R06.00 DYSPNEA ON EXERTION: ICD-10-CM

## 2020-02-07 PROCEDURE — 1111F DSCHRG MED/CURRENT MED MERGE: CPT

## 2020-02-07 NOTE — PROGRESS NOTES
Initial Post Discharge Follow Up   Discharge Date: 2/6/20  Contact Date: 2/7/2020    Consent Verification:  Assessment Completed With: Patient  HIPAA Verified?   Yes    Discharge Dx:   Dyspnea on exertion        General:   • How have you been since your differently: Take 5 mg by mouth nightly. TAKE 1 TABLET(5 MG) BY MOUTH DAILY ) 90 tablet 0   • Azelastine HCl 0.1 % Nasal Solution 2 sprays by Nasal route 2 (two) times daily.  1 Bottle 0   • Insulin NPH & Regular (HUMULIN 70/30 KWIKPEN) (70-30) 100 UNIT/ML prior to leaving the hospital? yes  • (NCM) If a new medication was prescribed:    o Was the new medication’s purpose & side effects reviewed? yes  o Do you have any questions about your new medication?  No  • Did you  your discharge medications when seek medical attention with the patient. He states that he never had chest pain. He states that his only symptoms were his heart pounding causing him to have shortness of breath. He states those have since resolved and not returned.  He states that he feels

## 2020-02-07 NOTE — DISCHARGE SUMMARY
[facility]    PATIENT'S NAME: Jaspreet Viramontes   ATTENDING PHYSICIAN: Edith Saleh MD   PATIENT ACCOUNT#:   [id number]    LOCATION:  [facility] 255 #:   [id number]       YOB: 1965  ADMISSION DATE:       02/05/ and palpitations on exertion, perhaps some chest tightness. No obvious cardiac cause found. 2.   Early satiety, possible recurrence of a gastrointestinal tumor. He had a gastrointestinal stromal tumor resected in the past or gastroparesis.   He needs to

## 2020-02-08 ENCOUNTER — TELEPHONE (OUTPATIENT)
Dept: CARDIOLOGY UNIT | Facility: HOSPITAL | Age: 55
End: 2020-02-08

## 2020-02-10 ENCOUNTER — OFFICE VISIT (OUTPATIENT)
Dept: INTERNAL MEDICINE CLINIC | Facility: CLINIC | Age: 55
End: 2020-02-10
Payer: COMMERCIAL

## 2020-02-10 VITALS
HEIGHT: 71 IN | WEIGHT: 315 LBS | SYSTOLIC BLOOD PRESSURE: 104 MMHG | DIASTOLIC BLOOD PRESSURE: 70 MMHG | HEART RATE: 110 BPM | RESPIRATION RATE: 16 BRPM | BODY MASS INDEX: 44.1 KG/M2

## 2020-02-10 DIAGNOSIS — D69.6 THROMBOCYTOPENIA (HCC): ICD-10-CM

## 2020-02-10 DIAGNOSIS — E11.9 TYPE 2 DIABETES MELLITUS WITHOUT COMPLICATION, WITH LONG-TERM CURRENT USE OF INSULIN (HCC): Primary | ICD-10-CM

## 2020-02-10 DIAGNOSIS — J44.9 ASTHMA WITH COPD (CHRONIC OBSTRUCTIVE PULMONARY DISEASE) (HCC): ICD-10-CM

## 2020-02-10 DIAGNOSIS — Z79.4 TYPE 2 DIABETES MELLITUS WITHOUT COMPLICATION, WITH LONG-TERM CURRENT USE OF INSULIN (HCC): Primary | ICD-10-CM

## 2020-02-10 DIAGNOSIS — C16.9 MALIGNANT NEOPLASM OF STOMACH, UNSPECIFIED LOCATION (HCC): ICD-10-CM

## 2020-02-10 DIAGNOSIS — F32.1 CURRENT MODERATE EPISODE OF MAJOR DEPRESSIVE DISORDER WITHOUT PRIOR EPISODE (HCC): ICD-10-CM

## 2020-02-10 PROCEDURE — 99496 TRANSJ CARE MGMT HIGH F2F 7D: CPT | Performed by: INTERNAL MEDICINE

## 2020-02-10 PROCEDURE — 1111F DSCHRG MED/CURRENT MED MERGE: CPT | Performed by: INTERNAL MEDICINE

## 2020-02-10 NOTE — PROGRESS NOTES
HPI:    Jose Alejandro Cuellar is a 53year old male here today for hospital follow up.    He was discharged from Inpatient hospital, [facility]  to Home   Admission Date: 2/5/20   Discharge Date: 2/6/20  Hospital Discharge Diagnoses (since 1/11/2020) gastroparesis, also he was quite stressed and his sugar was elevated, and Dr. Nish Osorio came to adjust his diabetes medications. I strongly encouraged him to follow up with GI as an outpatient. Allergies:  He has No Known Allergies.     Current Meds:  docus [facility] NASAL SPRAY FULL FORCE) 0.05 % Nasal Solution, 1 spray by Nasal route nightly as needed for congestion. Azelastine HCl 0.1 % Nasal Solution, 2 sprays by Nasal route 2 (two) times daily.  (Patient not taking: Reported on 2/10/2020 )  Saline Nasal S denies any unusual skin lesions  EYES: denies blurred vision or double vision  HEENT: denies nasal congestion, sinus pain or ST  LUNGS: denies shortness of breath with exertion  CARDIOVASCULAR: denies chest pain on exertion or palpitations  GI: denies abdo office visit. Patient checks feet regularly for sensation and has noted no sores. Pt denies any tingling of the feet. Patient has seen the opthalmologist in the last year. Pt has no issues with depression.      2. Malignant neoplasm of stomach, unspecified increase the distance or time walked. A healthy BMI is considered 25 or less. - BARIATRICS - INTERNAL    No orders of the defined types were placed in this encounter.       Meds & Refills for this Visit:  Requested Prescriptions      No prescriptions re

## 2020-02-18 RX ORDER — AZELASTINE 1 MG/ML
SPRAY, METERED NASAL
Qty: 30 ML | Refills: 0 | Status: SHIPPED | OUTPATIENT
Start: 2020-02-18 | End: 2020-06-23

## 2020-02-18 NOTE — TELEPHONE ENCOUNTER
LOV 7/18/19. RTC 3 months. Called to schedule. Pt schedule apt 4/10/20. Pt aware of office location.

## 2020-02-19 RX ORDER — BLOOD SUGAR DIAGNOSTIC
STRIP MISCELLANEOUS
Qty: 400 STRIP | Refills: 0 | Status: SHIPPED | OUTPATIENT
Start: 2020-02-19 | End: 2021-12-13

## 2020-03-06 ENCOUNTER — TELEPHONE (OUTPATIENT)
Dept: CASE MANAGEMENT | Age: 55
End: 2020-03-06

## 2020-03-06 NOTE — TELEPHONE ENCOUNTER
Patient is eligible for a 2020 Medicare Advantage Supervisit. Discussed in detail w/patient. Appt scheduled for 4/9/2020.

## 2020-03-10 RX ORDER — METFORMIN HYDROCHLORIDE 500 MG/1
TABLET, EXTENDED RELEASE ORAL
Qty: 180 TABLET | Refills: 0 | Status: SHIPPED | OUTPATIENT
Start: 2020-03-10 | End: 2020-07-15

## 2020-04-04 RX ORDER — AMLODIPINE BESYLATE 5 MG/1
TABLET ORAL
Qty: 90 TABLET | Refills: 0 | Status: SHIPPED | OUTPATIENT
Start: 2020-04-04 | End: 2020-07-14

## 2020-06-18 RX ORDER — LISINOPRIL AND HYDROCHLOROTHIAZIDE 25; 20 MG/1; MG/1
TABLET ORAL
Qty: 90 TABLET | Refills: 0 | Status: SHIPPED | OUTPATIENT
Start: 2020-06-18 | End: 2021-03-01

## 2020-06-18 RX ORDER — ATORVASTATIN CALCIUM 20 MG/1
TABLET, FILM COATED ORAL
Qty: 90 TABLET | Refills: 0 | Status: SHIPPED | OUTPATIENT
Start: 2020-06-18 | End: 2021-01-15

## 2020-06-23 RX ORDER — AZELASTINE 1 MG/ML
SPRAY, METERED NASAL
Qty: 30 ML | Refills: 0 | Status: SHIPPED | OUTPATIENT
Start: 2020-06-23 | End: 2021-12-13

## 2020-06-29 ENCOUNTER — NURSE TRIAGE (OUTPATIENT)
Dept: INTERNAL MEDICINE CLINIC | Facility: CLINIC | Age: 55
End: 2020-06-29

## 2020-06-29 NOTE — TELEPHONE ENCOUNTER
Action Requested: Summary for Provider     []  Critical Lab, Recommendations Needed  [] Need Additional Advice  []   FYI    []   Need Orders  [] Need Medications Sent to Pharmacy  []  Other     SUMMARY:   The patient will go to the ED.     He stated he was

## 2020-06-30 ENCOUNTER — TELEPHONE (OUTPATIENT)
Dept: CASE MANAGEMENT | Age: 55
End: 2020-06-30

## 2020-06-30 NOTE — TELEPHONE ENCOUNTER
Left message to call back     No record of ER visit to [facility] ER or in Care  everywhere

## 2020-06-30 NOTE — TELEPHONE ENCOUNTER
Patient stating soon after the conversation yesterday, he did have a bowel movement and felt substantially better. He was very tired and was able to sleep. Patient now feeling better. He will follow with GI as originally planned.  Advised if symptoms worsen

## 2020-07-02 ENCOUNTER — OFFICE VISIT (OUTPATIENT)
Dept: ENDOCRINOLOGY CLINIC | Facility: CLINIC | Age: 55
End: 2020-07-02
Payer: COMMERCIAL

## 2020-07-02 VITALS
HEART RATE: 89 BPM | SYSTOLIC BLOOD PRESSURE: 134 MMHG | BODY MASS INDEX: 56 KG/M2 | WEIGHT: 315 LBS | DIASTOLIC BLOOD PRESSURE: 87 MMHG

## 2020-07-02 DIAGNOSIS — E11.65 UNCONTROLLED TYPE 2 DIABETES MELLITUS WITH HYPERGLYCEMIA (HCC): Primary | ICD-10-CM

## 2020-07-02 DIAGNOSIS — E78.2 MIXED HYPERLIPIDEMIA: ICD-10-CM

## 2020-07-02 DIAGNOSIS — I10 ESSENTIAL HYPERTENSION WITH GOAL BLOOD PRESSURE LESS THAN 130/85: ICD-10-CM

## 2020-07-02 PROCEDURE — 99214 OFFICE O/P EST MOD 30 MIN: CPT | Performed by: NURSE PRACTITIONER

## 2020-07-02 NOTE — PATIENT INSTRUCTIONS
Your A1C: 11.5% today   This is too high for you and we will work together on lowering your blood sugars to help improve your health  The main goal of diabetes treatment is to keep your sugar from going too high.  We measure your overall blood sugar trends glucose anyway. 2. Take 15 grams of carbohydrate (carb). Here are some choices:  4 oz. regular fruit juice  3-4 glucose tablets  6 oz. regular soda   7-8 jelly beans  3. Recheck blood glucose after 10-15 minutes.  If blood glucose is still low (less than 7

## 2020-07-02 NOTE — PROGRESS NOTES
Name: Patti Ann  Date: 7/2/2020    Referring Physician: No ref. provider found    CHIEF COMPLAINT   No chief complaint on file.     HISTORY OF PRESENT ILLNESS   Patti Ann is a 63year old male who presents for follow up on diabetes managemen - 1 egg with bell peppers, 1 sausage delaney or 1 ko   has 1 Boost with glucose control -4x  Daily replacing lunch and dinner meals.      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 General Appearance:  alert, well developed, in no acute distress  Eyes:  pupils are equal, round and reactive. Normal conjunctivae and normal sclera   Neck: Trachea midline: Normal.   Respiratory:  clear to auscultation bilaterally.  Non-labored, BG readings are well controlled currently as readings reviewed per above. -reviewed miralax as a laxative option when constipated.      b) No nephropathy: GFR:78 on 2/2020  and urine MA: 473.3 on 2/2020   c) Discussed importance of annual eye exams  d) Fo

## 2020-07-14 ENCOUNTER — TELEPHONE (OUTPATIENT)
Dept: INTERNAL MEDICINE CLINIC | Facility: CLINIC | Age: 55
End: 2020-07-14

## 2020-07-14 RX ORDER — AMLODIPINE BESYLATE 5 MG/1
TABLET ORAL
Qty: 90 TABLET | Refills: 1 | Status: SHIPPED | OUTPATIENT
Start: 2020-07-14 | End: 2021-12-13

## 2020-07-14 NOTE — TELEPHONE ENCOUNTER
Current Outpatient Medications:   •  amLODIPine Besylate 5 MG Oral Tab, TAKE 1 TABLET BY MOUTH EVERY DAY, Disp: 90 tablet, Rfl: 0

## 2020-07-15 RX ORDER — METFORMIN HYDROCHLORIDE 500 MG/1
TABLET, EXTENDED RELEASE ORAL
Qty: 180 TABLET | Refills: 0 | Status: SHIPPED | OUTPATIENT
Start: 2020-07-15 | End: 2021-03-11

## 2020-07-21 ENCOUNTER — TELEPHONE (OUTPATIENT)
Dept: INTERNAL MEDICINE CLINIC | Facility: CLINIC | Age: 55
End: 2020-07-21

## 2020-07-21 DIAGNOSIS — E78.2 MIXED HYPERLIPIDEMIA: ICD-10-CM

## 2020-07-21 DIAGNOSIS — Z79.4 TYPE 2 DIABETES MELLITUS WITHOUT COMPLICATION, WITH LONG-TERM CURRENT USE OF INSULIN (HCC): Primary | ICD-10-CM

## 2020-07-21 DIAGNOSIS — I10 ESSENTIAL HYPERTENSION WITH GOAL BLOOD PRESSURE LESS THAN 130/85: ICD-10-CM

## 2020-07-21 DIAGNOSIS — E11.9 TYPE 2 DIABETES MELLITUS WITHOUT COMPLICATION, WITH LONG-TERM CURRENT USE OF INSULIN (HCC): Primary | ICD-10-CM

## 2020-07-21 NOTE — TELEPHONE ENCOUNTER
Patient requesting labs for physical     Comments:   I have an appointment on the 20th.  In past visits, Dr. Mansi Valdovinos has asked that I request blood tests so that they are available for his review on the day of the physical. Please ask if he would like me

## 2020-07-21 NOTE — TELEPHONE ENCOUNTER
Dr. Pallavi Gar, patient is schedule for a Medicare Px on 08/20/2020. Patient is requesting blood test order for appointment. Please advise.

## 2020-07-22 NOTE — TELEPHONE ENCOUNTER
From  [place] To  Prakash Jones Sent and Delivered  7/21/2020 11:42 AM   Last Read in [address]  7/21/2020  1:32 PM by Yvette Jolly

## 2020-07-23 ENCOUNTER — TELEPHONE (OUTPATIENT)
Dept: INTERNAL MEDICINE CLINIC | Facility: CLINIC | Age: 55
End: 2020-07-23

## 2020-07-23 DIAGNOSIS — K31.84 GASTROPARESIS: Primary | ICD-10-CM

## 2020-08-10 ENCOUNTER — APPOINTMENT (OUTPATIENT)
Dept: ENDOCRINOLOGY | Facility: HOSPITAL | Age: 55
End: 2020-08-10
Attending: NURSE PRACTITIONER
Payer: MEDICARE

## 2020-08-10 ENCOUNTER — HOSPITAL ENCOUNTER (OUTPATIENT)
Dept: ENDOCRINOLOGY | Facility: HOSPITAL | Age: 55
Discharge: HOME | End: 2020-08-10
Attending: NURSE PRACTITIONER
Payer: MEDICARE

## 2020-08-10 VITALS — WEIGHT: 315 LBS | BODY MASS INDEX: 56 KG/M2

## 2020-08-10 DIAGNOSIS — Z79.4 TYPE 2 DIABETES MELLITUS WITHOUT COMPLICATION, WITH LONG-TERM CURRENT USE OF INSULIN (HCC): Primary | ICD-10-CM

## 2020-08-10 DIAGNOSIS — E11.9 TYPE 2 DIABETES MELLITUS WITHOUT COMPLICATION, WITH LONG-TERM CURRENT USE OF INSULIN (HCC): Primary | ICD-10-CM

## 2020-08-10 PROCEDURE — 97802 MEDICAL NUTRITION INDIV IN: CPT

## 2020-08-10 NOTE — PROGRESS NOTES
Medical Nutrition Therapy Assessment    Abbie Chino 2/26/1965 was seen for individual Diabetic Medical Nutrition Therapy:    Date: 8/10/2020   Start time: 915A End time: 10:15A    Assessment  DM x 2-3 years ago   Diagnosed with Gastroparesis around F breath    Nutrition Rx: [id number] for weight loss (calories) around 80 g pro/day      Nutrition Diagnosis  Intake: inconsistent carbohydrate intake  Behavioral and environmental: nutrition and nutrition-related knowledge deficit  Related to gastroparesis a planning, healthy food shopping, cooking tips, and need to pre prepare foods    [] educated on emotional eating and being mindful at meals   []  reviewed other behavior modification strategies    []emphasized the need for small, sustainable changes and wor

## 2020-08-15 ENCOUNTER — APPOINTMENT (OUTPATIENT)
Dept: LAB | Facility: HOSPITAL | Age: 55
End: 2020-08-15
Attending: NURSE PRACTITIONER
Payer: MEDICARE

## 2020-08-15 LAB
ALBUMIN SERPL-MCNC: 3.4 G/DL (ref 3.4–5)
ALBUMIN/GLOB SERPL: 0.8 {RATIO} (ref 1–2)
ALP LIVER SERPL-CCNC: 135 U/L (ref 45–117)
ALT SERPL-CCNC: 105 U/L (ref 16–61)
ANION GAP SERPL CALC-SCNC: 4 MMOL/L (ref 0–18)
AST SERPL-CCNC: 59 U/L (ref 15–37)
BASOPHILS # BLD AUTO: 0.04 X10(3) UL (ref 0–0.2)
BASOPHILS NFR BLD AUTO: 0.5 %
BILIRUB SERPL-MCNC: 0.4 MG/DL (ref 0.1–2)
BUN BLD-MCNC: 10 MG/DL (ref 7–18)
BUN/CREAT SERPL: 10.8 (ref 10–20)
CALCIUM BLD-MCNC: 9.1 MG/DL (ref 8.5–10.1)
CHLORIDE SERPL-SCNC: 107 MMOL/L (ref 98–112)
CHOLEST SMN-MCNC: 136 MG/DL (ref ?–200)
CO2 SERPL-SCNC: 29 MMOL/L (ref 21–32)
CREAT BLD-MCNC: 0.93 MG/DL (ref 0.7–1.3)
DEPRECATED RDW RBC AUTO: 45.9 FL (ref 35.1–46.3)
EOSINOPHIL # BLD AUTO: 0.17 X10(3) UL (ref 0–0.7)
EOSINOPHIL NFR BLD AUTO: 2.3 %
ERYTHROCYTE [DISTWIDTH] IN BLOOD BY AUTOMATED COUNT: 15.4 % (ref 11–15)
EST. AVERAGE GLUCOSE BLD GHB EST-MCNC: 197 MG/DL (ref 68–126)
GLOBULIN PLAS-MCNC: 4.4 G/DL (ref 2.8–4.4)
GLUCOSE BLD-MCNC: 95 MG/DL (ref 70–99)
HBA1C MFR BLD HPLC: 8.5 % (ref ?–5.7)
HCT VFR BLD AUTO: 43.1 % (ref 39–53)
HDLC SERPL-MCNC: 38 MG/DL (ref 40–59)
HGB BLD-MCNC: 13.9 G/DL (ref 13–17.5)
IMM GRANULOCYTES # BLD AUTO: 0.02 X10(3) UL (ref 0–1)
IMM GRANULOCYTES NFR BLD: 0.3 %
LDLC SERPL CALC-MCNC: 87 MG/DL (ref ?–100)
LYMPHOCYTES # BLD AUTO: 1.73 X10(3) UL (ref 1–4)
LYMPHOCYTES NFR BLD AUTO: 23.1 %
M PROTEIN MFR SERPL ELPH: 7.8 G/DL (ref 6.4–8.2)
MCH RBC QN AUTO: 26.7 PG (ref 26–34)
MCHC RBC AUTO-ENTMCNC: 32.3 G/DL (ref 31–37)
MCV RBC AUTO: 82.9 FL (ref 80–100)
MONOCYTES # BLD AUTO: 0.7 X10(3) UL (ref 0.1–1)
MONOCYTES NFR BLD AUTO: 9.4 %
NEUTROPHILS # BLD AUTO: 4.82 X10 (3) UL (ref 1.5–7.7)
NEUTROPHILS # BLD AUTO: 4.82 X10(3) UL (ref 1.5–7.7)
NEUTROPHILS NFR BLD AUTO: 64.4 %
NONHDLC SERPL-MCNC: 98 MG/DL (ref ?–130)
OSMOLALITY SERPL CALC.SUM OF ELEC: 289 MOSM/KG (ref 275–295)
PATIENT FASTING Y/N/NP: YES
PATIENT FASTING Y/N/NP: YES
PLATELET # BLD AUTO: 246 10(3)UL (ref 150–450)
POTASSIUM SERPL-SCNC: 3.9 MMOL/L (ref 3.5–5.1)
RBC # BLD AUTO: 5.2 X10(6)UL (ref 4.3–5.7)
SODIUM SERPL-SCNC: 140 MMOL/L (ref 136–145)
TRIGL SERPL-MCNC: 57 MG/DL (ref 30–149)
TSI SER-ACNC: 1.02 MIU/ML (ref 0.36–3.74)
VLDLC SERPL CALC-MCNC: 11 MG/DL (ref 0–30)
WBC # BLD AUTO: 7.5 X10(3) UL (ref 4–11)

## 2020-08-15 PROCEDURE — 36415 COLL VENOUS BLD VENIPUNCTURE: CPT | Performed by: NURSE PRACTITIONER

## 2020-08-15 PROCEDURE — 80053 COMPREHEN METABOLIC PANEL: CPT | Performed by: PHYSICIAN ASSISTANT

## 2020-08-15 PROCEDURE — 80061 LIPID PANEL: CPT | Performed by: PHYSICIAN ASSISTANT

## 2020-08-15 PROCEDURE — 83036 HEMOGLOBIN GLYCOSYLATED A1C: CPT | Performed by: NURSE PRACTITIONER

## 2020-08-15 PROCEDURE — 84443 ASSAY THYROID STIM HORMONE: CPT | Performed by: PHYSICIAN ASSISTANT

## 2020-08-15 PROCEDURE — 85025 COMPLETE CBC W/AUTO DIFF WBC: CPT | Performed by: NURSE PRACTITIONER

## 2020-08-20 ENCOUNTER — OFFICE VISIT (OUTPATIENT)
Dept: INTERNAL MEDICINE CLINIC | Facility: CLINIC | Age: 55
End: 2020-08-20
Payer: COMMERCIAL

## 2020-08-20 VITALS
BODY MASS INDEX: 45.1 KG/M2 | HEIGHT: 70 IN | SYSTOLIC BLOOD PRESSURE: 161 MMHG | HEART RATE: 98 BPM | RESPIRATION RATE: 16 BRPM | DIASTOLIC BLOOD PRESSURE: 95 MMHG | WEIGHT: 315 LBS

## 2020-08-20 DIAGNOSIS — J44.9 ASTHMA WITH COPD (CHRONIC OBSTRUCTIVE PULMONARY DISEASE) (HCC): ICD-10-CM

## 2020-08-20 DIAGNOSIS — E66.01 MORBID OBESITY WITH BMI OF 50.0-59.9, ADULT (HCC): ICD-10-CM

## 2020-08-20 DIAGNOSIS — J34.89 NASAL OBSTRUCTION: ICD-10-CM

## 2020-08-20 DIAGNOSIS — M17.0 PRIMARY OSTEOARTHRITIS OF BOTH KNEES: ICD-10-CM

## 2020-08-20 DIAGNOSIS — D69.6 THROMBOCYTOPENIA (HCC): ICD-10-CM

## 2020-08-20 DIAGNOSIS — H25.13 AGE-RELATED NUCLEAR CATARACT OF BOTH EYES: ICD-10-CM

## 2020-08-20 DIAGNOSIS — H52.4 PRESBYOPIA: ICD-10-CM

## 2020-08-20 DIAGNOSIS — K42.9 UMBILICAL HERNIA WITHOUT OBSTRUCTION AND WITHOUT GANGRENE: ICD-10-CM

## 2020-08-20 DIAGNOSIS — E78.2 MIXED HYPERLIPIDEMIA: ICD-10-CM

## 2020-08-20 DIAGNOSIS — G47.33 OSA (OBSTRUCTIVE SLEEP APNEA): ICD-10-CM

## 2020-08-20 DIAGNOSIS — E11.8 TYPE 2 DIABETES MELLITUS WITH COMPLICATION (HCC): ICD-10-CM

## 2020-08-20 DIAGNOSIS — D12.3 ADENOMATOUS POLYP OF TRANSVERSE COLON: ICD-10-CM

## 2020-08-20 DIAGNOSIS — I10 ESSENTIAL HYPERTENSION WITH GOAL BLOOD PRESSURE LESS THAN 130/85: ICD-10-CM

## 2020-08-20 DIAGNOSIS — C49.A2 GASTROINTESTINAL STROMAL TUMOR (GIST) OF STOMACH (HCC): ICD-10-CM

## 2020-08-20 DIAGNOSIS — F32.1 CURRENT MODERATE EPISODE OF MAJOR DEPRESSIVE DISORDER WITHOUT PRIOR EPISODE (HCC): ICD-10-CM

## 2020-08-20 DIAGNOSIS — Z00.00 PHYSICAL EXAM, ANNUAL: Primary | ICD-10-CM

## 2020-08-20 PROBLEM — Z79.4 TYPE 2 DIABETES MELLITUS WITHOUT COMPLICATION, WITH LONG-TERM CURRENT USE OF INSULIN (HCC): Status: RESOLVED | Noted: 2019-07-18 | Resolved: 2020-08-20

## 2020-08-20 PROBLEM — E11.9 TYPE 2 DIABETES MELLITUS WITHOUT COMPLICATION, WITH LONG-TERM CURRENT USE OF INSULIN (HCC): Status: RESOLVED | Noted: 2019-07-18 | Resolved: 2020-08-20

## 2020-08-20 PROCEDURE — 3008F BODY MASS INDEX DOCD: CPT | Performed by: INTERNAL MEDICINE

## 2020-08-20 PROCEDURE — 96160 PT-FOCUSED HLTH RISK ASSMT: CPT | Performed by: INTERNAL MEDICINE

## 2020-08-20 PROCEDURE — G0439 PPPS, SUBSEQ VISIT: HCPCS | Performed by: INTERNAL MEDICINE

## 2020-08-20 PROCEDURE — 99396 PREV VISIT EST AGE 40-64: CPT | Performed by: INTERNAL MEDICINE

## 2020-08-20 PROCEDURE — 3080F DIAST BP >= 90 MM HG: CPT | Performed by: INTERNAL MEDICINE

## 2020-08-20 PROCEDURE — 3077F SYST BP >= 140 MM HG: CPT | Performed by: INTERNAL MEDICINE

## 2020-08-20 PROCEDURE — G0009 ADMIN PNEUMOCOCCAL VACCINE: HCPCS | Performed by: INTERNAL MEDICINE

## 2020-08-20 PROCEDURE — 90670 PCV13 VACCINE IM: CPT | Performed by: INTERNAL MEDICINE

## 2020-08-20 NOTE — PROGRESS NOTES
Nakul Woodward is a 63year old male who presents for a Medicare Annual Wellness visit.     Patient Care Team: Patient Care Team:  Kash Toledo MD as PCP - General (Internal Medicine)  S[facility] Mac[facility]  Desmond Castillo LCSW as Danielle Rivera Oral Cap Take 100 mg by mouth 2 (two) times daily as needed for constipation.  20 capsule 0   • Pantoprazole Sodium 40 MG Oral Tab EC Take 1 tablet (40 mg total) by mouth every morning before breakfast. 30 tablet 0   • Insulin NPH & Regular (HUMULIN 70/30 K 158 160 142 194 181   Triglycerides 30 - 149 mg/dL 57 85 60 52 94 57   HDL 40 - 59 mg/dL 38(L) 45 47 42 35 44   LDL <100 mg/dL 87 96 101(H) 90 140(H) 126(H)     BUN and Cr Latest Ref Rng & Units 8/15/2020 2/6/2020 2/5/2020 2/3/2020 4/4/2018 3/1/2018 12/1/2 No  Vision Problems? : No  Difficulty walking?: Yes  Difficulty dressing or bathing?: No  Problems with daily activities? : No  Memory Problems?: No      Fall/Risk Assessment     Have you fallen in the last 12 months?: 0-No  Do you have 3 or more medical c Ophthalmology Visit Annually    Immunizations     Zoster (Not covered by Medicare Part B) No orders found for this or any previous visit.      SPECIFIC DISEASE MONITORING Internal Lab or Procedure     Diabetes     HgbA1C  Annually HEMOGLOBIN A1C (%)   Date Nissa Bertrand MD at [facility]. (EUS) N/A 2/22/2017    Performed by Solange Jasso MD at [address] [place] [address] ENDOSCOPY   • ESOPHAGOGASTRODUODENOSCOPY (EGD) N/A 2/22/2017    Performed by Solange Jasso MD at [address] [place] [address] ENDOSCOPY   • OTHER      LYMPH NODE (!) 389 lb (176.4 kg)   BMI 55.82 kg/m²    > BP Readings from Last 3 Encounters:  08/20/20 : (!) 161/95  07/02/20 : 134/87  02/10/20 : 104/70    GENERAL: well developed, well nourished, in no apparent distress, morbidly obese  SKIN: no rashes, no suspiciou Ophthalmology, check feet daily. 4. Asthma with COPD (chronic obstructive pulmonary disease) (HCC)     Take medicines including inhalers as directed. Avoid smoking or being in smoky places. The patient presents for a recheck of asthma sx's.  Asthma is un Regular exercise at least 3 times weekly will help to curb one's appetite, control weight and lead to better blood pressure control. Record blood pressures at home and bring readings to your next office visit to review.     9. BMI 50.0-59.9, adult ([facility] [address] condition. Follow up with specialist monitoring condition as needed. 15. Umbilical hernia without obstruction and without gangrene    Has an umbilical hernia. Advised to see surgery if size or pain increases    17.  Nasal obstruction    Feels a blockage

## 2020-08-21 ENCOUNTER — TELEPHONE (OUTPATIENT)
Dept: INTERNAL MEDICINE CLINIC | Facility: CLINIC | Age: 55
End: 2020-08-21

## 2020-08-21 NOTE — TELEPHONE ENCOUNTER
Patient calling with injection site pain to his left arm from pneumonia vaccination yesterday. Asking if this is normal. Denies redness and irritation. States it is hard to use the arm due to the discomfort.  Advised ice and Tylenol for comfort relief and t

## 2020-08-24 ENCOUNTER — APPOINTMENT (OUTPATIENT)
Dept: ENDOCRINOLOGY | Facility: HOSPITAL | Age: 55
End: 2020-08-24
Attending: NURSE PRACTITIONER
Payer: MEDICARE

## 2021-01-15 RX ORDER — ATORVASTATIN CALCIUM 20 MG/1
TABLET, FILM COATED ORAL
Qty: 90 TABLET | Refills: 0 | Status: SHIPPED | OUTPATIENT
Start: 2021-01-15 | End: 2021-09-16

## 2021-03-01 RX ORDER — LISINOPRIL AND HYDROCHLOROTHIAZIDE 25; 20 MG/1; MG/1
1 TABLET ORAL DAILY
Qty: 90 TABLET | Refills: 0 | Status: SHIPPED | OUTPATIENT
Start: 2021-03-01 | End: 2021-12-13

## 2021-03-11 RX ORDER — METFORMIN HYDROCHLORIDE 500 MG/1
TABLET, EXTENDED RELEASE ORAL
Qty: 180 TABLET | Refills: 0 | Status: SHIPPED | OUTPATIENT
Start: 2021-03-11 | End: 2021-07-15

## 2021-03-17 DIAGNOSIS — Z23 NEED FOR VACCINATION: ICD-10-CM

## 2021-07-13 NOTE — TELEPHONE ENCOUNTER
Pharmacy refill request for:       •  METFORMIN HCL  MG Oral Tablet 24 Hr, TAKE 1 TABLET BY MOUTH TWICE DAILY WITH MEALS, Disp: 180 tablet, Rfl: 0

## 2021-07-13 NOTE — TELEPHONE ENCOUNTER
Current Outpatient Medications:     •  Lisinopril-hydroCHLOROthiazide 20-25 MG Oral Tab, Take 1 tablet by mouth daily. , Disp: 90 tablet, Rfl: 0  •  amLODIPine Besylate 5 MG Oral Tab, TAKE 1 TABLET BY MOUTH EVERY DAY, Disp: 90 tablet, Rfl: 1

## 2021-07-14 NOTE — TELEPHONE ENCOUNTER
Please schedule epic video or office visit (assuming feeling well) to discuss this medication, thanks.

## 2021-07-15 RX ORDER — METFORMIN HYDROCHLORIDE 500 MG/1
500 TABLET, EXTENDED RELEASE ORAL 2 TIMES DAILY WITH MEALS
Qty: 60 TABLET | Refills: 0 | Status: SHIPPED | OUTPATIENT
Start: 2021-07-15 | End: 2021-07-16

## 2021-07-15 NOTE — TELEPHONE ENCOUNTER
LOV 07/02/20. RTC 3 months. No f/u. Called to schedule f/u apt. LMTCb. Sent [employer] message also. Pended 30 day supply.

## 2021-07-16 NOTE — TELEPHONE ENCOUNTER
LOV: 07/02/20 RTC 3 months    No future appointments. Metformin was refilled yesterday 7/15/21 for 30 days.   Message from pharmacy received for 90 days supply  Patient read [employer] message sent on 3/9/21 regarding making an appointment, another one sent o

## 2021-07-19 RX ORDER — METFORMIN HYDROCHLORIDE 500 MG/1
TABLET, EXTENDED RELEASE ORAL
Qty: 180 TABLET | Refills: 0 | Status: SHIPPED | OUTPATIENT
Start: 2021-07-19 | End: 2021-12-13

## 2021-07-23 NOTE — TELEPHONE ENCOUNTER
A no response letter was sent in the mail. The patient does not read "[employer] messages and we are unable to contact her. I am unable to refuse the medications. Please advise.

## 2021-07-29 RX ORDER — LISINOPRIL AND HYDROCHLOROTHIAZIDE 25; 20 MG/1; MG/1
1 TABLET ORAL DAILY
Qty: 90 TABLET | Refills: 1 | OUTPATIENT
Start: 2021-07-29

## 2021-07-29 RX ORDER — AMLODIPINE BESYLATE 5 MG/1
TABLET ORAL
Qty: 90 TABLET | Refills: 1 | OUTPATIENT
Start: 2021-07-29

## 2021-08-31 ENCOUNTER — TELEPHONE (OUTPATIENT)
Dept: INTERNAL MEDICINE CLINIC | Facility: CLINIC | Age: 56
End: 2021-08-31

## 2021-08-31 NOTE — TELEPHONE ENCOUNTER
Reached patient for medication adherence consult. Patient is past due for refills on several of his maintenance medications. He states he still has about 2 weeks left of his medications. Patient denies forgetting or missing medications.      Did provide

## 2021-09-16 RX ORDER — ATORVASTATIN CALCIUM 20 MG/1
TABLET, FILM COATED ORAL
Qty: 30 TABLET | Refills: 0 | Status: SHIPPED | OUTPATIENT
Start: 2021-09-16 | End: 2021-10-13

## 2021-09-16 NOTE — TELEPHONE ENCOUNTER
LOV 7/2/20 with me    Has not followed up since. Please call and assist patient to schedule apt. 30 day supply refill sent to pharmacy. Thank you!

## 2021-10-13 RX ORDER — ATORVASTATIN CALCIUM 20 MG/1
TABLET, FILM COATED ORAL
Qty: 90 TABLET | Refills: 0 | Status: SHIPPED | OUTPATIENT
Start: 2021-10-13 | End: 2021-12-13

## 2021-11-23 ENCOUNTER — OFFICE VISIT (OUTPATIENT)
Dept: ENDOCRINOLOGY CLINIC | Facility: CLINIC | Age: 56
End: 2021-11-23
Payer: MEDICARE

## 2021-11-23 VITALS
SYSTOLIC BLOOD PRESSURE: 116 MMHG | DIASTOLIC BLOOD PRESSURE: 68 MMHG | BODY MASS INDEX: 59 KG/M2 | HEART RATE: 92 BPM | WEIGHT: 315 LBS

## 2021-11-23 DIAGNOSIS — Z79.4 UNCONTROLLED TYPE 2 DIABETES MELLITUS WITH COMPLICATION, WITH LONG-TERM CURRENT USE OF INSULIN (HCC): Primary | ICD-10-CM

## 2021-11-23 DIAGNOSIS — E11.8 UNCONTROLLED TYPE 2 DIABETES MELLITUS WITH COMPLICATION, WITH LONG-TERM CURRENT USE OF INSULIN (HCC): Primary | ICD-10-CM

## 2021-11-23 DIAGNOSIS — E11.65 UNCONTROLLED TYPE 2 DIABETES MELLITUS WITH COMPLICATION, WITH LONG-TERM CURRENT USE OF INSULIN (HCC): Primary | ICD-10-CM

## 2021-11-23 PROCEDURE — 3074F SYST BP LT 130 MM HG: CPT | Performed by: NURSE PRACTITIONER

## 2021-11-23 PROCEDURE — 3078F DIAST BP <80 MM HG: CPT | Performed by: NURSE PRACTITIONER

## 2021-11-23 PROCEDURE — 83036 HEMOGLOBIN GLYCOSYLATED A1C: CPT | Performed by: NURSE PRACTITIONER

## 2021-11-23 PROCEDURE — 36416 COLLJ CAPILLARY BLOOD SPEC: CPT | Performed by: NURSE PRACTITIONER

## 2021-11-23 PROCEDURE — 99214 OFFICE O/P EST MOD 30 MIN: CPT | Performed by: NURSE PRACTITIONER

## 2021-11-23 PROCEDURE — 3051F HG A1C>EQUAL 7.0%<8.0%: CPT | Performed by: NURSE PRACTITIONER

## 2021-11-23 PROCEDURE — 82947 ASSAY GLUCOSE BLOOD QUANT: CPT | Performed by: NURSE PRACTITIONER

## 2021-11-23 RX ORDER — EMPAGLIFLOZIN 25 MG/1
25 TABLET, FILM COATED ORAL DAILY
Qty: 90 TABLET | Refills: 0 | Status: SHIPPED | OUTPATIENT
Start: 2021-11-23

## 2021-11-23 RX ORDER — HUMAN INSULIN 100 [IU]/ML
INJECTION, SUSPENSION SUBCUTANEOUS
Qty: 137 ML | Refills: 0 | Status: SHIPPED | OUTPATIENT
Start: 2021-11-23 | End: 2022-02-21

## 2021-11-23 NOTE — PATIENT INSTRUCTIONS
A1C: 7.6% today --> decreased from 8.5% on 8/15/2020  Blood glucose: 142 in clinic today    Medications:   -increase Metformin 500mg--> 1,000mg with breakfast      500mg--> 1,000mg with dinner   -start Jardiance 25mg once daily in AM (before breakfast)   P

## 2021-11-23 NOTE — PROGRESS NOTES
Name: Charlotte Melissa  Date: 11/23/2021    CHIEF COMPLAINT   No chief complaint on file. HISTORY OF PRESENT ILLNESS   Charlotte Melissa is a 61year old male who presents for follow up on diabetes management. HbA1C: 7.6% at [address] today.  This is a decr COMPLIANCE:  B= 2 boiled eggs and 1 bowl of oatmeal with monk sugar and 1/2 peter butter and salad with 16 oz of water and 1 black coffee   L= salad with either chicken or tuna or soup (ramen)  D= usually on the go due to changes in schedule between him an BLOOD SUGARS 3 TO 4 TIMES DAILY. , Disp: 400 strip, Rfl: 0  •  docusate sodium 100 MG Oral Cap, Take 100 mg by mouth 2 (two) times daily as needed for constipation. , Disp: 20 capsule, Rfl: 0  •  Pantoprazole Sodium 40 MG Oral Tab EC, Take 1 tablet (40 mg to Marital status:     Tobacco Use      Smoking status: Never Smoker      Smokeless tobacco: Never Used    Vaping Use      Vaping Use: Never used    Substance and Sexual Activity      Alcohol use: Yes        Comment: Occasionally      Drug use:  No weight gain or loss  Head: Atraumatic  Eyes:  normal conjunctivae, sclera. , normal sclera and normal pupils  Throat/Neck: normal sound to voice. Normal hearing, normal speech  Back: no kyphosis  Respiratory:  Speaking in full sentences, non-labored.  no inc able.   -reviewed target goal BG readings and A1C  -reviewed when to call and notify me of abnormal BG readings. -patient thinking about having knee surgery. Discussed goal to maintained A1C <8.0% for surgery clearance.    -reviewed increased risk of comp

## 2021-12-13 ENCOUNTER — OFFICE VISIT (OUTPATIENT)
Dept: INTERNAL MEDICINE CLINIC | Facility: CLINIC | Age: 56
End: 2021-12-13
Payer: MEDICARE

## 2021-12-13 VITALS
BODY MASS INDEX: 45.1 KG/M2 | OXYGEN SATURATION: 95 % | WEIGHT: 315 LBS | RESPIRATION RATE: 16 BRPM | SYSTOLIC BLOOD PRESSURE: 118 MMHG | HEART RATE: 103 BPM | HEIGHT: 70 IN | DIASTOLIC BLOOD PRESSURE: 77 MMHG

## 2021-12-13 DIAGNOSIS — E11.8 TYPE 2 DIABETES MELLITUS WITH COMPLICATION (HCC): ICD-10-CM

## 2021-12-13 DIAGNOSIS — Z12.5 SCREENING PSA (PROSTATE SPECIFIC ANTIGEN): ICD-10-CM

## 2021-12-13 DIAGNOSIS — E78.2 MIXED HYPERLIPIDEMIA: ICD-10-CM

## 2021-12-13 DIAGNOSIS — I10 ESSENTIAL HYPERTENSION WITH GOAL BLOOD PRESSURE LESS THAN 130/85: Primary | ICD-10-CM

## 2021-12-13 DIAGNOSIS — M17.12 OSTEOARTHRITIS OF LEFT KNEE, UNSPECIFIED OSTEOARTHRITIS TYPE: ICD-10-CM

## 2021-12-13 PROBLEM — J34.89 NASAL OBSTRUCTION: Status: RESOLVED | Noted: 2019-07-18 | Resolved: 2021-12-13

## 2021-12-13 PROCEDURE — 3074F SYST BP LT 130 MM HG: CPT | Performed by: INTERNAL MEDICINE

## 2021-12-13 PROCEDURE — 3008F BODY MASS INDEX DOCD: CPT | Performed by: INTERNAL MEDICINE

## 2021-12-13 PROCEDURE — 3078F DIAST BP <80 MM HG: CPT | Performed by: INTERNAL MEDICINE

## 2021-12-13 PROCEDURE — 99214 OFFICE O/P EST MOD 30 MIN: CPT | Performed by: INTERNAL MEDICINE

## 2021-12-13 RX ORDER — ATORVASTATIN CALCIUM 20 MG/1
20 TABLET, FILM COATED ORAL NIGHTLY
Qty: 90 TABLET | Refills: 1 | Status: SHIPPED | OUTPATIENT
Start: 2021-12-13

## 2021-12-13 RX ORDER — LISINOPRIL AND HYDROCHLOROTHIAZIDE 25; 20 MG/1; MG/1
1 TABLET ORAL DAILY
Qty: 90 TABLET | Refills: 1 | Status: SHIPPED | OUTPATIENT
Start: 2021-12-13

## 2021-12-13 RX ORDER — AMLODIPINE BESYLATE 5 MG/1
TABLET ORAL
Qty: 90 TABLET | Refills: 1 | Status: SHIPPED | OUTPATIENT
Start: 2021-12-13

## 2021-12-13 NOTE — PROGRESS NOTES
Willam Stephens is a 61year old male. Patient presents with:  Establish Care: NP here to establish care     HPI:   25-year-old gentleman with a medical history significant for diabetes, hypertension, dyslipidemia, DJD severe, morbid obesity here to estab sugars ac and hs 120 each 0   • acetaminophen 325 MG Oral Tab Take 2 tablets (650 mg total) by mouth every 6 (six) hours as needed. 80 tablet 0   • oxymetazoline 0.05 % Nasal Solution 1 spray by Nasal route nightly as needed for congestion.         Past Med • Psychiatric Mother         dementia   • Diabetes Sister    • Obesity Sister    • Cancer Maternal Grandfather         unknown cancer   • Other (Other) Sister         FIBROMIALGIA   • Glaucoma Neg       No Known Allergies     REVIEW OF SYSTEMS:   Review He is alert and oriented to person, place, and time. Mental status is at baseline. Psychiatric:         Mood and Affect: Mood normal.         Behavior: Behavior normal.   Crepitus present bilaterally    ASSESSMENT AND PLAN:   1.  Essential hypertension wi discrepancies may still exist.

## 2021-12-26 ENCOUNTER — PATIENT MESSAGE (OUTPATIENT)
Dept: ENDOCRINOLOGY CLINIC | Facility: CLINIC | Age: 56
End: 2021-12-26

## 2021-12-27 RX ORDER — METFORMIN HYDROCHLORIDE 500 MG/1
1000 TABLET, EXTENDED RELEASE ORAL 2 TIMES DAILY WITH MEALS
Qty: 360 TABLET | Refills: 0 | Status: SHIPPED | OUTPATIENT
Start: 2021-12-27

## 2021-12-27 NOTE — TELEPHONE ENCOUNTER
From: Willam Stephens  To: URBANO Ruth  Sent: 12/26/2021 8:52 PM CST  Subject: Prescription Refill    I am low on Metformin. MY CHART WILL NOT ALLOW ME TO REFILL AT THIS TIME. ..

## 2022-01-12 ENCOUNTER — TELEPHONE (OUTPATIENT)
Dept: CASE MANAGEMENT | Age: 57
End: 2022-01-12

## 2022-01-12 NOTE — TELEPHONE ENCOUNTER
Patient is eligible for a 2022 Medicare Annual Wellness visit. This visit can be scheduled any time in the calendar year. Patients phone number not in service. Msg left on wife's mobile voicemail to call back.

## 2022-05-19 ENCOUNTER — TELEPHONE (OUTPATIENT)
Dept: GASTROENTEROLOGY | Facility: CLINIC | Age: 57
End: 2022-05-19

## 2022-05-19 NOTE — TELEPHONE ENCOUNTER
Received recall through staff messages. Patient is not due for next colonoscopy until 6/2029. Patient outreach entered for colonoscopy recall for 2029.

## 2022-05-19 NOTE — TELEPHONE ENCOUNTER
----- Message from Sameera Colon CMA sent at 6/25/2019 11:36 AM CDT -----  Regarding: 3 yr CLN recall w/Dr Chloe Cee  Recall colon in 3 years per.  Colon done 6/19/19 w/Dr SO    repeat your colonoscopy in 10 years that is in June 2029

## 2022-10-07 RX ORDER — ATORVASTATIN CALCIUM 20 MG/1
20 TABLET, FILM COATED ORAL NIGHTLY
Qty: 90 TABLET | Refills: 1 | Status: SHIPPED | OUTPATIENT
Start: 2022-10-07

## 2022-10-07 RX ORDER — AMLODIPINE BESYLATE 5 MG/1
TABLET ORAL
Qty: 90 TABLET | Refills: 1 | Status: SHIPPED | OUTPATIENT
Start: 2022-10-07

## 2022-10-07 RX ORDER — LISINOPRIL AND HYDROCHLOROTHIAZIDE 25; 20 MG/1; MG/1
1 TABLET ORAL DAILY
Qty: 90 TABLET | Refills: 1 | Status: SHIPPED | OUTPATIENT
Start: 2022-10-07

## 2022-10-07 NOTE — TELEPHONE ENCOUNTER
Please review; protocol failed/no protocol. Requested Prescriptions   Pending Prescriptions Disp Refills    AMLODIPINE 5 MG Oral Tab [Pharmacy Med Name: AMLODIPINE BESYLATE 5MG TABLETS] 90 tablet 1     Sig: TAKE 1 TABLET BY MOUTH EVERY DAY        Hypertensive Medications Protocol Failed - 10/6/2022  5:50 AM        Failed - CMP or BMP in past 6 months     No results found for this or any previous visit (from the past 4392 hour(s)).               Failed - In person appointment or virtual visit in the past 6 months       Recent Outpatient Visits              9 months ago Essential hypertension with goal blood pressure less than 130/85    [address], [address] Am[address] Sam, MD    Office Visit    10 months ago Uncontrolled type 2 diabetes mellitus with complication, with long-term current use of insulin [facility])    [address] [facility] URBANO Polanco    Office Visit    2 years ago Physical exam, annual    [address], [address] [facility] [address], Abad Gaxiola MD    Office Visit    2 years ago Uncontrolled type 2 diabetes mellitus with hyperglycemia [facility])    [address] [facility] URBANO Polanco    Office Visit    2 years ago Type 2 diabetes mellitus without complication, with long-term current use of insulin [facility])    [address], [address] [facility] [address], Abad Gaxiola MD    Office Visit                 Failed - EGFRCR or GFRAA > 50     GFR Evaluation              Passed - In person appointment in the past 12 or next 3 months       Recent Outpatient Visits              9 months ago Essential hypertension with goal blood pressure less than 130/85    [address], [address] Luis MD    Office Visit    10 months ago Uncontrolled type 2 diabetes mellitus with complication, with long-term current use of insulin [facility])    [address] [facility] URBANO Polanco    Office Visit    2 years ago Physical exam, annual    [facility] Martin Vidal MD    Office Visit    2 years ago Uncontrolled type 2 diabetes mellitus with hyperglycemia [facility])    [facility] Endocrinology URBANO Gerardo    Office Visit    2 years ago Type 2 diabetes mellitus without complication, with long-term current use of insulin [facility])    [facility] [address], Martin Davenport MD    Office Visit                 Passed - Last BP reading less than 140/90     BP Readings from Last 1 Encounters:  12/13/21 : 118/77                   LISINOPRIL-HYDROCHLOROTHIAZIDE 20-25 MG Oral Tab [Pharmacy Med Name: LISINOPRIL-HCTZ 20/25MG TABLETS] 90 tablet 1     Sig: Take 1 tablet by mouth daily. Hypertensive Medications Protocol Failed - 10/6/2022  5:50 AM        Failed - CMP or BMP in past 6 months     No results found for this or any previous visit (from the past 4392 hour(s)).               Failed - In person appointment or virtual visit in the past 6 months       Recent Outpatient Visits              9 months ago Essential hypertension with goal blood pressure less than 130/85    [facility] [address] Luis MD    Office Visit    10 months ago Uncontrolled type 2 diabetes mellitus with complication, with long-term current use of insulin [facility])    [facility] Endocrinology URBANO Gerardo    Office Visit    2 years ago Physical exam, annual    [facility] [address], Chantal Gaxiola MD    Office Visit    2 years ago Uncontrolled type 2 diabetes mellitus with hyperglycemia [facility])    [facility] Endocrinology URBANO Gerardo    Office Visit    2 years ago Type 2 diabetes mellitus without complication, with long-term current use of insulin [facility])    [facility] [address], Martin Davenport MD    Office Visit                 Failed - EGFRCR or GFRAA > 50     GFR Evaluation              Passed - In person appointment in the past 12 or next 3 months       Recent Outpatient Visits              9 months ago Essential hypertension with goal blood pressure less than 130/85    [address], [address] Ranee Mako, MD    Office Visit    10 months ago Uncontrolled type 2 diabetes mellitus with complication, with long-term current use of insulin [facility])    [address] [facility] Griselda Pickett, APRN    Office Visit    2 years ago Physical exam, annual    [address], [address], [place] Cobos MD    Office Visit    2 years ago Uncontrolled type 2 diabetes mellitus with hyperglycemia [facility])    [address] [facility] Griselda Pickett, APRN    Office Visit    2 years ago Type 2 diabetes mellitus without complication, with long-term current use of insulin [facility])    [address], [address], Reina Moore MD    Office Visit                 Passed - Last BP reading less than 140/90     BP Readings from Last 1 Encounters:  12/13/21 : 118/77                    Recent Outpatient Visits              9 months ago Essential hypertension with goal blood pressure less than 130/85    [address], [address] Radhika Luis MD    Office Visit    10 months ago Uncontrolled type 2 diabetes mellitus with complication, with long-term current use of insulin [facility])    [address] [facility] Griselda Pickett, APRN    Office Visit    2 years ago Physical exam, annual    [address], [address], [place] Cobos MD    Office Visit    2 years ago Uncontrolled type 2 diabetes mellitus with hyperglycemia [facility])    [address] [facility] Griselda Pickett, APRN    Office Visit    2 years ago Type 2 diabetes mellitus without complication, with long-term current use of insulin [facility])    [address], [address], [facility]    Office Visit

## 2022-10-07 NOTE — TELEPHONE ENCOUNTER
Please review; protocol failed/no protocol. Requested Prescriptions   Pending Prescriptions Disp Refills    atorvastatin 20 MG Oral Tab 90 tablet 1     Sig: Take 1 tablet (20 mg total) by mouth nightly.         Cholesterol Medication Protocol Failed - 10/6/2022 10:31 AM        Failed - ALT in past 12 months        Failed - LDL in past 12 months        Failed - Last ALT < 80       Lab Results   Component Value Date     (H) 08/15/2020             Failed - Last LDL < 130     Lab Results   Component Value Date    LDL 87 08/15/2020               Passed - In person appointment or virtual visit in the past 12 mos or appointment in next 3 mos       Recent Outpatient Visits              9 months ago Essential hypertension with goal blood pressure less than 130/85    [address], [address] Con Dense, MD    Office Visit    10 months ago Uncontrolled type 2 diabetes mellitus with complication, with long-term current use of insulin [facility])    [address] End[facility] URBANO Murphy    Office Visit    2 years ago Physical exam, annual    [address], [address], [facility], Julieta Acevedo MD    Office Visit    2 years ago Uncontrolled type 2 diabetes mellitus with hyperglycemia [facility])    [address] Endocr[facility]ology URBANO Murphy    Office Visit    2 years ago Type 2 diabetes mellitus without complication, with long-term current use of insulin [facility])    [facility], [address], [facility], Julieta Acevedo MD    Office Visit                     Recent Outpatient Visits              9 months ago Essential hypertension with goal blood pressure less than 130/85    [address], [address] José Manuel Luis MD    Office Visit    10 months ago Uncontrolled type 2 diabetes mellitus with complication, with long-term current use of insulin [facility])    [address] End[facility] URBANO Murphy    Office Visit    2 years ago Physical exam, annual [facility] [place] Darren Stinson MD    Office Visit    2 years ago Uncontrolled type 2 diabetes mellitus with hyperglycemia [facility])    [address] [facility] URBANO Foley    Office Visit    2 years ago Type 2 diabetes mellitus without complication, with long-term current use of insulin [facility])    [address], [address] [facility],[address], [place] Mckenzie Dyer MD    Office Visit

## 2023-05-09 ENCOUNTER — MED REC SCAN ONLY (OUTPATIENT)
Dept: INTERNAL MEDICINE CLINIC | Facility: CLINIC | Age: 58
End: 2023-05-09

## 2023-11-17 ENCOUNTER — PATIENT OUTREACH (OUTPATIENT)
Dept: CASE MANAGEMENT | Age: 58
End: 2023-11-17

## 2023-11-17 ENCOUNTER — TELEPHONE (OUTPATIENT)
Facility: CLINIC | Age: 58
End: 2023-11-17

## 2023-11-17 NOTE — TELEPHONE ENCOUNTER
Called patient regarding care gaps and he stated he has a different PCP. His no longer under Dr.Joseph Greg ramírez.

## 2023-11-17 NOTE — PROCEDURES
The office order for PCP removal request is Approved and finalized on November 17, 2023.     [facility] (IP[facility]): [address]      Thanks,  [id number] [facility] Team

## 2025-05-27 ENCOUNTER — HOSPITAL ENCOUNTER (EMERGENCY)
Facility: HOSPITAL | Age: 60
Discharge: HOME | End: 2025-05-27
Attending: EMERGENCY MEDICINE
Payer: MEDICARE

## 2025-05-27 VITALS
WEIGHT: 315 LBS | RESPIRATION RATE: 18 BRPM | TEMPERATURE: 98 F | DIASTOLIC BLOOD PRESSURE: 84 MMHG | BODY MASS INDEX: 57 KG/M2 | SYSTOLIC BLOOD PRESSURE: 134 MMHG | HEART RATE: 91 BPM | OXYGEN SATURATION: 97 %

## 2025-05-27 DIAGNOSIS — K59.00 CONSTIPATION, UNSPECIFIED CONSTIPATION TYPE: Primary | ICD-10-CM

## 2025-05-27 PROCEDURE — 99283 EMERGENCY DEPT VISIT LOW MDM: CPT

## 2025-05-27 RX ORDER — POLYETHYLENE GLYCOL 3350, SODIUM SULFATE ANHYDROUS, SODIUM BICARBONATE, SODIUM CHLORIDE, POTASSIUM CHLORIDE 236; 22.74; 6.74; 5.86; 2.97 G/4L; G/4L; G/4L; G/4L; G/4L
4000 POWDER, FOR SOLUTION ORAL ONCE
Qty: 4000 ML | Refills: 0 | Status: SHIPPED | OUTPATIENT
Start: 2025-05-27 | End: 2025-05-27

## 2025-05-27 NOTE — DISCHARGE INSTRUCTIONS
What are my instructions for my bowel purge?  1. Mix the bowel prep solution (follow the instructions on the container).   2. Start your bowel purge prep in the morning by drinking one 8 ounce (oz)  glass of the prep solution. Continue drinking one 8 oz glass about every   15 minutes (or as tolerated to prevent nausea) until 2 liters (64 oz) of the   prep solution is gone.  3. You can either drink the last 2 liters of prep solution around 5:00-6:00   PM the same day, or you can drink them the next day.   Follow the same drinking schedule of one 8 oz glass every 15 minutes until all the prep   solution is gone.    What can I eat while completing the bowel purge?  You do not have to stop eating during the bowel purge (since you are not   preparing for a procedure), but we recommend that you eat lightly. The goal is   to get your colon mostly cleared out. If it is not cleared out enough, you may   need to get a prescription for another 4 liters of bowel prep solution.

## 2025-05-27 NOTE — ED QUICK NOTES
Pt unable to tolerate enema tube insertion, unable advance, edema leaking. Pt requested to stop enema. Dr. Girard updated.

## 2025-05-27 NOTE — ED PROVIDER NOTES
Patient Seen in: [facility] Emergency Department        History  Chief Complaint   Patient presents with    Constipation     Stated Complaint: constipation    Subjective:   HPI            60-year-old male with history of GIST tumor s/p resection, obesity, hypertension, high cholesterol, diabetes, who presents for evaluation of constipation.  He has not had a bowel movement in 3 days.  He normally has bowel movements daily.  He started OTC stool softeners 2 days ago without improvement.  He feels pressure and a firm piece of stool in the rectum.  No abdominal discomfort.  No nausea or vomiting.  No blood per rectum.    Objective:     No pertinent past medical history.            No pertinent past surgical history.              No pertinent social history.                              Physical Exam    ED Triage Vitals [05/27/25 0816]   /67   Pulse 101   Resp 18   Temp 98 °F (36.7 °C)   Temp src    SpO2 98 %   O2 Device None (Room air)       Current Vitals:   Vital Signs  BP: 134/84  Pulse: 91  Resp: 18  Temp: 98 °F (36.7 °C)  MAP (mmHg): 82    Oxygen Therapy  SpO2: 97 %  O2 Device: None (Room air)            Physical Exam  Vitals and nursing note reviewed.   Constitutional:       Appearance: He is well-developed. He is obese.   HENT:      Head: Normocephalic and atraumatic.   Eyes:      Extraocular Movements: Extraocular movements intact.   Cardiovascular:      Rate and Rhythm: Normal rate and regular rhythm.      Heart sounds: Normal heart sounds.   Pulmonary:      Effort: Pulmonary effort is normal.      Breath sounds: Normal breath sounds.   Abdominal:      General: There is no distension.      Palpations: Abdomen is soft.      Tenderness: There is no abdominal tenderness. There is no right CVA tenderness or left CVA tenderness.   Genitourinary:     Comments: ED RN Trish present during exam.  Normal rectal tone, firm piece of stool in the rectum manually broken up  Musculoskeletal:         General:  Normal range of motion.      Cervical back: Normal range of motion.   Skin:     General: Skin is warm.   Neurological:      Mental Status: He is alert.      Comments: No focal deficits       Differential diagnosis includes but is not limited to constipation, less likely SBO versus ileus versus viscus perforation        ED Course  Labs Reviewed - No data to display                     Medical Decision Making  Vital stable in the ED.  Abdomen is soft.  No vomiting.  No distention.  Low suspicion for intra-abdominal acute surgical pathology.  Patient did not tolerate enema in the ED.  Discussed GoLytely bowel purge protocol, good hydration, dietary modifications.  Return precautions provided and discussed and patient comfortable with this plan.    Problems Addressed:  Constipation, unspecified constipation type: complicated acute illness or injury with systemic symptoms    Amount and/or Complexity of Data Reviewed  Labs:      Details: BMP, CBC considered though deferred  Radiology:      Details: CT abdomen pelvis considered though deferred    Risk  Prescription drug management.  Decision regarding hospitalization.  Risk Details: No clear indication for hospitalization at this time        Disposition and Plan     Clinical Impression:  1. Constipation, unspecified constipation type         Disposition:  Discharge  5/27/2025  9:47 am    Follow-up:  your PCP in 1 week    Follow up            Medications Prescribed:  Discharge Medication List as of 5/27/2025  9:55 AM        START taking these medications    Details   polyethylene glycol, PEG 3350-KCl-NaBcb-NaCl-NaSulf, (GOLYTELY) 236 g Oral Recon Soln Take 4,000 mL by mouth once for 1 dose., Normal, Disp-4000 mL, R-0                   Supplementary Documentation:

## 2025-05-27 NOTE — ED INITIAL ASSESSMENT (HPI)
Patient complains of constipation for 2-3 days, states he feels like there is a \"lump\" in his rectum

## (undated) DEVICE — MASK PROC W/VISOR ANTIGLARE

## (undated) DEVICE — DRAIN RESERVOIR RELIAVAC 100CC

## (undated) DEVICE — SUTURE SILK 0

## (undated) DEVICE — SUTURE VICRYL 3-0 J442H

## (undated) DEVICE — ECHOTIP ULTRA: Brand: ECHOTIP

## (undated) DEVICE — 35 ML SYRINGE REGULAR TIP: Brand: MONOJECT

## (undated) DEVICE — BLAKE SILICONE DRAIN, 15 FR ROUND, HUBLESS WITH 3/16" TROCAR: Brand: BLAKE

## (undated) DEVICE — FENESTRATED BIPOLAR FORCEPS: Brand: ENDOWRIST;DAVINCI SI

## (undated) DEVICE — 3M™ STERI-DRAPE™ INSTRUMENT POUCH 1018L: Brand: STERI-DRAPE™

## (undated) DEVICE — REM POLYHESIVE ADULT PATIENT RETURN ELECTRODE: Brand: VALLEYLAB

## (undated) DEVICE — SNARE LARIAT HEXAGONAL 10X28

## (undated) DEVICE — MONOPOLAR CURVED SCISSORS: Brand: ENDOWRIST;DAVINCI SI

## (undated) DEVICE — ABDOMINAL BINDER: Brand: DEROYAL

## (undated) DEVICE — STAPLER 45 RELOAD: Brand: ENDOWRIST

## (undated) DEVICE — Device

## (undated) DEVICE — GOWN SURG AERO BLUE PERF LG

## (undated) DEVICE — UNDYED BRAIDED (POLYGLACTIN 910), SYNTHETIC ABSORBABLE SUTURE: Brand: COATED VICRYL

## (undated) DEVICE — DECANTER SPIKE TRANSFLOW

## (undated) DEVICE — MEDI-VAC NON-CONDUCTIVE SUCTION TUBING 6MM X 1.8M (6FT.) L: Brand: CARDINAL HEALTH

## (undated) DEVICE — VISUALIZATION SYSTEM: Brand: CLEARIFY

## (undated) DEVICE — LINE MNTR ADLT SET O2 INTMD

## (undated) DEVICE — SOL  .9 1000ML BTL

## (undated) DEVICE — SUCTION CANISTER, 3000CC,SAFELINER: Brand: DEROYAL

## (undated) DEVICE — STAPLER CANNULA SEAL: Brand: ENDOWRIST

## (undated) DEVICE — ENDOSCOPY PACK UPPER: Brand: MEDLINE INDUSTRIES, INC.

## (undated) DEVICE — SUTURE MONOCRYL 4-0 Y935H

## (undated) DEVICE — ENDOSCOPIC ULTRASOUND ASPIRATION NEEDLE: Brand: EXPECT SLIMLINE SL

## (undated) DEVICE — PROXIMATE RH ROTATING HEAD SKIN STAPLERS (35 WIDE) CONTAINS 35 STAINLESS STEEL STAPLES: Brand: PROXIMATE

## (undated) DEVICE — DV OBTURATOR BLADELESS 8MM

## (undated) DEVICE — SOL  .9 1000ML BAG

## (undated) DEVICE — TIP COVER ACCESSORY

## (undated) DEVICE — Device: Brand: DEFENDO AIR/WATER/SUCTION AND BIOPSY VALVE

## (undated) DEVICE — SUCTION/IRRIGATOR: Brand: ENDOWRIST;DAVINCI SI

## (undated) DEVICE — STERILE LATEX POWDER-FREE SURGICAL GLOVESWITH NITRILE COATING: Brand: PROTEXIS

## (undated) DEVICE — STAPLER 45: Brand: ENDOWRIST;DAVINCI SI

## (undated) DEVICE — BIPOLAR FORCEPS CORD,BANANA LEADS: Brand: VALLEYLAB

## (undated) DEVICE — SUTURE PROLENE 1 CT-1

## (undated) DEVICE — CADIERE FORCEPS: Brand: ENDOWRIST;DAVINCI SI

## (undated) DEVICE — SUTURE ETHILON 3-0 669H

## (undated) DEVICE — SNARE OPTMZ PLPCTM TRP

## (undated) DEVICE — DERMABOND LIQUID ADHESIVE

## (undated) DEVICE — VIOLET BRAIDED (POLYGLACTIN 910), SYNTHETIC ABSORBABLE SUTURE: Brand: COATED VICRYL

## (undated) DEVICE — FORCEP RADIAL JAW 4

## (undated) DEVICE — SUTURE SILK 2-0 SA85H

## (undated) DEVICE — GAUZE SPONGES,12 PLY: Brand: CURITY

## (undated) DEVICE — DV KIT ACCESSORY 4-ARM

## (undated) DEVICE — TROCAR: Brand: KII FIOS FIRST ENTRY

## (undated) DEVICE — DEVICE PORT SITE CLOSURE

## (undated) DEVICE — Device: Brand: CUSTOM PROCEDURE KIT

## (undated) DEVICE — X-STREAM LAPAROSCOPIC IRRIGATION TUBING SET WITH NEZHAT-DORSEY TRUMPET VALVE, AND COJOINED SUCTION/IRRIGATION TUBING, WITHOUT PROBE TIP: Brand: X-STREAM

## (undated) DEVICE — CLIP LGT 11MM OPEN 2.8MM 235CM

## (undated) DEVICE — WOUND RETRACTOR AND PROTECTOR: Brand: ALEXIS O WOUND PROTECTOR-RETRACTOR

## (undated) DEVICE — A P RESECTION: Brand: MEDLINE INDUSTRIES, INC.

## (undated) DEVICE — CHLORAPREP 26ML APPLICATOR

## (undated) DEVICE — VESSEL SEALER: Brand: ENDOWRIST;DAVINCI SI

## (undated) DEVICE — SUTURE VICRYL 0 UR-6

## (undated) DEVICE — DRAPE SHEET LAPCHOLE 124X100X7

## (undated) NOTE — LETTER
08/17/19        [facility]      Dear Cy Ceballos,    [facility] records indicate that you have outstanding lab work and or testing that was ordered for you and has not yet been completed:  Orders Placed This Encount

## (undated) NOTE — MR AVS SNAPSHOT
[address] [address]  [phone]               Thank you for choosing us for your health care visit with Wolf Lainez MD.  We are glad to serve you and happy to provide you with this s Take 1 tablet (5 mg total) by mouth daily. What changed:  when to take this   Commonly known as:  NORVASC           amoxicillin 500 MG Tabs   Take 500 mg by mouth 2 (two) times daily. What changed:  Another medication with the same name was removed.  Co Chew 1 tablet (125 mg total) by mouth every 6 (six) hours as needed for FLATULENCE. Commonly known as:  MYLICON           tamsulosin HCl 0.4 MG Caps   Take 1 capsule (0.4 mg total) by mouth daily. Commonly known as:  FLOMAX           * Notice:   This li discharge instructions in [facility] by going to Visits < Admission Summaries. If you've been to the Emergency Department or your doctor's office, you can view your past visit information in [facility] by going to Visits < Visit Summaries. [employer] questions?

## (undated) NOTE — MR AVS SNAPSHOT
[place]  [address]  [phone]               Thank you for choosing us for your health care visit with Tegan Carrero MD.  We are glad to serve you and happy to provide you with this summary Gastroesophageal reflux disease, esophagitis presence not specified          Instructions and Information about Your Health     None      Allergies as of Geoff 10, 2017     No Known Allergies                Today's Vital Signs     BP Pulse Temp Height Weigh Visit [employer]  You can access your MyChart to more actively manage your health care and view more details from this visit by going to [email or web address].   If you've recently had a stay at the Hospital you can access your discharge instructions i track your progress   You don’t need to join a gym. Home exercises work great.  Put more priority on exercise in your life                    Visit [facility] online at  [email or web address]

## (undated) NOTE — LETTER
7/16/2021      [address]  [place] [address]      RE: Appointment Needed for Continuous Medication Refill     Dear Ace Sacks:    Your recent request for a medication refill has been sent to your pharmacy.   A 30

## (undated) NOTE — LETTER
[facility], [address] [place] [address], [address]  [phone]                06/22/19      [address] [facility]        Dear Sue Rose,    I reviewed the pathology repo

## (undated) NOTE — IP AVS SNAPSHOT
[facility]    [address], [facility], [facility] ~ [phone]                Discharge Summary   2/22/2017    Casie Lawson           Admission Information        Provider Department    2/22/2017 Gladis Mclean MD E * Notice: This list has 2 medication(s) that are the same as other medications prescribed for you. Read the directions carefully, and ask your doctor or other care provider to review them with you.       CONTINUE taking these medications     Glucosamine H * MetFORMIN HCl 500 MG Tabs   Commonly known as:  GLUCOPHAGE   What changed:  Another medication with the same name was changed. Make sure you understand how and when to take each.        * MetFORMIN HCl 500 MG Tabs   Commonly known as:  GLUCOPHAGE   Take [address] [address]  [phone]          Follow up with Rufina Amin MD In 2 weeks.     Specialty:  GASTROENTEROLOGY    Why:  As needed    Contact information:    [address] [place] [address] Mi[address]        Future Appointments harming yourself, contact [facility] and Referral Center at [phone]. - If you don’t have insurance, Todd Hurtado has partnered with Patient [address] to help you get signed up for insurance coverage.   Patient [place]

## (undated) NOTE — LETTER
February 23, 2018    Ayesha Crews  [address]    Dear Demetra Mooney: It was a pleasure speaking with you over the phone recently.  To follow up, I wanted to send you some contact information to utilize when you have a que

## (undated) NOTE — LETTER
[address], [place]  Authorization for Invasive Procedures  1.  I hereby authorize Dr. Dk Dawkins , my physician and whomever may be designated as the doctor's assistant, to perform the following operation and/or procedure:  Karmen Swanson performed for the purposes of advancing medicine, science, and/or education, provided my identity is not revealed. If the procedure has been videotaped, the physician/surgeon will obtain the original videotape.  The hospital will not be responsible for stor My signature below affirms that prior to the time of the procedure, I have explained to the patient and/or his legal representative, the risks and benefits involved in the proposed treatment and any reasonable alternative to the proposed treatment.  I have

## (undated) NOTE — LETTER
Hospital Discharge Documentation  Please phone to schedule a hospital follow up appointment.     From: [address] Hospitalist's Office  Phone: [phone]    Patient discharged time/date: 2/22/2017  3:25 PM  Patient discharge disposition:  Home or Self

## (undated) NOTE — LETTER
Hospital Discharge Documentation    From: [address] Hospitalist's Office  Phone: [phone]    Patient discharged time/date: 4/7/2017 12:17 PM  Patient discharge disposition:  Home or Self Care       Discharge Summaries - D/C Summary      Discharge Essential hypertension with goal blood pressure less than 140/90     Mixed hyperlipidemia     Screening for prostate cancer     Age-related nuclear cataract of both eyes     Presbyopia     Ureteral colic     Hyperglycemia     Mass of stomach     Degener Commonly known as:  TYLENOL        Take 2 tablets (650 mg total) by mouth every 6 (six) hours as needed.     Quantity:  80 tablet   Refills:  0       Pantoprazole Sodium 20 MG Tbec   Commonly known as:  PROTONIX        Take 1 tablet (20 mg total) by mouth d Where to Get Your Medications      These medications were sent to [place] [address] [facility], [address] [facility] AT [facility], [phone], [phone]  [address] [place] [address], [address] [place] [id number]     Phone:  21

## (undated) NOTE — MR AVS SNAPSHOT
[address] [facility] [address]  [phone]               Thank you for choosing us for your health care visit with Alvina Orona MD.  We are glad to serve you and happy to provide you with this summary Test blood sugar twice daily as directed   Commonly known as:  FREESTYLE TEST           HYDROcodone-acetaminophen  MG Tabs   Take 1 tablet by mouth every 6 (six) hours as needed for Pain.    Commonly known as:  Nava Austin For medical emergencies, dial 911.            Visit [facility] online at  [email or web address]

## (undated) NOTE — LETTER
March 16, 2018    Angeles Santana  [address]    Dear Susan Lujan: It was a pleasure speaking with you over the phone recently.  To follow up, I wanted to send you some contact information to utilize when you have a questi

## (undated) NOTE — MR AVS SNAPSHOT
[address] [facility]  [phone]               Thank you for choosing us for your health care visit with Alcon Rico MD.  We are glad to serve you and happy to provide you with this summary Assoc Dx:  Primary osteoarthritis of knee, unspecified laterality [M17.10]                 Follow-up Instructions     Return if symptoms worsen or fail to improve.          Referral Details     Referred By    Referred To    Juan Vasquez MD   [address] Current Medications          This list is accurate as of: 3/20/17  2:10 PM.  Always use your most recent med list.                AmLODIPine Besylate 5 MG Tabs   Take 1 tablet (5 mg total) by mouth daily.    What changed:  when to take this   Commonly known * Notice: This list has 2 medication(s) that are the same as other medications prescribed for you. Read the directions carefully, and ask your doctor or other care provider to review them with you.             Medications Administered in the Office Today

## (undated) NOTE — MR AVS SNAPSHOT
[facility] [place]  [address]  [facility], [facility]  [phone]                    After Visit Summary   4/25/2017    Oscar Christophe    MRN: [id number]           Visit Information        Provider Department Dept Phone    4/25 5/25/2017 3:10 PM Merline Lui, Chauncey Gonzales, [address], [facility] Diagnostics Provider Department Dept Phone    4/25/2017 10:30 AM Chalo Humphries [address]  Hematology Oncology [phone]    5/25/2017 3:10 PM Merline Lui,

## (undated) NOTE — MR AVS SNAPSHOT
[facility] [address]  [address]  [address], [address] [facility]  [phone]                    After Visit Summary   2/28/2017    Jerardo Llamas    MRN: [id number]           Visit Information        Provider Department Dept Phone    2/28 Glucose Blood (FREESTYLE TEST) In Vitro Strip Test blood sugar twice daily as directed    MetFORMIN HCl 500 MG Oral Tab Take 1 tablet (500 mg total) by mouth 2 (two) times daily.       Diagnoses for This Visit     Malignant gastrointestinal stromal tumor (

## (undated) NOTE — LETTER
7/23/2021              [address] [place] [address],[address]         Dear Gracie Joe,    This letter is to inform you that our office has made several attempts to reach you by phone without success.   We were attempting to

## (undated) NOTE — LETTER
01/10/19        Lacey Felty  [address]      Dear Nehal Del Rosario,    [address] records indicate that you have outstanding lab work and or testing that was ordered for you and has not yet been completed:  Orders Placed This Encounter

## (undated) NOTE — MR AVS SNAPSHOT
[facility] [place]  [address]  [address], [address] [facility]  [phone]                    After Visit Summary   4/13/2017    Rayray Saha    MRN: [id number]           Visit Information        Provider Department Dept Phone    4/13 Future Appointments        Provider Department    4/18/2017 10:05 AM Kimberlee [facility]    5/25/2017 3:10 PM Melany Oh, Ynes Rm, [address]       Future Appointments Provider Department Dept Phone    4/1

## (undated) NOTE — MR AVS SNAPSHOT
[address] [facility]  [phone]               Thank you for choosing us for your health care visit with Karlos Daniel MD.  We are glad to serve you and happy to provide you with this summary Return for for synvisc one injection left knee (once authorized().      Scheduling Instructions     Monday February 27, 2017     Imaging:  XR KNEE ORTHO SERIES BILAT EM ([id number])    Instructions:   To schedule a test at any [facility] Commonly known as:  AMOXIL           clarithromycin 500 MG Tabs   Take 1 tablet (500 mg total) by mouth 2 (two) times daily.    Commonly known as:  BIAXIN           docusate sodium 100 MG Caps   Take 1 capsule (100 mg total) by mouth 2 (two) times daily as medications prescribed for you. Read the directions carefully, and ask your doctor or other care provider to review them with you.             MyChart     Visit [facility]  You can access your MyChart to more actively manage your health care and view more deta

## (undated) NOTE — MR AVS SNAPSHOT
Stephenie  [address]  [phone]               Thank you for choosing us for your health care visit with [facility], OD.   We are glad to serve you and happy to provide you with this summary of bring your Insurance card, Photo ID, and your medication bottles or a list of your current medication. If your condition improves and this appointment is no longer needed, please contact your physician office to cancel.   Please verify with your primary ca office, you can view your past visit information in [employer] by going to Visits < Visit Summaries. [employer] questions? Call [phone] for help. [employer] is NOT to be used for urgent needs. For medical emergencies, dial 911.            Visit [facility]

## (undated) NOTE — LETTER
06/07/19        [address] [facility]      Dear Shreya Cerda,    [facility] records indicate that you have outstanding lab work and or testing that was ordered for you and has not yet been completed:    Monie Gaston

## (undated) NOTE — MR AVS SNAPSHOT
Cole Beckford   2017 9:30 AM   Office Visit   MRN:  [id number]    Description:  Male : 1965   Provider:  Gabrielle Luna   Department:  [facility] AND [facility] Hematology Oncology              Visit Summary      Primary Visit Diagnosis Glucose Blood (FREESTYLE TEST) In Vitro Strip Test blood sugar twice daily as directed    MetFORMIN HCl 500 MG Oral Tab Take 1 tablet (500 mg total) by mouth 2 (two) times daily.       Patient Instructions     None      Please Note     Please keep your upd Call [phone] for help. MyChart is NOT to be used for urgent needs. For medical emergencies, dial 911.

## (undated) NOTE — IP AVS SNAPSHOT
[address] [facility] [address]  [address] [facility] [phone]                Discharge Summary   4/4/2017    Sisi Mancia           Admission Information        Provider Department    4/4/2017 Sourav Zavaleta MD [facility] 4w/Sw/ - how much to take  - when to take this   Next dose due: Tonight         Take 1 tablet (500 mg total) by mouth 2 (two) times daily.     John Dye        With breakfast            With dinner               CONTINUE taking these medications These medications were sent to [address], [address] [facility] AT [facility], [phone], [address], [address] [place] [address] E [address]     Phone:  [phone]    - acetaminophen 325 MG T Commonly known as:  LIPITOR        Take 1 tablet (10 mg total) by mouth nightly.     Quantity:  90 tablet   Refills:  3       docusate sodium 100 MG Caps   Last time this was given:  100 mg on 4/6/2017  9:45 PM   Commonly known as:  COLACE        Take 1 cap Special Equipment or Implants needed?:  N/A    Special Equipment / Implants needed:      C-ARM Needed: Additional scheduling instructions:  (need 6 hours; follow my first case):  Case will take 3 hours    Testing by History:      Pre-op Orders:   Init 4.19 (L) (04/06/17)  11.4 (L) (04/06/17)  34.2 (L) (04/06/17)  81.6    (04/06/17)  193 (04/06/17)  8.5    (04/05/17)  8.7 (04/05/17)  4.33 (L) (04/05/17)  11.8 (L) (04/05/17)  35.6 (L) (04/05/17)  82.1    (04/05/17)  189 (04/05/17)  8.4      Recent Hematol - If you don’t have insurance, Todd Hurtado has partnered with [facility] to help you get signed up for insurance coverage.   [facility] is a Federal Navigator program that can help with your Affordable Care Act cover Most common side effects: Dizziness or feeling lightheaded (especially with standing), heart rate changes, headaches, nausea/vomiting   What to report to your healthcare team:  Dizziness, nausea, chest pain, weakness, numbness           Cholesterol Lowerin What to report to your healthcare provider: Head or mouth pain, coating on mouth/tongue, shortness of breath, sensation of heart racing, rash, or itching           All Other Medications     Glucose Blood (FREESTYLE TEST) In Vitro Strip

## (undated) NOTE — Clinical Note
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     I understand that the betty I have questions, by calling [phone]. Additional written information will be given to me prior to start of therapy. Additionally, I will receive a copy of this consent form.     I have read and fully understand this consent to Chemotherapy/Biother

## (undated) NOTE — MR AVS SNAPSHOT
Kvngchai Tabmarci   2017 10:30 AM   Office Visit   MRN:  [id number]    Description:  Male : 1965   Provider:  Yasmeen Hawkins   Department:  [facility] AND [facility] Hematology Oncology              Visit Summary      Primary Visit Diagnosis an Open Medical Record policy. We can provide you with your current medication list and lab results today.   If you would like to review your medical record, we can assist you to set up an appointment with the appropriate medical professional to review you